# Patient Record
Sex: FEMALE | Race: WHITE | Employment: FULL TIME | URBAN - METROPOLITAN AREA
[De-identification: names, ages, dates, MRNs, and addresses within clinical notes are randomized per-mention and may not be internally consistent; named-entity substitution may affect disease eponyms.]

---

## 2017-01-06 DIAGNOSIS — F51.01 PRIMARY INSOMNIA: Primary | ICD-10-CM

## 2017-01-06 DIAGNOSIS — L70.8 OTHER ACNE: ICD-10-CM

## 2017-01-06 DIAGNOSIS — F32.1 MAJOR DEPRESSIVE DISORDER, SINGLE EPISODE, MODERATE (H): ICD-10-CM

## 2017-01-06 DIAGNOSIS — K27.9 PEPTIC ULCER: ICD-10-CM

## 2017-01-06 RX ORDER — TRAZODONE HYDROCHLORIDE 100 MG/1
100-200 TABLET ORAL AT BEDTIME
Qty: 60 TABLET | Refills: 0 | Status: SHIPPED | OUTPATIENT
Start: 2017-01-06 | End: 2017-02-06

## 2017-01-06 RX ORDER — PAROXETINE 40 MG/1
40 TABLET, FILM COATED ORAL DAILY
Qty: 30 TABLET | Refills: 0 | Status: SHIPPED | OUTPATIENT
Start: 2017-01-06 | End: 2017-02-21

## 2017-01-06 NOTE — TELEPHONE ENCOUNTER
Trazodone Hcl 100 mg tablet       Last Written Prescription Date: 12/06/16  Last Fill Quantity: 60; # refills: 0  Last Office Visit with [facility] [place] [place], [facility] or [facility] prescribing provider:  05/09/16        Last PHQ-9 score on record=   PHQ-9 SCORE 5/9/2016   Total Score -   Total Score 3       AST       13   5/9/2016  ALT       22   5/9/2016    Paroxetine Hcl 40 mg tablet  Last Written Prescription Date: 12/06/16  Last Fill Quantity: 30, # refills: 0  Last Office Visit with [facility] [place] [place] primary care provider:  05/09/16        Last PHQ-9 score on record=   PHQ-9 SCORE 5/9/2016   Total Score -   Total Score 3       Funmi Ghosh CPhT  On Behalf of [facility]

## 2017-01-31 ENCOUNTER — TELEPHONE (OUTPATIENT)
Dept: FAMILY MEDICINE | Facility: CLINIC | Age: 52
End: 2017-01-31

## 2017-01-31 DIAGNOSIS — I10 HYPERTENSION, BENIGN ESSENTIAL, GOAL BELOW 140/90: ICD-10-CM

## 2017-01-31 DIAGNOSIS — E78.5 HYPERLIPIDEMIA LDL GOAL <130: ICD-10-CM

## 2017-01-31 DIAGNOSIS — F33.1 MAJOR DEPRESSIVE DISORDER, RECURRENT EPISODE, MODERATE (H): Primary | ICD-10-CM

## 2017-01-31 NOTE — Clinical Note
[facility]  [address] [address] [phone]      January 31, 2017      Charlene Morales  [address]  [place] [address]        Dear Charlene,     As part of [facility] commitment to health and wellness we have recently reviewed your chart and your medical record indicates that you are due for one or more of the following:    -- Blood pressure check. We changed your Lisinopril dose in September 2016. We want to be sure your medication is working adequately for you. Please call to schedule a nurse visit to have your blood pressure checked or if it is more convenient for you, the pharmacy takes walk in blood pressure checks. Either of these choices are at no charge to you. We will need an updated blood pressure reading to continue to fill your medications.    -- Questionnaire for depression. Please fill out the enclosed questionnaire and send it back to us in the stamped envelope provided. These questions are about your depression and how you have been feeling in the last 2 weeks. We need this form updated in your chart in order to continue to fill your medication. The score of this questionnaire helps to determine if your medications are working well. Thank you in advance for filling it out. Also, enclosed is a Depression Action Plan. Please read through this and keep it for future reference.     -- Lab tests: You are due for the following: Lipid (cholesterol) and Microalbumin (urine test to check for protein). Please schedule a lab appointment. You will need to be fasting for this appointment, nothing to eat or drink except water and your medications.     -- Mammogram. Please call one of the following numbers to schedule:  [facility] [phone]  [facility] [phone]  [facility] [phone]  [facility] [phone]   of [facility] [phone]  [facility] [phone]    -- Colonoscopy or FIT test. You will be due for  this after your 52th birthday. Please call one of the following numbers to schedule a colonoscopy:  [facility] [phone]  [facility] [phone]  [facility] [phone]  [facility] [phone] (multiple sites, call for locations)    A colonoscopy is the gold standard but if you are reluctant to do this there is a less invasive and less expensive alternative. FIT (fecal immunochemical testing) is a screening option that is performed on a yearly basis. This is a test to check for blood in the stool, which can be performed in the comfort of your own home. If you are willing to perform this test, we can order the kit for you to  at our lab. When you have completed the test, you simply mail it in the pre-addressed envelope.    Please call us at [phone] if you need an order for a colonoscopy or FIT testing.    Please try to schedule and/or complete the tests above within the next 2-4 weeks.   The number to call to schedule an appointment at [facility] is [phone].    While we work hard to maintain accurate records on all our patients, it is always possible that this notice does not accurately reflect tests that you may have had. If we have made this error in that case please accept our apologies. To ensure that we do not continue to send you notices please verify, at your next visit or by a [email or web address] message, that we have accurate dates of your tests, even if these were done many years ago.     Working together for your health is our goal. If you have any questions or concerns regarding this letter, we'd like to hear from you.    Thank you for choosing [facility] for your healthcare needs.      Sincerely,     GEOVANY Oh / trish

## 2017-01-31 NOTE — TELEPHONE ENCOUNTER
Panel Management Review  I sent a NatSent message to her with HM that was due but she has not read it. Today I will send her a letter instead.    Patient has the following on her problem list:     Depression / Dysthymia review  PHQ-9 SCORE 11/18/2014 9/30/2015 5/9/2016   Total Score 2 - -   Total Score - 2 3      Patient is due for:  PHQ9 and DAP      IVD   ASA: Failed    Last LDL:    CHOL      179   3/5/2014  HDL       56   3/5/2014  LDL      104   3/5/2014  LDL      174   3/22/2013  TRIG       94   3/5/2014   CHOLHDLRATIO      3.0   3/5/2014     Is the patient on a Statin? YES   Is the patient on Aspirin? NO                  Medications     HMG CoA Reductase Inhibitors    atorvastatin (LIPITOR) 10 MG tablet          Last three blood pressure readings:  BP Readings from Last 3 Encounters:   09/13/16 146/96   05/09/16 140/96   09/30/15 126/74        Tobacco History:     History   Smoking status     Never Smoker    Smokeless tobacco     Never Used         Hypertension   Last three blood pressure readings:  BP Readings from Last 3 Encounters:   09/13/16 146/96   05/09/16 140/96   09/30/15 126/74     Blood pressure: Failed    HTN Guidelines:  Age 18-59 BP range:  Less than 140/90  Age 60-85 with Diabetes:  Less than 140/90  Age 60-85 without Diabetes:  less than 150/90      Composite cancer screening  Chart review shows that this patient is due/due soon for the following Mammogram  Summary:    Patient is due/failing the following:   Blood pressure, mammo, microalbumin, PHQ9, DAP, lipid, colonoscopy/FIT (due after her 52th birthday on 2/10/2017)    Action needed:   Referral for mammo and colon/FIT, lab appt for microalbumin and lipid (future orders placed), patient needs to fill out PHQ9, print and mail DAP, nurse only for BP check    Type of outreach:    Sent letter.    Questions for provider review:    None                                                                                                                                     Birdie Cohen A     Chart routed to none.

## 2017-01-31 NOTE — Clinical Note
My Depression Action Plan  Name: Charlene Morales   Date of Birth 1965  Date: 1/31/2017    My doctor: Tania Vázquez   My clinic: [address] [address] [phone]          GREEN    ZONE   Good Control    What it looks like:     Things are going generally well. You have normal up s and down s. You may even feel depressed from time to time, but bad moods usually last less than a day.   What you need to do:  1. Continue to care for yourself (see self care plan)  2. Check your depression survival kit and update it as needed  3. Follow your physician s recommendations including any medication.  4. Do not stop taking medication unless you consult with your physician first.           YELLOW         ZONE Getting Worse    What it looks like:     Depression is starting to interfere with your life.     It may be hard to get out of bed; you may be starting to isolate yourself from others.    Symptoms of depression are starting to last most all day and this has happened for several days.     You may have suicidal thoughts but they are not constant.   What you need to do:     1. Call your care team, your response to treatment will improve if you keep your care team informed of your progress. Yellow periods are signs an adjustment may need to be made.     2. Continue your self-care, even if you have to fake it!    3. Talk to someone in your support network    4. Open up your depression survival kit           RED    ZONE Medical Alert - Get Help    What it looks like:     Depression is seriously interfering with your life.     You may experience these or other symptoms: You can t get out of bed most days, can t work or engage in other necessary activities, you have trouble taking care of basic hygiene, or basic responsibilities, thoughts of suicide or death that will not go away, self-injurious behavior.     What you need to do:  1. Call your care  team and request a same-day appointment. If they are not available (weekends or after hours) call your local crisis line, emergency room or 911.          Depression Self Care Plan / Survival Kit    Self-Care for Depression  Here s the deal. Your body and mind are really not as separate as most people think.  What you do and think affects how you feel and how you feel influences what you do and think. This means if you do things that people who feel good do, it will help you feel better.  Sometimes this is all it takes.  There is also a place for medication and therapy depending on how severe your depression is, so be sure to consult with your medical provider and/ or Behavioral Health Consultant if your symptoms are worsening or not improving.     In order to better manage my stress, I will:    Exercise  Get some form of exercise, every day. This will help reduce pain and release endorphins, the  feel good  chemicals in your brain. This is almost as good as taking antidepressants!  This is not the same as joining a gym and then never going! (they count on that by the way ) It can be as simple as just going for a walk or doing some gardening, anything that will get you moving.      Hygiene   Maintain good hygiene (Get out of bed in the morning, Make your bed, Brush your teeth, Take a shower, and Get dressed like you were going to work, even if you are unemployed).  If your clothes don't fit try to get ones that do.    Diet  I will strive to eat foods that are good for me, drink plenty of water, and avoid excessive sugar, caffeine, alcohol, and other mood-altering substances.  Some foods that are helpful in depression are: complex carbohydrates, B vitamins, flaxseed, fish or fish oil, fresh fruits and vegetables.    Psychotherapy  I agree to participate in Individual Therapy (if recommended).    Medication  If prescribed medications, I agree to take them.  Missing doses can result in serious side effects.  I  understand that drinking alcohol, or other illicit drug use, may cause potential side effects.  I will not stop my medication abruptly without first discussing it with my provider.    Staying Connected With Others  I will stay in touch with my friends, family members, and my primary care provider/team.    Use your imagination  Be creative.  We all have a creative side; it doesn t matter if it s oil painting, sand castles, or mud pies! This will also kick up the endorphins.    Witness Beauty  (AKA stop and smell the roses) Take a look outside, even in mid-winter. Notice colors, textures. Watch the squirrels and birds.     Service to others  Be of service to others.  There is always someone else in need.  By helping others we can  get out of ourselves  and remember the really important things.  This also provides opportunities for practicing all the other parts of the program.    Humor  Laugh and be silly!  Adjust your TV habits for less news and crime-drama and more comedy.    Control your stress  Try breathing deep, massage therapy, biofeedback, and meditation. Find time to relax each day.     My support system    Clinic Contact:  Phone number:    Contact 1:  Phone number:    Contact 2:  Phone number:    [employer]/:  Phone number:    Therapist:  Phone number:    Local crisis center:    Phone number:    Other community support:  Phone number:

## 2017-02-06 DIAGNOSIS — F51.01 PRIMARY INSOMNIA: ICD-10-CM

## 2017-02-06 DIAGNOSIS — E78.5 HYPERLIPIDEMIA LDL GOAL <130: Primary | ICD-10-CM

## 2017-02-07 NOTE — TELEPHONE ENCOUNTER
Trazodone 100mg      Last Written Prescription Date: 1/6/17  Last Fill Quantity: 60,  # refills: 0   Last Office Visit with [facility], [facility] or [facility] prescribing provider: 5/9/16                                         Next 5 appointments (look out 90 days)     Feb 20, 2017  8:45 AM   Nurse [facility] with [facility] C[facility]Lpn   [facility]    [address] [address]   [phone]                      Lipitor 10mg     Last Written Prescription Date: 9/30/15  Last Fill Quantity: 9, # refills: 1  Last Office Visit with [facility], [facility] or [facility] prescribing provider: 5/9/16   Next 5 appointments (look out 90 days)     Feb 20, 2017  8:45 AM   Nurse Only with Fl Ll Cma/Lpn   [facility]    [address] [address]   [phone]                   CHOL      179   3/5/2014  HDL       56   3/5/2014  LDL      104   3/5/2014  LDL      174   3/22/2013  TRIG       94   3/5/2014  CHOLHDLRATIO      3.0   3/5/2014        Thanks,  Nancy Connelly, Technician (float)  [facility]

## 2017-02-09 RX ORDER — ATORVASTATIN CALCIUM 10 MG/1
10 TABLET, FILM COATED ORAL DAILY
Qty: 30 TABLET | Refills: 0 | Status: SHIPPED | OUTPATIENT
Start: 2017-02-09 | End: 2017-03-10

## 2017-02-09 RX ORDER — TRAZODONE HYDROCHLORIDE 100 MG/1
100-200 TABLET ORAL AT BEDTIME
Qty: 60 TABLET | Refills: 0 | Status: SHIPPED | OUTPATIENT
Start: 2017-02-09 | End: 2017-03-10

## 2017-02-09 NOTE — TELEPHONE ENCOUNTER
Medication is being filled for 1 time refill only due to:   Over due for office visit and/or labs   LAUREN Holliday  RN/Marc Briscoe

## 2017-02-21 DIAGNOSIS — K27.9 PEPTIC ULCER: ICD-10-CM

## 2017-02-21 DIAGNOSIS — F32.1 MAJOR DEPRESSIVE DISORDER, SINGLE EPISODE, MODERATE (H): ICD-10-CM

## 2017-02-21 RX ORDER — PAROXETINE 40 MG/1
40 TABLET, FILM COATED ORAL DAILY
Qty: 30 TABLET | Refills: 0 | Status: SHIPPED | OUTPATIENT
Start: 2017-02-21 | End: 2017-03-10

## 2017-02-21 NOTE — TELEPHONE ENCOUNTER
Paxil    Last Written Prescription Date: 1-6-17  Last Fill Quantity: 30 # refills: 0  Last Office Visit with [facility] primary care provider:  5-9-16   Next 5 appointments (look out 90 days)     Mar 13, 2017  9:00 AM CDT   [facility] C[facility]    [address] [address]   [phone]                   Last PHQ-9 score on record=   PHQ-9 SCORE 5/9/2016   Total Score -   Total Score 3       Omeprazole     Last Written Prescription Date: 8-22-16  Last Fill Quantity: 180  # refills: 1  Last Office Visit with [facility] prescribing provider: 05/09/ 2016                                   Next 5 appointments (look out 90 days)     Mar 13, 2017  9:00 AM CDT   SHORT [facility] NADINE Cooper CNP   [facility]    [address] [address] [phone]

## 2017-03-10 ENCOUNTER — OFFICE VISIT (OUTPATIENT)
Dept: FAMILY MEDICINE | Facility: CLINIC | Age: 52
End: 2017-03-10
Payer: COMMERCIAL

## 2017-03-10 VITALS
TEMPERATURE: 97.9 F | WEIGHT: 183.4 LBS | HEIGHT: 66 IN | DIASTOLIC BLOOD PRESSURE: 102 MMHG | BODY MASS INDEX: 29.47 KG/M2 | SYSTOLIC BLOOD PRESSURE: 152 MMHG | HEART RATE: 72 BPM

## 2017-03-10 DIAGNOSIS — F32.1 MAJOR DEPRESSIVE DISORDER, SINGLE EPISODE, MODERATE (H): Primary | ICD-10-CM

## 2017-03-10 DIAGNOSIS — E78.5 HYPERLIPIDEMIA LDL GOAL <130: ICD-10-CM

## 2017-03-10 DIAGNOSIS — Z12.31 VISIT FOR SCREENING MAMMOGRAM: ICD-10-CM

## 2017-03-10 DIAGNOSIS — Z12.11 SPECIAL SCREENING FOR MALIGNANT NEOPLASMS, COLON: ICD-10-CM

## 2017-03-10 DIAGNOSIS — F51.01 PRIMARY INSOMNIA: ICD-10-CM

## 2017-03-10 DIAGNOSIS — I10 HYPERTENSION, BENIGN ESSENTIAL, GOAL BELOW 140/90: ICD-10-CM

## 2017-03-10 LAB
ANION GAP SERPL CALCULATED.3IONS-SCNC: 9 MMOL/L (ref 3–14)
BUN SERPL-MCNC: 27 MG/DL (ref 7–30)
CALCIUM SERPL-MCNC: 9 MG/DL (ref 8.5–10.1)
CHLORIDE SERPL-SCNC: 110 MMOL/L (ref 94–109)
CHOLEST SERPL-MCNC: 265 MG/DL
CO2 SERPL-SCNC: 27 MMOL/L (ref 20–32)
CREAT SERPL-MCNC: 0.92 MG/DL (ref 0.52–1.04)
CREAT UR-MCNC: 101 MG/DL
GFR SERPL CREATININE-BSD FRML MDRD: 65 ML/MIN/1.7M2
GLUCOSE SERPL-MCNC: 93 MG/DL (ref 70–99)
HDLC SERPL-MCNC: 72 MG/DL
LDLC SERPL CALC-MCNC: 171 MG/DL
MICROALBUMIN UR-MCNC: 11 MG/L
MICROALBUMIN/CREAT UR: 10.99 MG/G CR (ref 0–25)
NONHDLC SERPL-MCNC: 193 MG/DL
POTASSIUM SERPL-SCNC: 3.8 MMOL/L (ref 3.4–5.3)
SODIUM SERPL-SCNC: 146 MMOL/L (ref 133–144)
TRIGL SERPL-MCNC: 111 MG/DL

## 2017-03-10 PROCEDURE — 99213 OFFICE O/P EST LOW 20 MIN: CPT | Performed by: NURSE PRACTITIONER

## 2017-03-10 PROCEDURE — 36415 COLL VENOUS BLD VENIPUNCTURE: CPT | Performed by: NURSE PRACTITIONER

## 2017-03-10 PROCEDURE — 80048 BASIC METABOLIC PNL TOTAL CA: CPT | Performed by: NURSE PRACTITIONER

## 2017-03-10 PROCEDURE — 82043 UR ALBUMIN QUANTITATIVE: CPT | Performed by: NURSE PRACTITIONER

## 2017-03-10 PROCEDURE — 80061 LIPID PANEL: CPT | Performed by: NURSE PRACTITIONER

## 2017-03-10 RX ORDER — LISINOPRIL 40 MG/1
40 TABLET ORAL DAILY
Qty: 90 TABLET | Refills: 1 | Status: CANCELLED | OUTPATIENT
Start: 2017-03-10

## 2017-03-10 RX ORDER — TRAZODONE HYDROCHLORIDE 100 MG/1
100-200 TABLET ORAL AT BEDTIME
Qty: 60 TABLET | Refills: 11 | Status: SHIPPED | OUTPATIENT
Start: 2017-03-10 | End: 2018-03-23

## 2017-03-10 RX ORDER — LISINOPRIL 40 MG/1
40 TABLET ORAL DAILY
Qty: 90 TABLET | Refills: 3 | Status: SHIPPED | OUTPATIENT
Start: 2017-03-10 | End: 2018-04-10

## 2017-03-10 RX ORDER — CHLORTHALIDONE 25 MG/1
12.5 TABLET ORAL DAILY
Qty: 45 TABLET | Refills: 3 | Status: SHIPPED | OUTPATIENT
Start: 2017-03-10 | End: 2018-01-02

## 2017-03-10 RX ORDER — PAROXETINE 40 MG/1
40 TABLET, FILM COATED ORAL DAILY
Qty: 90 TABLET | Refills: 1 | Status: SHIPPED | OUTPATIENT
Start: 2017-03-10 | End: 2017-08-30

## 2017-03-10 RX ORDER — ATORVASTATIN CALCIUM 10 MG/1
10 TABLET, FILM COATED ORAL DAILY
Qty: 90 TABLET | Refills: 3 | Status: SHIPPED | OUTPATIENT
Start: 2017-03-10 | End: 2017-03-14 | Stop reason: DRUGHIGH

## 2017-03-10 RX ORDER — LISINOPRIL AND HYDROCHLOROTHIAZIDE 12.5; 2 MG/1; MG/1
2 TABLET ORAL DAILY
Qty: 180 TABLET | Refills: 1 | Status: SHIPPED | OUTPATIENT
Start: 2017-03-10 | End: 2017-03-10

## 2017-03-10 NOTE — MR AVS SNAPSHOT
After Visit Summary   3/10/2017    Charlene Morales    MRN: [id number]           Patient Information     Date Of Birth          1965        Visit Information        Provider Department      3/10/2017 8:00 AM Tania Vázquez APRN CNP [facility]        Today's Diagnoses     Major depressive disorder, single episode, moderate (H)    -  1    Hyperlipidemia LDL goal <130        Primary insomnia        Hypertension, benign essential, goal below 140/90        Special screening for malignant neoplasms, colon        Visit for screening mammogram          Care Instructions    You are doing great with the exercise     Stay well hydrated - urine should be clear.        Will add in another blood pressure pill.   Recheck your blood pressure in  2-3 weeks.  You can make a nurse only visit or go to the pharmacy      Follow DASH diet,             Follow-ups after your visit        Additional Services     GASTROENTEROLOGY ADULT REF CONSULT ONLY       Preferred Location: [facility] [phone]      Please be aware that coverage of these services is subject to the terms and limitations of your health insurance plan.  Call member services at your health plan with any benefit or coverage questions.  Any procedures must be performed at a [facility] facility OR coordinated by your clinic's referral office.    Please bring the following with you to your appointment:    (1) Any X-Rays, CTs or MRIs which have been performed.  Contact the facility where they were done to arrange for  prior to your scheduled appointment.    (2) List of current medications   (3) This referral request   (4) Any documents/labs given to you for this referral                  Future tests that were ordered for you today     Open Future Orders        Priority Expected Expires Ordered    *MA Screening Digital Bilateral Routine  3/10/2018 3/10/2017            Who to contact     Normal or non-critical lab and  "imaging results will be communicated to you by MyChart, letter or phone within 4 business days after the clinic has received the results. If you do not hear from us within 7 days, please contact the clinic through [employer] or phone. If you have a critical or abnormal lab result, we will notify you by phone as soon as possible.  Submit refill requests through Now[employer] or call your pharmacy and they will forward the refill request to us. Please allow 3 business days for your refill to be completed.          If you need to speak with a  for additional information , please call: [phone]           Additional Information About Your Visit        NowThis [employer] Information     [employer] gives you secure access to your electronic health record. If you see a primary care provider, you can also send messages to your care team and make appointments. If you have questions, please call your primary care clinic.  If you do not have a primary care provider, please call [phone] and they will assist you.        Care EveryWhere ID     This is your Care EveryWhere ID. This could be used by other organizations to access your [facility] medical records  [id number]        Your Vitals Were     Pulse Temperature Height BMI (Body Mass Index)          72 97.9  F (36.6  C) (Tympanic) 5' 5.5\" (1.664 m) 30.06 kg/m2         Blood Pressure from Last 3 Encounters:   03/10/17 (!) 152/102   09/13/16 (!) 146/96   05/09/16 (!) 140/96    Weight from Last 3 Encounters:   03/10/17 183 lb 6.4 oz (83.2 kg)   05/09/16 189 lb 12.8 oz (86.1 kg)   09/30/15 188 lb 3.2 oz (85.4 kg)              We Performed the Following     Albumin Random Urine Quantitative     Basic metabolic panel  (Ca, Cl, CO2, Creat, Gluc, K, Na, BUN)     GASTROENTEROLOGY ADULT REF CONSULT ONLY     Lipid Profile with reflex to direct LDL          Today's Medication Changes          These changes are accurate as of: 3/10/17  8:39 AM.  If you have any questions, ask your nurse " or doctor.               These medicines have changed or have updated prescriptions.        Dose/Directions    * lisinopril 40 MG tablet   Commonly known as:  PRINIVIL/ZESTRIL   This may have changed:  Another medication with the same name was changed. Make sure you understand how and when to take each.   Used for:  Hypertension, benign essential, goal below 140/90   Changed by:  Tania Vázquez APRN CNP        Dose:  40 mg   Take 1 tablet (40 mg) by mouth daily   Quantity:  90 tablet   Refills:  1       * lisinopril 40 MG tablet   Commonly known as:  PRINIVIL/ZESTRIL   This may have changed:    - medication strength  - how much to take  - additional instructions   Used for:  Hypertension, benign essential, goal below 140/90   Changed by:  Tania Vázquez APRN CNP        Dose:  40 mg   Take 1 tablet (40 mg) by mouth daily   Quantity:  90 tablet   Refills:  3       PARoxetine 40 MG tablet   Commonly known as:  PAXIL   This may have changed:  additional instructions   Used for:  Major depressive disorder, single episode, moderate (H)   Changed by:  Tania Vázquez APRN CNP        Dose:  40 mg   Take 1 tablet (40 mg) by mouth daily   Quantity:  90 tablet   Refills:  1       traZODone 100 MG tablet   Commonly known as:  DESYREL   This may have changed:  additional instructions   Used for:  Primary insomnia   Changed by:  Tania Vázquez APRN CNP        Dose:  100-200 mg   Take 1-2 tablets (100-200 mg) by mouth At Bedtime   Quantity:  60 tablet   Refills:  11       * Notice:  This list has 2 medication(s) that are the same as other medications prescribed for you. Read the directions carefully, and ask your doctor or other care provider to review them with you.         Where to get your medicines      These medications were sent to [facility] [address]  [address], [place] [address]     Phone:  [phone]     atorvastatin 10 MG  tablet    lisinopril 40 MG tablet    PARoxetine 40 MG tablet    traZODone 100 MG tablet                Primary Care Provider Office Phone # Fax #    NADINE Cooper [facility] [phone]       [facility] [address] [place] [address]        Thank you!     Thank you for choosing [facility]  for your care. Our goal is always to provide you with excellent care. Hearing back from our patients is one way we can continue to improve our services. Please take a few minutes to complete the written survey that you may receive in the mail after your visit with us. Thank you!             Your Updated Medication List - Protect others around you: Learn how to safely use, store and throw away your medicines at www.disposemymeds.org.          This list is accurate as of: 3/10/17  8:39 AM.  Always use your most recent med list.                   Brand Name Dispense Instructions for use    ACETAMINOPHEN      Reported on 3/10/2017       atorvastatin 10 MG tablet    LIPITOR    90 tablet    Take 1 tablet (10 mg) by mouth daily Last fill need office appt and fasting labs done for any further refills       ibuprofen 200 MG tablet    ADVIL/MOTRIN    120    5 orally at onset of headache       * lisinopril 40 MG tablet    PRINIVIL/ZESTRIL    90 tablet    Take 1 tablet (40 mg) by mouth daily       * lisinopril 40 MG tablet    PRINIVIL/ZESTRIL    90 tablet    Take 1 tablet (40 mg) by mouth daily       minocycline 100 MG tablet    DYNACIN    180 tablet    Take one to two capsules po qd for acne       omeprazole 20 MG CR capsule    priLOSEC    180 capsule    Take 2 capsules (40 mg) by mouth daily       PARoxetine 40 MG tablet    PAXIL    90 tablet    Take 1 tablet (40 mg) by mouth daily       sucralfate 1 GM tablet    CARAFATE    120 tablet    Take 1 tablet (1 g) by mouth 4 times daily Last refill until seen and labs       traZODone 100 MG tablet    DESYREL    60 tablet    Take 1-2  tablets (100-200 mg) by mouth At Bedtime       valACYclovir 1000 mg tablet    VALTREX    21 tablet    Take 1 tablet (1,000 mg) by mouth 3 times daily for 7 days       * Notice:  This list has 2 medication(s) that are the same as other medications prescribed for you. Read the directions carefully, and ask your doctor or other care provider to review them with you.

## 2017-03-10 NOTE — PROGRESS NOTES
SUBJECTIVE:                                                    Charlene Morales is a 52 year old female who presents to clinic today for the following health issues:      Hyperlipidemia Follow-Up      Rate your low fat/cholesterol diet?: good    Taking statin?  Yes, no muscle aches from statin    Other lipid medications/supplements?:  none     Hypertension Follow-up      Outpatient blood pressures are not being checked.    Low Salt Diet: not monitoring salt     Depression Followup    Status since last visit: Stable     See PHQ-9 for current symptoms.  Other associated symptoms: None    Complicating factors:   Significant life event:  No   Current substance abuse:  None  Anxiety or Panic symptoms:  No    PHQ-9  English PHQ-9   Any Language        Amount of exercise or physical activity: 4-5 days/week for an average of 15-30 minutes    Problems taking medications regularly: No    Medication side effects: none  Diet: regular (no restrictions)    Has lost 10 #  Is exercising  5 days per week  30 min at at time   Diet is good.     Problem list and histories reviewed & adjusted, as indicated.  Additional history: as documented    Patient Active Problem List   Diagnosis     Hypertension, benign essential, goal below 140/90     Peptic ulcer     Headache     BARBITUATE  DEPENDENCE      IRRITABLE BLADDER     CYSTIC ACNE     Weight gain     Health Care Home     [facility]     Hyperlipidemia LDL goal <130     History of  addiction  in treatment 7 year ago      Major depressive disorder, recurrent episode, moderate (H)     Primary insomnia     Past Surgical History   Procedure Laterality Date     Surgical history of -   8/23/2004     Hemorrhoidectomy & Partial Lateral Sphincterotomy     Surgical history of -   5/2/2003     Hemorrhoid Banding x2     Hc colonoscopy thru stoma, diagnostic  5/2/03     Normal. Repeat at age 52     Hysterectomy, pap no longer indicated  2004     Still has ovaries and        Social History   Substance Use  Topics     Smoking status: Never Smoker     Smokeless tobacco: Never Used     Alcohol use Yes      Comment: 1-2 drinks a week at most- wine      Family History   Problem Relation Age of Onset     Arthritis Mother      Hypertension Mother      HEART DISEASE Father      DIABETES Father      Lipids Father      Alcohol/Drug Father      C.A.D. Father      open heart surgery at age 50     Hypertension Maternal Grandfather      Alzheimer Disease Maternal Grandfather      Colon Cancer Maternal Grandfather      age 65     HEART DISEASE Paternal Grandfather      Thyroid Disease Sister      Hypertension Sister      Hypertension Sister      CEREBROVASCULAR DISEASE No family hx of      Breast Cancer No family hx of          Current Outpatient Prescriptions   Medication Sig Dispense Refill     PARoxetine (PAXIL) 40 MG tablet Take 1 tablet (40 mg) by mouth daily Last refill until seen 30 tablet 0     omeprazole (PRILOSEC) 20 MG CR capsule Take 2 capsules (40 mg) by mouth daily 180 capsule 1     atorvastatin (LIPITOR) 10 MG tablet Take 1 tablet (10 mg) by mouth daily Last fill need office appt and fasting labs done for any further refills 30 tablet 0     traZODone (DESYREL) 100 MG tablet Take 1-2 tablets (100-200 mg) by mouth At Bedtime Last fill  Needs office appt for any further refills March 2017 60 tablet 0     lisinopril (PRINIVIL,ZESTRIL) 40 MG tablet Take 1 tablet (40 mg) by mouth daily 90 tablet 1     minocycline (DYNACIN) 100 MG tablet Take one to two capsules po qd for acne 180 tablet 1     sucralfate (CARAFATE) 1 GM tablet Take 1 tablet (1 g) by mouth 4 times daily Last refill until seen and labs (Patient not taking: Reported on 3/10/2017) 120 tablet o     [DISCONTINUED] lisinopril (PRINIVIL,ZESTRIL) 20 MG tablet Take 1 tablet (20 mg) by mouth daily You are due for blood pressure check 30 tablet 0     valACYclovir (VALTREX) 1000 mg tablet Take 1 tablet (1,000 mg) by mouth 3 times daily for 7 days 21 tablet 0      "ACETAMINOPHEN Reported on 3/10/2017       IBUPROFEN 200 MG OR TABS 5 orally at onset of headache (Patient not taking: No sig reported) 120 prn     Allergies   Allergen Reactions     Sulfa Drugs      BP Readings from Last 3 Encounters:   03/10/17 (!) 152/102   09/13/16 (!) 146/96   05/09/16 (!) 140/96    Wt Readings from Last 3 Encounters:   03/10/17 183 lb 6.4 oz (83.2 kg)   05/09/16 189 lb 12.8 oz (86.1 kg)   09/30/15 188 lb 3.2 oz (85.4 kg)                    Reviewed and updated as needed this visit by clinical staff       Reviewed and updated as needed this visit by Provider         ROS:  C: NEGATIVE for fever, chills, change in weight  E/M: NEGATIVE for ear, mouth and throat problems  R: NEGATIVE for significant cough or SOB  CV: NEGATIVE for chest pain, palpitations or peripheral edema, no chest pain. Is tolerating exercise  Well.   GI: NEGATIVE for nausea, abdominal pain, heartburn, or change in bowel habits  PSYCHIATRIC: NEGATIVE for changes in mood or affect and depression is well controlled     OBJECTIVE:                                                    BP (!) 152/102 (BP Location: Left arm, Patient Position: Chair, Cuff Size: Adult Regular)  Pulse 72  Temp 97.9  F (36.6  C) (Tympanic)  Ht 5' 5.5\" (1.664 m)  Wt 183 lb 6.4 oz (83.2 kg)  BMI 30.06 kg/m2  Body mass index is 30.06 kg/(m^2).  GENERAL: healthy, alert and no distress  HENT: ear canals and TM's normal, nose and mouth without ulcers or lesions  NECK: no adenopathy, no asymmetry, masses, or scars and thyroid normal to palpation  RESP: lungs clear to auscultation - no rales, rhonchi or wheezes  CV: regular rate and rhythm, normal S1 S2, no S3 or S4, no murmur, click or rub, no peripheral edema and peripheral pulses strong  ABDOMEN: soft, nontender, no hepatosplenomegaly, no masses and bowel sounds normal  MS: no gross musculoskeletal defects noted, no edema  PSYCH: mentation appears normal, affect normal/bright    Diagnostic Test Results:  Labs are " "pending      ASSESSMENT/PLAN:                                                      ASSESSMENT/PLAN:      ICD-10-CM    1. Major depressive disorder, single episode, moderate (H) F32.1 PARoxetine (PAXIL) 40 MG tablet   2. Hyperlipidemia LDL goal <130 E78.5 atorvastatin (LIPITOR) 10 MG tablet     Lipid Profile with reflex to direct LDL   3. Primary insomnia F51.01 traZODone (DESYREL) 100 MG tablet   4. Hypertension, benign essential, goal below 140/90 I10 Albumin Random Urine Quantitative     Basic metabolic panel  (Ca, Cl, CO2, Creat, Gluc, K, Na, BUN)     lisinopril (PRINIVIL/ZESTRIL) 40 MG tablet     chlorthalidone (HYGROTON) 25 MG tablet     DISCONTINUED: lisinopril-hydrochlorothiazide (PRINZIDE/ZESTORETIC) 20-12.5 MG per tablet   5. Special screening for malignant neoplasms, colon Z12.11 GASTROENTEROLOGY ADULT REF CONSULT ONLY   6. Visit for screening mammogram Z12.31 *MA Screening Digital Bilateral       Patient Instructions   You are doing great with the exercise     Stay well hydrated - urine should be clear.        Will add in another blood pressure pill.   Recheck your blood pressure in  2-3 weeks.  You can make a nurse only visit or go to the pharmacy      Follow DASH diet,                  BMI:   Estimated body mass index is 30.06 kg/(m^2) as calculated from the following:    Height as of this encounter: 5' 5.5\" (1.664 m).    Weight as of this encounter: 183 lb 6.4 oz (83.2 kg).   Weight management plan: Discussed healthy diet and exercise guidelines and patient will follow up in 6 months in clinic to re-evaluate.      MEDICATIONS:        - Start taking Chlorthalidone        - Continue other medications without change  FUTURE APPOINTMENTS:       - mammo  Colonoscopy   Work on weight loss  Regular exercise  See Patient Instructions    YASMANY ROMAN NP, APRN [facility]    "

## 2017-03-10 NOTE — NURSING NOTE
"Chief Complaint   Patient presents with     Lipids     Hypertension     Diabetes       Initial BP (!) 152/102 (BP Location: Left arm, Patient Position: Chair, Cuff Size: Adult Regular)  Pulse 72  Temp 97.9  F (36.6  C) (Tympanic)  Ht 5' 5.5\" (1.664 m)  Wt 183 lb 6.4 oz (83.2 kg)  BMI 30.06 kg/m2 Estimated body mass index is 30.06 kg/(m^2) as calculated from the following:    Height as of this encounter: 5' 5.5\" (1.664 m).    Weight as of this encounter: 183 lb 6.4 oz (83.2 kg).  Medication Reconciliation: complete     Meghan Mendoza CMA (AAMA)      "

## 2017-03-10 NOTE — PATIENT INSTRUCTIONS
You are doing great with the exercise     Stay well hydrated - urine should be clear.        Will add in another blood pressure pill.   Recheck your blood pressure in  2-3 weeks.  You can make a nurse only visit or go to the pharmacy      Follow DASH diet,

## 2017-03-11 ASSESSMENT — PATIENT HEALTH QUESTIONNAIRE - PHQ9: SUM OF ALL RESPONSES TO PHQ QUESTIONS 1-9: 1

## 2017-03-14 ENCOUNTER — TELEPHONE (OUTPATIENT)
Dept: FAMILY MEDICINE | Facility: CLINIC | Age: 52
End: 2017-03-14

## 2017-03-14 DIAGNOSIS — E78.5 HYPERLIPIDEMIA LDL GOAL <130: Primary | ICD-10-CM

## 2017-03-14 DIAGNOSIS — E87.0 SERUM SODIUM ELEVATED: ICD-10-CM

## 2017-03-14 RX ORDER — ATORVASTATIN CALCIUM 40 MG/1
40 TABLET, FILM COATED ORAL DAILY
Qty: 90 TABLET | Refills: 3 | Status: SHIPPED | OUTPATIENT
Start: 2017-03-14 | End: 2018-04-10

## 2017-03-14 NOTE — TELEPHONE ENCOUNTER
Pt called and wants her lab results please, please release them to [facility].     Kaila Lamar, [place]

## 2017-03-28 DIAGNOSIS — L70.8 OTHER ACNE: ICD-10-CM

## 2017-03-28 NOTE — TELEPHONE ENCOUNTER
Minocycline      Last Written Prescription Date: 03/25/2016  Last Fill Quantity: 180,  # refills: 1   Last Office Visit with G, P or [facility] prescribing provider: 03/10/2017                                             Nora Sun [facility]  Float Technician  Marc Briscoe

## 2017-03-28 NOTE — TELEPHONE ENCOUNTER
Routing refill request to provider for review/approval because:  A break in medication    Una Naylor, PharmD  [facility]  On behalf of [facility]

## 2017-03-30 RX ORDER — MINOCYCLINE HYDROCHLORIDE 100 MG/1
TABLET ORAL
Qty: 180 TABLET | Refills: 1 | Status: SHIPPED | OUTPATIENT
Start: 2017-03-30 | End: 2018-01-02

## 2017-05-12 ENCOUNTER — TELEPHONE (OUTPATIENT)
Dept: FAMILY MEDICINE | Facility: CLINIC | Age: 52
End: 2017-05-12

## 2017-05-12 NOTE — TELEPHONE ENCOUNTER
Panel Management Review      Patient has the following on her problem list:     Depression / Dysthymia review  PHQ-9 SCORE 9/30/2015 5/9/2016 3/10/2017   Total Score - - -   Total Score 2 3 1      Patient is due for:  None      IVD   ASA: FAILED    Last LDL:    Lab Results   Component Value Date    CHOL 265 03/10/2017     Lab Results   Component Value Date    HDL 72 03/10/2017     Lab Results   Component Value Date     03/10/2017     Lab Results   Component Value Date    TRIG 111 03/10/2017        Lab Results   Component Value Date    CHOLHDLRATIO 3.0 03/05/2014        Is the patient on a Statin? YES   Is the patient on Aspirin? NO                  Medications     HMG CoA Reductase Inhibitors    atorvastatin (LIPITOR) 40 MG tablet          Last three blood pressure readings:  BP Readings from Last 3 Encounters:   03/10/17 (!) 152/102   09/13/16 (!) 146/96   05/09/16 (!) 140/96        Tobacco History:     History   Smoking Status     Never Smoker   Smokeless Tobacco     Never Used       Hypertension   Last three blood pressure readings:  BP Readings from Last 3 Encounters:   03/10/17 (!) 152/102   09/13/16 (!) 146/96   05/09/16 (!) 140/96     Blood pressure: FAILED    HTN Guidelines:  Age 18-59 BP range:  Less than 140/90  Age 60-85 with Diabetes:  Less than 140/90  Age 60-85 without Diabetes:  less than 150/90      Composite cancer screening  Chart review shows that this patient is due/due soon for the following Mammogram and Colonoscopy  Summary:    Patient is due/failing the following:   BP CHECK, COLONOSCOPY and MAMMOGRAM    Action needed:   Patient needs office visit for BP check.    Type of outreach:    Sent [employer] message.    Questions for provider review:    None                                                                                                                                    Funmi Stanley       Chart routed to none .

## 2017-05-16 DIAGNOSIS — Z87.11 HISTORY OF GASTRIC ULCER: ICD-10-CM

## 2017-05-16 DIAGNOSIS — R10.11 ABDOMINAL PAIN, RIGHT UPPER QUADRANT: ICD-10-CM

## 2017-05-16 NOTE — TELEPHONE ENCOUNTER
sucralfate      Last Written Prescription Date: 11-14-16  Last Fill Quantity: 120,  # refills: 0   Last Office Visit with G, P or [facility] prescribing provider: 3-10-17

## 2017-05-17 RX ORDER — SUCRALFATE 1 G/1
1 TABLET ORAL 4 TIMES DAILY
Qty: 120 TABLET | Refills: 2 | Status: SHIPPED | OUTPATIENT
Start: 2017-05-17 | End: 2017-11-14

## 2017-05-17 NOTE — TELEPHONE ENCOUNTER
Prescription approved per [facility] Refill Protocol or patient Primary care provider (PCP)  LAUREN Holliday RN/Marc Briscoe

## 2017-06-24 ENCOUNTER — HEALTH MAINTENANCE LETTER (OUTPATIENT)
Age: 52
End: 2017-06-24

## 2017-08-30 DIAGNOSIS — F32.1 MAJOR DEPRESSIVE DISORDER, SINGLE EPISODE, MODERATE (H): ICD-10-CM

## 2017-08-30 NOTE — TELEPHONE ENCOUNTER
PARoxetine (PAXIL) 40 MG tablet     Last Written Prescription Date: 3/10/17  Last Fill Quantity: 90, # refills: 1  Last Office Visit with G primary care provider:  3/10/17        Last PHQ-9 score on record=   PHQ-9 SCORE 3/10/2017   Total Score -   Total Score 1

## 2017-08-31 RX ORDER — PAROXETINE 40 MG/1
TABLET, FILM COATED ORAL
Qty: 90 TABLET | Refills: 2 | Status: SHIPPED | OUTPATIENT
Start: 2017-08-31 | End: 2018-04-10

## 2017-09-12 DIAGNOSIS — K27.9 PEPTIC ULCER: ICD-10-CM

## 2017-09-12 NOTE — TELEPHONE ENCOUNTER
Omeprazole Caps 20mg      Last Written Prescription Date: 02/21/2017 #180 x1  Last filled 06/06/2017  Last Office Visit with [facility], [facility] or [facility] prescribing provider: 03/10/2017 AISHA Vázquez

## 2017-09-13 NOTE — TELEPHONE ENCOUNTER
Prescription approved per [facility] Refill Protocol or patient Primary care provider (PCP)  LAUREN Holliday RN/Marc Briscoe

## 2017-11-14 DIAGNOSIS — R10.11 ABDOMINAL PAIN, RIGHT UPPER QUADRANT: ICD-10-CM

## 2017-11-14 DIAGNOSIS — Z87.11 HISTORY OF GASTRIC ULCER: ICD-10-CM

## 2017-11-16 RX ORDER — SUCRALFATE 1 G/1
TABLET ORAL
Qty: 120 TABLET | Refills: 0 | Status: SHIPPED | OUTPATIENT
Start: 2017-11-16 | End: 2018-04-26

## 2018-01-02 DIAGNOSIS — I10 HYPERTENSION, BENIGN ESSENTIAL, GOAL BELOW 140/90: ICD-10-CM

## 2018-01-02 DIAGNOSIS — L70.8 OTHER ACNE: ICD-10-CM

## 2018-01-02 NOTE — TELEPHONE ENCOUNTER
Chlorthalidone 25mg      Last Written Prescription Date: 03/10/2017 #45 x 3  Last filled 10/16/2017  Last Office Visit with [facility] or  [facility] prescribing provider: 03/10/2017 AISHA Vázquez       Potassium   Date Value Ref Range Status   03/10/2017 3.8 3.4 - 5.3 mmol/L Final     Creatinine   Date Value Ref Range Status   03/10/2017 0.92 0.52 - 1.04 mg/dL Final     BP Readings from Last 3 Encounters:   03/10/17 (!) 152/102   09/13/16 (!) 146/96   05/09/16 (!) 140/96     Minocycline 100mg      Last Written Prescription Date: 03/30/2017 #180 x 1  Last filled 08/31/2017  Last Office Visit with [facility] or [facility] prescribing provider: 03/10/2017 AISHA Vázquez

## 2018-01-03 RX ORDER — MINOCYCLINE HYDROCHLORIDE 100 MG/1
CAPSULE ORAL
Qty: 180 CAPSULE | Refills: 0 | Status: SHIPPED | OUTPATIENT
Start: 2018-01-03 | End: 2018-01-29

## 2018-01-03 RX ORDER — CHLORTHALIDONE 25 MG/1
TABLET ORAL
Qty: 45 TABLET | Refills: 0 | Status: SHIPPED | OUTPATIENT
Start: 2018-01-03 | End: 2018-03-12

## 2018-01-18 ENCOUNTER — ALLIED HEALTH/NURSE VISIT (OUTPATIENT)
Dept: FAMILY MEDICINE | Facility: CLINIC | Age: 53
End: 2018-01-18
Payer: COMMERCIAL

## 2018-01-18 VITALS — DIASTOLIC BLOOD PRESSURE: 72 MMHG | SYSTOLIC BLOOD PRESSURE: 126 MMHG

## 2018-01-18 DIAGNOSIS — I10 HYPERTENSION GOAL BP (BLOOD PRESSURE) < 130/80: Primary | ICD-10-CM

## 2018-01-18 PROCEDURE — 99207 ZZC NO CHARGE NURSE ONLY: CPT | Performed by: NURSE PRACTITIONER

## 2018-01-18 NOTE — MR AVS SNAPSHOT
After Visit Summary   1/18/2018    Charlene Morales    MRN: [id number]           Patient Information     Date Of Birth          1965        Visit Information        Provider Department      1/18/2018 3:55 PM Tania Vázquez APRN [facility]        Today's Diagnoses     Hypertension goal BP (blood pressure) < 130/80    -  1       Follow-ups after your visit        Who to contact     Normal or non-critical lab and imaging results will be communicated to you by [employer], letter or phone within 4 business days after the clinic has received the results. If you do not hear from us within 7 days, please contact the clinic through [employer] or phone. If you have a critical or abnormal lab result, we will notify you by phone as soon as possible.  Submit refill requests through [employer] or call your pharmacy and they will forward the refill request to us. Please allow 3 business days for your refill to be completed.          If you need to speak with a  for additional information , please call: [phone]           Additional Information About Your Visit        [employer] Information     [employer] gives you secure access to your electronic health record. If you see a primary care provider, you can also send messages to your care team and make appointments. If you have questions, please call your primary care clinic.  If you do not have a primary care provider, please call [phone] and they will assist you.        Care EveryWhere ID     This is your Care EveryWhere ID. This could be used by other organizations to access your [facility] medical records  [id number]         Blood Pressure from Last 3 Encounters:   01/18/18 126/72   03/10/17 (!) 152/102   09/13/16 (!) 146/96    Weight from Last 3 Encounters:   03/10/17 183 lb 6.4 oz (83.2 kg)   05/09/16 189 lb 12.8 oz (86.1 kg)   09/30/15 188 lb 3.2 oz (85.4 kg)              Today, you had the following     No orders  found for display       Primary Care Provider Office Phone # Fax #    Tania Marc Twan Gurpreet, APRN [facility] [phone] [address] [place] [address]        Equal Access to Services     LYSSA MARTINEZ : Hadii delaney ku salinao Sojosieali, waaxda luqadaha, qaybta kaalmada adeegyada, waxlilia idiin louisen christine nguyen lablane yuen. So [facility] [phone].    ATENCIÓN: Si habla español, tiene a talley disposición servicios gratuitos de asistencia lingüística. Llame al [phone].    We comply with applicable federal civil rights laws and [place] laws. We do not discriminate on the basis of race, color, national origin, age, disability, sex, sexual orientation, or gender identity.            Thank you!     Thank you for choosing [facility]  for your care. Our goal is always to provide you with excellent care. Hearing back from our patients is one way we can continue to improve our services. Please take a few minutes to complete the written survey that you may receive in the mail after your visit with us. Thank you!             Your Updated Medication List - Protect others around you: Learn how to safely use, store and throw away your medicines at www.disposemymeds.org.          This list is accurate as of: 1/18/18  3:57 PM.  Always use your most recent med list.                   Brand Name Dispense Instructions for use Diagnosis    ACETAMINOPHEN      Reported on 3/10/2017        atorvastatin 40 MG tablet    LIPITOR    90 tablet    Take 1 tablet (40 mg) by mouth daily    Hyperlipidemia LDL goal <130       chlorthalidone 25 MG tablet    HYGROTON    45 tablet    TAKE ONE-HALF TABLET BY MOUTH EVERY DAY    Hypertension, benign essential, goal below 140/90       ibuprofen 200 MG tablet    ADVIL/MOTRIN    120    5 orally at onset of headache    Headache(784.0)       lisinopril 40 MG tablet    PRINIVIL/ZESTRIL    90 tablet    Take 1 tablet (40 mg) by mouth daily    Hypertension, benign essential, goal  below 140/90       minocycline 100 MG capsule    MINOCIN/DYNACIN    180 capsule    TAKE ONE TO TWO CAPSULES BY MOUTH EVERY DAY FOR ACNE    Other acne       omeprazole 20 MG CR capsule    priLOSEC    180 capsule    TAKE TWO CAPSULES (40MG)  BY MOUTH EVERY DAY    Peptic ulcer       PARoxetine 40 MG tablet    PAXIL    90 tablet    TAKE ONE TABLET BY MOUTH EVERY DAY    Major depressive disorder, single episode, moderate (H)       sucralfate 1 GM tablet    CARAFATE    120 tablet    TAKE ONE TABLET BY MOUTH FOUR TIMES A DAY    Abdominal pain, right upper quadrant, History of gastric ulcer       traZODone 100 MG tablet    DESYREL    60 tablet    Take 1-2 tablets (100-200 mg) by mouth At Bedtime    Primary insomnia       valACYclovir 1000 mg tablet    VALTREX    21 tablet    Take 1 tablet (1,000 mg) by mouth 3 times daily for 7 days

## 2018-01-18 NOTE — PROGRESS NOTES
Charlene Morales is enrolled/participating in the retail pharmacy Blood Pressure Goals Achievement Program (BPGAP).  Charlene Morales was evaluated at [facility] on January 18, 2018 at which time her blood pressure was:    BP Readings from Last 3 Encounters:   01/18/18 126/72   03/10/17 (!) 152/102   09/13/16 (!) 146/96     Reviewed lifestyle modifications for blood pressure control and reduction: including making healthy food choices, managing weight, getting regular exercise, smoking cessation, reducing alcohol consumption, monitoring blood pressure regularly.     Charlene Morales is not experiencing symptoms.    Follow-Up: BP is at goal of < 140/90mmHg (patient 18+ years of age with or without diabetes).  Recommended follow-up at pharmacy in 6 months.     Recommendation to Provider: -    Charlene Morales was evaluated for enrollment into the PGEN study today.    Patient eligible for enrollment:  Unknown  Patient interested in enrollment:  Unknown    Completed by: Maureen Berry RPh  [facility]  [phone]

## 2018-01-22 ENCOUNTER — MYC MEDICAL ADVICE (OUTPATIENT)
Dept: FAMILY MEDICINE | Facility: CLINIC | Age: 53
End: 2018-01-22

## 2018-01-23 ENCOUNTER — NURSE TRIAGE (OUTPATIENT)
Dept: NURSING | Facility: CLINIC | Age: 53
End: 2018-01-23

## 2018-01-23 NOTE — TELEPHONE ENCOUNTER
Yeast infection under breast which is now spreading under armpit and at hip line.  Very itchy, worsening since starting OTC yeast treatment.    Reason for Disposition    [1] Rash looks infected (e.g., spreading redness, pus) AND [2] no fever    Protocols used: ATHLETE'S FOOT-ADULT-

## 2018-01-23 NOTE — TELEPHONE ENCOUNTER
With the spread of the rash it would be best to be seen   Eliminate sugar as much as possible.   Drink a bottle of water with an ounce of apple cider vinegar in it to change your bodies pH.

## 2018-01-29 ENCOUNTER — OFFICE VISIT (OUTPATIENT)
Dept: FAMILY MEDICINE | Facility: CLINIC | Age: 53
End: 2018-01-29
Payer: COMMERCIAL

## 2018-01-29 VITALS
SYSTOLIC BLOOD PRESSURE: 124 MMHG | WEIGHT: 191 LBS | HEIGHT: 65 IN | BODY MASS INDEX: 31.82 KG/M2 | DIASTOLIC BLOOD PRESSURE: 80 MMHG | HEART RATE: 96 BPM | TEMPERATURE: 97.5 F

## 2018-01-29 DIAGNOSIS — L50.9 URTICARIA: Primary | ICD-10-CM

## 2018-01-29 DIAGNOSIS — L23.9 ALLERGIC DERMATITIS: ICD-10-CM

## 2018-01-29 PROCEDURE — 99213 OFFICE O/P EST LOW 20 MIN: CPT | Performed by: FAMILY MEDICINE

## 2018-01-29 RX ORDER — PREDNISONE 20 MG/1
20 TABLET ORAL DAILY
Qty: 7 TABLET | Refills: 0 | Status: SHIPPED | OUTPATIENT
Start: 2018-01-29 | End: 2018-04-10

## 2018-01-29 RX ORDER — HYDROXYZINE HYDROCHLORIDE 25 MG/1
25-50 TABLET, FILM COATED ORAL
Qty: 20 TABLET | Refills: 0 | Status: SHIPPED | OUTPATIENT
Start: 2018-01-29 | End: 2018-02-06

## 2018-01-29 RX ORDER — FLUCONAZOLE 150 MG/1
TABLET ORAL
Qty: 2 TABLET | Refills: 0 | Status: SHIPPED | OUTPATIENT
Start: 2018-01-29 | End: 2018-01-29

## 2018-01-29 NOTE — LETTER
My Depression Action Plan  Name: Charlene Morales   Date of Birth 1965  Date: 1/29/2018    My doctor: Tania Vázquez   My clinic: [address] [address] [phone]          GREEN    ZONE   Good Control    What it looks like:     Things are going generally well. You have normal up s and down s. You may even feel depressed from time to time, but bad moods usually last less than a day.   What you need to do:  1. Continue to care for yourself (see self care plan)  2. Check your depression survival kit and update it as needed  3. Follow your physician s recommendations including any medication.  4. Do not stop taking medication unless you consult with your physician first.           YELLOW         ZONE Getting Worse    What it looks like:     Depression is starting to interfere with your life.     It may be hard to get out of bed; you may be starting to isolate yourself from others.    Symptoms of depression are starting to last most all day and this has happened for several days.     You may have suicidal thoughts but they are not constant.   What you need to do:     1. Call your care team, your response to treatment will improve if you keep your care team informed of your progress. Yellow periods are signs an adjustment may need to be made.     2. Continue your self-care, even if you have to fake it!    3. Talk to someone in your support network    4. Open up your depression survival kit           RED    ZONE Medical Alert - Get Help    What it looks like:     Depression is seriously interfering with your life.     You may experience these or other symptoms: You can t get out of bed most days, can t work or engage in other necessary activities, you have trouble taking care of basic hygiene, or basic responsibilities, thoughts of suicide or death that will not go away, self-injurious behavior.     What you need to do:  1. Call your care team  and request a same-day appointment. If they are not available (weekends or after hours) call your local crisis line, emergency room or 911.      Electronically signed by: Meghan Fischer, January 29, 2018    Depression Self Care Plan / Survival Kit    Self-Care for Depression  Here s the deal. Your body and mind are really not as separate as most people think.  What you do and think affects how you feel and how you feel influences what you do and think. This means if you do things that people who feel good do, it will help you feel better.  Sometimes this is all it takes.  There is also a place for medication and therapy depending on how severe your depression is, so be sure to consult with your medical provider and/ or Behavioral Health Consultant if your symptoms are worsening or not improving.     In order to better manage my stress, I will:    Exercise  Get some form of exercise, every day. This will help reduce pain and release endorphins, the  feel good  chemicals in your brain. This is almost as good as taking antidepressants!  This is not the same as joining a gym and then never going! (they count on that by the way ) It can be as simple as just going for a walk or doing some gardening, anything that will get you moving.      Hygiene   Maintain good hygiene (Get out of bed in the morning, Make your bed, Brush your teeth, Take a shower, and Get dressed like you were going to work, even if you are unemployed).  If your clothes don't fit try to get ones that do.    Diet  I will strive to eat foods that are good for me, drink plenty of water, and avoid excessive sugar, caffeine, alcohol, and other mood-altering substances.  Some foods that are helpful in depression are: complex carbohydrates, B vitamins, flaxseed, fish or fish oil, fresh fruits and vegetables.    Psychotherapy  I agree to participate in Individual Therapy (if recommended).    Medication  If prescribed medications, I agree to take them.  Missing  doses can result in serious side effects.  I understand that drinking alcohol, or other illicit drug use, may cause potential side effects.  I will not stop my medication abruptly without first discussing it with my provider.    Staying Connected With Others  I will stay in touch with my friends, family members, and my primary care provider/team.    Use your imagination  Be creative.  We all have a creative side; it doesn t matter if it s oil painting, sand castles, or mud pies! This will also kick up the endorphins.    Witness Beauty  (AKA stop and smell the roses) Take a look outside, even in mid-winter. Notice colors, textures. Watch the squirrels and birds.     Service to others  Be of service to others.  There is always someone else in need.  By helping others we can  get out of ourselves  and remember the really important things.  This also provides opportunities for practicing all the other parts of the program.    Humor  Laugh and be silly!  Adjust your TV habits for less news and crime-drama and more comedy.    Control your stress  Try breathing deep, massage therapy, biofeedback, and meditation. Find time to relax each day.     My support system    Clinic Contact:  Phone number:    Contact 1:  Phone number:    Contact 2:  Phone number:    Gnosticist/:  Phone number:    Therapist:  Phone number:    Local crisis center:    Phone number:    Other community support:  Phone number:

## 2018-01-29 NOTE — PROGRESS NOTES
SUBJECTIVE:   Charlene Morales is a 52 year old female who presents to clinic today for the following health issues:    Rash      Duration: 10 days     Description  Location: started under breasts, has spread to abdomen, bilateral armpits and neck  Itching: severe    Intensity:  severe    Accompanying signs and symptoms: None    History (similar episodes/previous evaluation): None    Precipitating or alleviating factors:  New exposures:  None  Recent travel: no      Therapies tried and outcome: cortisone, antifungal, sarna are mildly effective    Has been present 2 weeks.  Started under her breasts but now present on her abdomen, upper arms and neck.  Intense itching.  Has bene using anitfungal cream with minimal relief.  Has been scratching a lot.      Was on minocycline for years but stopped as she was worried it was causing the rash.    No new meds or soaps or detergents.  No recent travel    Problem list and histories reviewed & adjusted, as indicated.  Additional history: as documented    Reviewed and updated as needed this visit by clinical staff  Tobacco  Allergies  Meds  Med Hx  Surg Hx  Fam Hx  Soc Hx      Reviewed and updated as needed this visit by Provider  Tobacco  Med Hx  Surg Hx  Fam Hx  Soc Hx        ROS: Remainder of Constitutional, CV, Respiratory, GI,  negative with exception of that mentioned above    PE:  VS as above   Gen:  WN/WD/WH female in NAD   Skin:  Rash present under breasts and on abdomen.  Not really in groin folds, more on surface of lower abdomen near umbilicus.  Rash in parts dark and brawny appearing but on abdomen appears more urticarial.  More urticarial rash on both upper inner arms but not in axilla.  Scant similar rash on upper chest      A/P:      ICD-10-CM    1. Urticaria L50.9 hydrOXYzine (ATARAX) 25 MG tablet     predniSONE (DELTASONE) 20 MG tablet   2. Allergic dermatitis L23.9 hydrOXYzine (ATARAX) 25 MG tablet     Patient Instructions   We'll treat an  allergic skin reaction with oral prednisone.  Please follow dosing instructions.  Should be getting better in a few days.      You can use the hydroxyzine in place of benadryl at bedtime.  Try zyrtec during the day for itch.  If not improved following the second dose please let me know    I would recommend avoiding the new soap and the lotion.        Unclear etiology for rash.  Locations not completely c/w yeast and appears more urticarial.  Trial as above

## 2018-01-29 NOTE — MR AVS SNAPSHOT
After Visit Summary   1/29/2018    Charlene Morales    MRN: [id number]           Patient Information     Date Of Birth          1965        Visit Information        Provider Department      1/29/2018 1:40 PM Roya Bautista DO [facility]        Today's Diagnoses     Urticaria    -  1    Allergic dermatitis          Care Instructions    We'll treat an allergic skin reaction with oral prednisone.  Please follow dosing instructions.  Should be getting better in a few days.      You can use the hydroxyzine in place of benadryl at bedtime.  Try zyrtec during the day for itch.  If not improved following the second dose please let me know    I would recommend avoiding the new soap and the lotion.              Follow-ups after your visit        Who to contact     Normal or non-critical lab and imaging results will be communicated to you by Vista[employer], letter or phone within 4 business days after the clinic has received the results. If you do not hear from us within 7 days, please contact the clinic through Vistaart or phone. If you have a critical or abnormal lab result, we will notify you by phone as soon as possible.  Submit refill requests through Desura or call your pharmacy and they will forward the refill request to us. Please allow 3 business days for your refill to be completed.          If you need to speak with a  for additional information , please call: [phone]           Additional Information About Your Visit        Desura Information     Desura gives you secure access to your electronic health record. If you see a primary care provider, you can also send messages to your care team and make appointments. If you have questions, please call your primary care clinic.  If you do not have a primary care provider, please call [phone] and they will assist you.        Care EveryWhere ID     This is your Care EveryWhere ID. This could be used by other  "organizations to access your [facility] medical records  [id number]        Your Vitals Were     Pulse Temperature Height Breastfeeding? BMI (Body Mass Index)       96 97.5  F (36.4  C) (Tympanic) 5' 4.75\" (1.645 m) No 32.03 kg/m2        Blood Pressure from Last 3 Encounters:   01/29/18 124/80   01/18/18 126/72   03/10/17 (!) 152/102    Weight from Last 3 Encounters:   01/29/18 191 lb (86.6 kg)   03/10/17 183 lb 6.4 oz (83.2 kg)   05/09/16 189 lb 12.8 oz (86.1 kg)              We Performed the Following     DEPRESSION ACTION PLAN (DAP)          Today's Medication Changes          These changes are accurate as of 1/29/18  2:36 PM.  If you have any questions, ask your nurse or doctor.               Start taking these medicines.        Dose/Directions    hydrOXYzine 25 MG tablet   Commonly known as:  ATARAX   Used for:  Urticaria, Allergic dermatitis   Started by:  Roya Bautista DO        Dose:  25-50 mg   Take 1-2 tablets (25-50 mg) by mouth nightly as needed for itching   Quantity:  20 tablet   Refills:  0       predniSONE 20 MG tablet   Commonly known as:  DELTASONE   Used for:  Urticaria   Started by:  Roya Bautista DO        Dose:  20 mg   Take 1 tablet (20 mg) by mouth daily Then take 1/2 tablet daily for 4 days then stop   Quantity:  7 tablet   Refills:  0         Stop taking these medicines if you haven't already. Please contact your care team if you have questions.     ACETAMINOPHEN   Stopped by:  Roya Bautista DO                Where to get your medicines      These medications were sent to [facility] [place]  [place] - [address]  [address], [facility] [address]     Phone:  [phone]     hydrOXYzine 25 MG tablet    predniSONE 20 MG tablet                Primary Care Provider Office Phone # Fax #    NADINE Cooper [facility] [phone] [address] [place] [address]        Equal Access to Services     LYSSA MARTINEZ AH: Micha turner " ant Thompson, waaxda luqadaha, qaybta kaalmada amy, alanis galan adelavishal natalyatiera lagarcíarenetta alpa. So [facility] [phone].    ATENCIÓN: Si jessicala benito, tiene a talley disposición servicios gratuitos de asistencia lingüística. Jackeline al [phone].    We comply with applicable federal civil rights laws and [place] laws. We do not discriminate on the basis of race, color, national origin, age, disability, sex, sexual orientation, or gender identity.            Thank you!     Thank you for choosing [facility]  for your care. Our goal is always to provide you with excellent care. Hearing back from our patients is one way we can continue to improve our services. Please take a few minutes to complete the written survey that you may receive in the mail after your visit with us. Thank you!             Your Updated Medication List - Protect others around you: Learn how to safely use, store and throw away your medicines at www.disposemymeds.org.          This list is accurate as of 1/29/18  2:36 PM.  Always use your most recent med list.                   Brand Name Dispense Instructions for use Diagnosis    atorvastatin 40 MG tablet    LIPITOR    90 tablet    Take 1 tablet (40 mg) by mouth daily    Hyperlipidemia LDL goal <130       chlorthalidone 25 MG tablet    HYGROTON    45 tablet    TAKE ONE-HALF TABLET BY MOUTH EVERY DAY    Hypertension, benign essential, goal below 140/90       hydrOXYzine 25 MG tablet    ATARAX    20 tablet    Take 1-2 tablets (25-50 mg) by mouth nightly as needed for itching    Urticaria, Allergic dermatitis       ibuprofen 200 MG tablet    ADVIL/MOTRIN    120    5 orally at onset of headache    Headache(784.0)       lisinopril 40 MG tablet    PRINIVIL/ZESTRIL    90 tablet    Take 1 tablet (40 mg) by mouth daily    Hypertension, benign essential, goal below 140/90       omeprazole 20 MG CR capsule    priLOSEC    180 capsule    TAKE TWO CAPSULES (40MG)  BY MOUTH EVERY DAY     Peptic ulcer       PARoxetine 40 MG tablet    PAXIL    90 tablet    TAKE ONE TABLET BY MOUTH EVERY DAY    Major depressive disorder, single episode, moderate (H)       predniSONE 20 MG tablet    DELTASONE    7 tablet    Take 1 tablet (20 mg) by mouth daily Then take 1/2 tablet daily for 4 days then stop    Urticaria       sucralfate 1 GM tablet    CARAFATE    120 tablet    TAKE ONE TABLET BY MOUTH FOUR TIMES A DAY    Abdominal pain, right upper quadrant, History of gastric ulcer       traZODone 100 MG tablet    DESYREL    60 tablet    Take 1-2 tablets (100-200 mg) by mouth At Bedtime    Primary insomnia

## 2018-01-29 NOTE — PATIENT INSTRUCTIONS
We'll treat an allergic skin reaction with oral prednisone.  Please follow dosing instructions.  Should be getting better in a few days.      You can use the hydroxyzine in place of benadryl at bedtime.  Try zyrtec during the day for itch.  If not improved following the second dose please let me know    I would recommend avoiding the new soap and the lotion.

## 2018-01-29 NOTE — NURSING NOTE
"Initial /80  Pulse 96  Temp 97.5  F (36.4  C) (Tympanic)  Ht 5' 4.75\" (1.645 m)  Wt 191 lb (86.6 kg)  Breastfeeding? No  BMI 32.03 kg/m2 Estimated body mass index is 32.03 kg/(m^2) as calculated from the following:    Height as of this encounter: 5' 4.75\" (1.645 m).    Weight as of this encounter: 191 lb (86.6 kg). .      "

## 2018-01-30 ASSESSMENT — PATIENT HEALTH QUESTIONNAIRE - PHQ9: SUM OF ALL RESPONSES TO PHQ QUESTIONS 1-9: 4

## 2018-02-05 ENCOUNTER — MYC MEDICAL ADVICE (OUTPATIENT)
Dept: FAMILY MEDICINE | Facility: CLINIC | Age: 53
End: 2018-02-05

## 2018-02-05 NOTE — TELEPHONE ENCOUNTER
Please call pt, she needs to be reevaluated.   Okay on one of my same day or with Tania who I believe is her primary.  Thanks.    Roya Bautista

## 2018-02-06 ENCOUNTER — MYC MEDICAL ADVICE (OUTPATIENT)
Dept: FAMILY MEDICINE | Facility: CLINIC | Age: 53
End: 2018-02-06

## 2018-02-06 ENCOUNTER — OFFICE VISIT (OUTPATIENT)
Dept: FAMILY MEDICINE | Facility: CLINIC | Age: 53
End: 2018-02-06
Payer: COMMERCIAL

## 2018-02-06 VITALS
OXYGEN SATURATION: 98 % | SYSTOLIC BLOOD PRESSURE: 122 MMHG | HEART RATE: 76 BPM | BODY MASS INDEX: 30.53 KG/M2 | TEMPERATURE: 98.2 F | DIASTOLIC BLOOD PRESSURE: 86 MMHG | HEIGHT: 66 IN | WEIGHT: 190 LBS

## 2018-02-06 DIAGNOSIS — L50.9 URTICARIA: ICD-10-CM

## 2018-02-06 DIAGNOSIS — L23.9 ALLERGIC DERMATITIS: ICD-10-CM

## 2018-02-06 PROCEDURE — 99213 OFFICE O/P EST LOW 20 MIN: CPT | Performed by: FAMILY MEDICINE

## 2018-02-06 RX ORDER — HYDROXYZINE HYDROCHLORIDE 25 MG/1
25-50 TABLET, FILM COATED ORAL
Qty: 20 TABLET | Refills: 0 | Status: SHIPPED | OUTPATIENT
Start: 2018-02-06 | End: 2018-04-10

## 2018-02-06 RX ORDER — PREDNISONE 10 MG/1
5 TABLET ORAL DAILY
Qty: 2 TABLET | Refills: 0 | Status: SHIPPED | OUTPATIENT
Start: 2018-02-06 | End: 2018-04-10

## 2018-02-06 NOTE — PROGRESS NOTES
"  SUBJECTIVE:   Charlene Morales is a 52 year old female who presents to clinic today for the following health issues:        Rash      Duration: 1 month follow up     Description  Location: under breast, abdomen, arm pits, neck   Itching: severe    Intensity:  severe    Accompanying signs and symptoms: None    History (similar episodes/previous evaluation): was seen in clinic 1/29/2018    Precipitating or alleviating factors:  New exposures:  None  Recent travel: no      Therapies tried and outcome: Atarax/hydroxyzine -  not effective and Prednisone, cleared up rash for the most part.    Pt felt the prednisone was very effective for rash and itch.  seemed to lessen itch immediately and over the course decreased rash as well.  Still having some itching, mostly at night.  Has some residual \"dots\" left over in areas of rash.  Overall much better but not yet resolved    She stopped her scented lotion, has not been using any lotion now.  Does feel skin is dry.  Most intense itching now at the belly button    Problem list and histories reviewed & adjusted, as indicated.  Additional history: as documented    Reviewed and updated as needed this visit by clinical staff  Tobacco  Allergies  Meds  Med Hx  Surg Hx  Fam Hx  Soc Hx      Reviewed and updated as needed this visit by Provider  Tobacco  Med Hx  Surg Hx  Fam Hx  Soc Hx        ROS: Remainder of Constitutional, CV, Respiratory, GI,  negative with exception of that mentioned above    PE:  VS as above   Gen:  WN/WD/WH female in NAD   Skin:  Resolved urticarial type rash on abdomen and under arms.  Scattered excoriated mildly erythematous papules over chest.    A/P:      ICD-10-CM    1. Urticaria L50.9 hydrOXYzine (ATARAX) 25 MG tablet     predniSONE (DELTASONE) 10 MG tablet   2. Allergic dermatitis L23.9 hydrOXYzine (ATARAX) 25 MG tablet     predniSONE (DELTASONE) 10 MG tablet     Patient Instructions   We'll have your start the zyrtec in the morning and " continue the hydroxyzine at bedtime.  Try the cetaphil moisturizer twice daily all over.  Try some hydrocortisone on the belly button under the lotion.

## 2018-02-06 NOTE — PATIENT INSTRUCTIONS
We'll have your start the zyrtec in the morning and continue the hydroxyzine at bedtime.  Try the cetaphil moisturizer twice daily all over.  Try some hydrocortisone on the belly button under the lotion.

## 2018-02-06 NOTE — MR AVS SNAPSHOT
After Visit Summary   2/6/2018    Charlene Morales    MRN: [id number]           Patient Information     Date Of Birth          1965        Visit Information        Provider Department      2/6/2018 3:20 PM Roya Bautista,  [facility]        Today's Diagnoses     Urticaria        Allergic dermatitis          Care Instructions    We'll have your start the zyrtec in the morning and continue the hydroxyzine at bedtime.  Try the cetaphil moisturizer twice daily all over.  Try some hydrocortisone on the belly button under the lotion.            Follow-ups after your visit        Who to contact     Normal or non-critical lab and imaging results will be communicated to you by [employer], letter or phone within 4 business days after the clinic has received the results. If you do not hear from us within 7 days, please contact the clinic through [employer] or phone. If you have a critical or abnormal lab result, we will notify you by phone as soon as possible.  Submit refill requests through [employer]ge[employer] or call your pharmacy and they will forward the refill request to us. Please allow 3 business days for your refill to be completed.          If you need to speak with a  for additional information , please call: [phone]           Additional Information About Your Visit        [employer] Information     [employer] gives you secure access to your electronic health record. If you see a primary care provider, you can also send messages to your care team and make appointments. If you have questions, please call your primary care clinic.  If you do not have a primary care provider, please call [phone] and they will assist you.        Care EveryWhere ID     This is your Care EveryWhere ID. This could be used by other organizations to access your [facility] medical records  [id number]        Your Vitals Were     Pulse Temperature Height Pulse Oximetry BMI (Body Mass Index)       76 98.2  " F (36.8  C) (Tympanic) 5' 5.5\" (1.664 m) 98% 31.14 kg/m2        Blood Pressure from Last 3 Encounters:   02/06/18 122/86   01/29/18 124/80   01/18/18 126/72    Weight from Last 3 Encounters:   02/06/18 190 lb (86.2 kg)   01/29/18 191 lb (86.6 kg)   03/10/17 183 lb 6.4 oz (83.2 kg)              Today, you had the following     No orders found for display         Today's Medication Changes          These changes are accurate as of 2/6/18  3:56 PM.  If you have any questions, ask your nurse or doctor.               These medicines have changed or have updated prescriptions.        Dose/Directions    sucralfate 1 GM tablet   Commonly known as:  CARAFATE   This may have changed:  See the new instructions.   Used for:  Abdominal pain, right upper quadrant, History of gastric ulcer        TAKE ONE TABLET BY MOUTH FOUR TIMES A DAY   Quantity:  120 tablet   Refills:  0            Where to get your medicines      These medications were sent to [facility] [address]  [address] [place] [address]     Phone:  [phone]     hydrOXYzine 25 MG tablet                Primary Care Provider Office Phone # Fax #    Tania Vázquez, NADINE [facility] [phone]       [address] [address]        Equal Access to Services     LYSSA MARTINEZ AH: Hadii delaney turner hadasho Sojosieali, waaxda luqadaha, qaybta kaalmada adeegyada, alanis pérez . So [facility] [place] [phone].    ATENCIÓN: Si habla español, tiene a talley disposición servicios gratuitos de asistencia lingüística. Llame al [phone].    We comply with applicable federal civil rights laws and [place] laws. We do not discriminate on the basis of race, color, national origin, age, disability, sex, sexual orientation, or gender identity.            Thank you!     Thank you for choosing [facility]  for your care. Our goal is always to provide you with excellent care. " Hearing back from our patients is one way we can continue to improve our services. Please take a few minutes to complete the written survey that you may receive in the mail after your visit with us. Thank you!             Your Updated Medication List - Protect others around you: Learn how to safely use, store and throw away your medicines at www.disposemymeds.org.          This list is accurate as of 2/6/18  3:56 PM.  Always use your most recent med list.                   Brand Name Dispense Instructions for use Diagnosis    atorvastatin 40 MG tablet    LIPITOR    90 tablet    Take 1 tablet (40 mg) by mouth daily    Hyperlipidemia LDL goal <130       chlorthalidone 25 MG tablet    HYGROTON    45 tablet    TAKE ONE-HALF TABLET BY MOUTH EVERY DAY    Hypertension, benign essential, goal below 140/90       hydrOXYzine 25 MG tablet    ATARAX    20 tablet    Take 1-2 tablets (25-50 mg) by mouth nightly as needed for itching    Urticaria, Allergic dermatitis       ibuprofen 200 MG tablet    ADVIL/MOTRIN    120    5 orally at onset of headache    Headache(784.0)       lisinopril 40 MG tablet    PRINIVIL/ZESTRIL    90 tablet    Take 1 tablet (40 mg) by mouth daily    Hypertension, benign essential, goal below 140/90       omeprazole 20 MG CR capsule    priLOSEC    180 capsule    TAKE TWO CAPSULES (40MG)  BY MOUTH EVERY DAY    Peptic ulcer       PARoxetine 40 MG tablet    PAXIL    90 tablet    TAKE ONE TABLET BY MOUTH EVERY DAY    Major depressive disorder, single episode, moderate (H)       predniSONE 20 MG tablet    DELTASONE    7 tablet    Take 1 tablet (20 mg) by mouth daily Then take 1/2 tablet daily for 4 days then stop    Urticaria       sucralfate 1 GM tablet    CARAFATE    120 tablet    TAKE ONE TABLET BY MOUTH FOUR TIMES A DAY    Abdominal pain, right upper quadrant, History of gastric ulcer       traZODone 100 MG tablet    DESYREL    60 tablet    Take 1-2 tablets (100-200 mg) by mouth At Bedtime    Primary  insomnia

## 2018-02-07 ENCOUNTER — TELEPHONE (OUTPATIENT)
Dept: FAMILY MEDICINE | Facility: CLINIC | Age: 53
End: 2018-02-07

## 2018-02-07 NOTE — TELEPHONE ENCOUNTER
Could you please call pt.  I left her a voicemail last night asking her to check her MyChart for a change in her plan and she did not.  Please try again.  There is a MyChart note with the new plan.  I basically just want her to continue the prednisone for another few days at a lower dose.    Thanks    Roya Bautista

## 2018-02-07 NOTE — TELEPHONE ENCOUNTER
Left message on both cell and work phones to review My Chart note by Dr Bautista or call our office for direction. Given team  number. Arabella Watts RN

## 2018-03-12 DIAGNOSIS — I10 HYPERTENSION, BENIGN ESSENTIAL, GOAL BELOW 140/90: ICD-10-CM

## 2018-03-12 NOTE — TELEPHONE ENCOUNTER
"Requested Prescriptions   Pending Prescriptions Disp Refills     chlorthalidone (HYGROTON) 25 MG tablet [Pharmacy Med Name: CHLORTHALIDONE 25MG TABS] 45 tablet 0     Sig: TAKE ONE-HALF TABLET BY MOUTH DAILY    Diuretics (Including Combos) Protocol Failed    3/12/2018  9:06 AM       Failed - Normal serum creatinine on file in past 12 months    Recent Labs   Lab Test  03/10/17   0844   CR  0.92             Failed - Normal serum potassium on file in past 12 months    Recent Labs   Lab Test  03/10/17   0844   POTASSIUM  3.8                   Failed - Normal serum sodium on file in past 12 months    Recent Labs   Lab Test  03/10/17   0844   NA  146*             Passed - Blood pressure under 140/90 in past 12 months    BP Readings from Last 3 Encounters:   02/06/18 122/86   01/29/18 124/80   01/18/18 126/72                Passed - Recent (12 mo) or future (30 days) visit within the authorizing provider's specialty    Patient had office visit in the last 12 months or has a visit in the next 30 days with authorizing provider or within the authorizing provider's specialty.  See \"Patient Info\" tab in inbasket, or \"Choose Columns\" in Meds & Orders section of the refill encounter.           Passed - Patient is age 18 or older       Passed - No active pregancy on record       Passed - No positive pregnancy test in past 12 months        Last Written Prescription Date:  1/3/18  Last Fill Quantity: 45,  # refills: 0   Last office visit: 2/6/2018 with prescribing provider:  ABEL   Future Office Visit:      "

## 2018-03-14 RX ORDER — CHLORTHALIDONE 25 MG/1
TABLET ORAL
Qty: 15 TABLET | Refills: 0 | Status: SHIPPED | OUTPATIENT
Start: 2018-03-14 | End: 2018-04-05

## 2018-03-14 NOTE — TELEPHONE ENCOUNTER
Prescription approved per [facility] Refill Protocol or patient Primary care provider (PCP)  LAUREN Holliday RN/Marc Briscoe

## 2018-03-23 DIAGNOSIS — F51.01 PRIMARY INSOMNIA: ICD-10-CM

## 2018-03-23 RX ORDER — TRAZODONE HYDROCHLORIDE 100 MG/1
TABLET ORAL
Qty: 60 TABLET | Refills: 0 | Status: SHIPPED | OUTPATIENT
Start: 2018-03-23 | End: 2018-04-09

## 2018-03-23 NOTE — TELEPHONE ENCOUNTER
"Requested Prescriptions   Pending Prescriptions Disp Refills     traZODone (DESYREL) 100 MG tablet [Pharmacy Med Name: TRAZODONE HCL 100MG TABS] 60 tablet 11    Last Written Prescription Date:  03/10/2017 #60 x 11  Last filled 02/20/2018  Last office visit: 2/6/2018 AISHA Bautista   Future Office Visit:  None   Sig: TAKE ONE OR TWO TABLETS BY MOUTH AT BEDTIME    Serotonin Modulators Passed    3/23/2018  9:29 AM       Passed - Recent (12 mo) or future (30 days) visit within the authorizing provider's specialty    Patient had office visit in the last 12 months or has a visit in the next 30 days with authorizing provider or within the authorizing provider's specialty.  See \"Patient Info\" tab in inbasket, or \"Choose Columns\" in Meds & Orders section of the refill encounter.           Passed - Patient is age 18 or older       Passed - No active pregnancy on record       Passed - No positive pregnancy test in past 12 months          "

## 2018-04-05 DIAGNOSIS — K27.9 PEPTIC ULCER: ICD-10-CM

## 2018-04-05 DIAGNOSIS — I10 HYPERTENSION, BENIGN ESSENTIAL, GOAL BELOW 140/90: ICD-10-CM

## 2018-04-06 NOTE — TELEPHONE ENCOUNTER
"Requested Prescriptions   Pending Prescriptions Disp Refills     chlorthalidone (HYGROTON) 25 MG tablet [Pharmacy Med Name: CHLORTHALIDONE 25MG TABS] 15 tablet 0    Last Written Prescription Date:  03/14/2018 #15 x 0  Last filled 03/14/2018  Last office visit: 2/6/2018 AISHA Bautista   Future Office Visit:   Next 5 appointments (look out 90 days)     Apr 10, 2018  3:20 PM CDT   Chintan Ayala with NADINE Cooper CNP   [facility] ([facility]    [address] [address]   [phone]                  Sig: TAKE ONE-HALF TABLET BY MOUTH EVERY DAY (NEED APPOINTMENT FOR FURTHER REFILLS)    Diuretics (Including Combos) Protocol Failed    4/5/2018  2:17 PM       Failed - Normal serum creatinine on file in past 12 months    Recent Labs   Lab Test  03/10/17   0844   CR  0.92             Failed - Normal serum potassium on file in past 12 months    Recent Labs   Lab Test  03/10/17   0844   POTASSIUM  3.8                   Failed - Normal serum sodium on file in past 12 months    Recent Labs   Lab Test  03/10/17   0844   NA  146*             Passed - Blood pressure under 140/90 in past 12 months    BP Readings from Last 3 Encounters:   02/06/18 122/86   01/29/18 124/80   01/18/18 126/72                Passed - Recent (12 mo) or future (30 days) visit within the authorizing provider's specialty    Patient had office visit in the last 12 months or has a visit in the next 30 days with authorizing provider or within the authorizing provider's specialty.  See \"Patient Info\" tab in inbasket, or \"Choose Columns\" in Meds & Orders section of the refill encounter.           Passed - Patient is age 18 or older       Passed - No active pregancy on record       Passed - No positive pregnancy test in past 12 months        omeprazole (PRILOSEC) 20 MG CR capsule [Pharmacy Med Name: OMEPRAZOLE 20MG CPDR] 180 capsule 1    Last Written Prescription Date:  09/13/2017  #180 x 1  Last " "filled 12/18/2017  Last office visit: 2/6/2018 AISHA Bautista   Future Office Visit:   Next 5 appointments (look out 90 days)     Apr 10, 2018  3:20 PM CDT   Chintan Ayala with NADINE Cooepr CNP   [facility] ([facility])    [address] [address]   [phone]                  Sig: TAKE TWO CAPSULES (40MG)  BY MOUTH EVERY DAY    PPI Protocol Passed    4/5/2018  2:17 PM       Passed - Not on Clopidogrel (unless Pantoprazole ordered)       Passed - No diagnosis of osteoporosis on record       Passed - Recent (12 mo) or future (30 days) visit within the authorizing provider's specialty    Patient had office visit in the last 12 months or has a visit in the next 30 days with authorizing provider or within the authorizing provider's specialty.  See \"Patient Info\" tab in inbasket, or \"Choose Columns\" in Meds & Orders section of the refill encounter.           Passed - Patient is age 18 or older       Passed - No active pregnacy on record       Passed - No positive pregnancy test in past 12 months          "

## 2018-04-09 DIAGNOSIS — I10 HYPERTENSION, BENIGN ESSENTIAL, GOAL BELOW 140/90: ICD-10-CM

## 2018-04-09 DIAGNOSIS — E78.5 HYPERLIPIDEMIA LDL GOAL <130: ICD-10-CM

## 2018-04-09 DIAGNOSIS — F51.01 PRIMARY INSOMNIA: ICD-10-CM

## 2018-04-09 NOTE — TELEPHONE ENCOUNTER
"Requested Prescriptions   Pending Prescriptions Disp Refills     traZODone (DESYREL) 100 MG tablet [Pharmacy Med Name: TRAZODONE HCL 100MG TABS] 60 tablet 0    Last Written Prescription Date:  03/23/2018  #60 x 0  Last filled 03/23/2018  Last office visit: 02/06/2018 AISHA Bautista   Future Office Visit:   Next 5 appointments (look out 90 days)     Apr 10, 2018  3:20 PM CDT   Chintan Ayala with NADINE Cooper CNP   [facility]    [address] [id number] [phone]                  Sig: TAKE ONE OR TWO TABLETS BY MOUTH EVERY NIGHT AT BEDTIME    Serotonin Modulators Passed    4/9/2018  4:03 PM       Passed - Recent (12 mo) or future (30 days) visit within the authorizing provider's specialty    Patient had office visit in the last 12 months or has a visit in the next 30 days with authorizing provider or within the authorizing provider's specialty.  See \"Patient Info\" tab in inbasket, or \"Choose Columns\" in Meds & Orders section of the refill encounter.           Passed - Patient is age 18 or older       Passed - No active pregnancy on record       Passed - No positive pregnancy test in past 12 months        atorvastatin (LIPITOR) 40 MG tablet [Pharmacy Med Name: ATORVASTATIN CALCIUM 40MG TABS] 90 tablet 3    Last Written Prescription Date:  03/14/2017 #90 x 3  Last filled 02/20/2018  Last office visit: 2/6/2018 AISHA Bautista   Future Office Visit:   Next 5 appointments (look out 90 days)     Apr 10, 2018  3:20 PM CDT   Chintan Ayala with NADINE Cooper CNP   [facility]    [address] [id number] [phone]                  Sig: TAKE ONE TABLET BY MOUTH EVERY DAY    Statins Protocol Failed    4/9/2018  4:03 PM       Failed - LDL on file in past 12 months    Recent Labs   Lab Test  03/10/17   0844   LDL  171*            Passed - No abnormal creatine kinase in " "past 12 months    No lab results found.            Passed - Recent (12 mo) or future (30 days) visit within the authorizing provider's specialty    Patient had office visit in the last 12 months or has a visit in the next 30 days with authorizing provider or within the authorizing provider's specialty.  See \"Patient Info\" tab in inbasket, or \"Choose Columns\" in Meds & Orders section of the refill encounter.           Passed - Patient is age 18 or older       Passed - No active pregnancy on record       Passed - No positive pregnancy test in past 12 months        lisinopril (PRINIVIL/ZESTRIL) 40 MG tablet [Pharmacy Med Name: LISINOPRIL 40MG TABS] 90 tablet 3    Last Written Prescription Date:  03/10/2017 #90 x 3  Last filled 01/18/2018  Last office visit: 2/6/2018 AISHA Bautista   Future Office Visit:   Next 5 appointments (look out 90 days)     Apr 10, 2018  3:20 PM CDT   Chintan Ayala with NADINE Cooper CNP   [facility]    [address] [address]   [phone]                  Sig: TAKE ONE TABLET BY MOUTH EVERY DAY    ACE Inhibitors (Including Combos) Protocol Failed    4/9/2018  4:03 PM       Failed - Normal serum creatinine on file in past 12 months    Recent Labs   Lab Test  03/10/17   0844   CR  0.92            Failed - Normal serum potassium on file in past 12 months    Recent Labs   Lab Test  03/10/17   0844   POTASSIUM  3.8            Passed - Blood pressure under 140/90 in past 12 months    BP Readings from Last 3 Encounters:   02/06/18 122/86   01/29/18 124/80   01/18/18 126/72                Passed - Recent (12 mo) or future (30 days) visit within the authorizing provider's specialty    Patient had office visit in the last 12 months or has a visit in the next 30 days with authorizing provider or within the authorizing provider's specialty.  See \"Patient Info\" tab in inbasket, or \"Choose Columns\" in Meds & Orders section of the " refill encounter.           Passed - Patient is age 18 or older       Passed - No active pregnancy on record       Passed - No positive pregnancy test in past 12 months

## 2018-04-09 NOTE — TELEPHONE ENCOUNTER
Routing refill request for chlorthalidone to provider for review/approval because:  Labs not current:  Sodium, K+, creatinine (last Cr and BMP 3/10/17)  Rhoda given x1 in March    Has OV scheduled for 4/10/18.    Prescription for omeprazole approved per [facility] Refill Protocol.    Radha MCKEON RN

## 2018-04-10 ENCOUNTER — OFFICE VISIT (OUTPATIENT)
Dept: FAMILY MEDICINE | Facility: CLINIC | Age: 53
End: 2018-04-10
Payer: COMMERCIAL

## 2018-04-10 VITALS
RESPIRATION RATE: 18 BRPM | HEART RATE: 88 BPM | SYSTOLIC BLOOD PRESSURE: 106 MMHG | WEIGHT: 191.8 LBS | BODY MASS INDEX: 31.43 KG/M2 | DIASTOLIC BLOOD PRESSURE: 68 MMHG | TEMPERATURE: 97.8 F

## 2018-04-10 DIAGNOSIS — S46.911A STRAIN OF RIGHT SHOULDER, INITIAL ENCOUNTER: Primary | ICD-10-CM

## 2018-04-10 DIAGNOSIS — I10 HYPERTENSION, BENIGN ESSENTIAL, GOAL BELOW 140/90: ICD-10-CM

## 2018-04-10 DIAGNOSIS — M62.89 PFD (PELVIC FLOOR DYSFUNCTION): ICD-10-CM

## 2018-04-10 DIAGNOSIS — F32.1 MAJOR DEPRESSIVE DISORDER, SINGLE EPISODE, MODERATE (H): ICD-10-CM

## 2018-04-10 DIAGNOSIS — E78.5 HYPERLIPIDEMIA LDL GOAL <130: ICD-10-CM

## 2018-04-10 PROCEDURE — 99214 OFFICE O/P EST MOD 30 MIN: CPT | Performed by: NURSE PRACTITIONER

## 2018-04-10 RX ORDER — LISINOPRIL 40 MG/1
40 TABLET ORAL DAILY
Qty: 90 TABLET | Refills: 3 | Status: SHIPPED | OUTPATIENT
Start: 2018-04-10 | End: 2019-05-20

## 2018-04-10 RX ORDER — ATORVASTATIN CALCIUM 40 MG/1
40 TABLET, FILM COATED ORAL DAILY
Qty: 90 TABLET | Refills: 3 | Status: SHIPPED | OUTPATIENT
Start: 2018-04-10 | End: 2019-06-06

## 2018-04-10 RX ORDER — PAROXETINE 40 MG/1
40 TABLET, FILM COATED ORAL DAILY
Qty: 90 TABLET | Refills: 2 | Status: SHIPPED | OUTPATIENT
Start: 2018-04-10 | End: 2019-03-29

## 2018-04-10 RX ORDER — CHLORTHALIDONE 25 MG/1
TABLET ORAL
Qty: 45 TABLET | Refills: 3 | Status: SHIPPED | OUTPATIENT
Start: 2018-04-10 | End: 2019-01-21

## 2018-04-10 RX ORDER — CHLORTHALIDONE 25 MG/1
TABLET ORAL
Qty: 15 TABLET | Refills: 0 | Status: SHIPPED | OUTPATIENT
Start: 2018-04-10 | End: 2019-07-22

## 2018-04-10 ASSESSMENT — PAIN SCALES - GENERAL: PAINLEVEL: MILD PAIN (2)

## 2018-04-10 NOTE — MR AVS SNAPSHOT
After Visit Summary   4/10/2018    Charlene Morales    MRN: [id number]           Patient Information     Date Of Birth          1965        Visit Information        Provider Department      4/10/2018 3:20 PM Tania Vázquez APRN [facility]        Today's Diagnoses     Strain of right shoulder, initial encounter    -  1    PFD (pelvic floor dysfunction)        Major depressive disorder, single episode, moderate (H)        Hyperlipidemia LDL goal <130        Hypertension, benign essential, goal below 140/90          Care Instructions    Do the shoulder exercises,   Stop sleeping on your stomach and right shoulder     Massage the shoulder with  Asper cream     You can Kinesiology tape  KT PRO is good,   [employer]          Go to physical therapy next door for the pelvic floor dysfunction                Follow-ups after your visit        Additional Services     [facility]NE [facility] PT, HAND, AND CHIROPRACTIC REFERRAL       **This order will print in the [facility] Scheduling Office**    Physical Therapy, Hand Therapy and Chiropractic Care are available through:    *[facility]  *[facility]  *[facility]    Call one number to schedule at any of the above locations: [phone].    Your provider has referred you to: Physical Therapy at [facility] or [facility]    Indication/Reason for Referral: Women's Health (Please Complete Special Programs SmartList)  Onset of Illness: months ago.   Did have a hysterectomy 10 + years ago and had been feeling that the pelvic floor is falling  Therapy Orders: Evaluate and Treat  Special Programs: None and Women's Health: Pelvic Floor Weakness: pelvic floor  and  As indicated   Special Request: None    Wilfred Hardin      Additional Comments for the Therapist or Chiropractor:     Please be aware that coverage of these services is subject to the terms and limitations of your health insurance plan.  Call member services at your  health plan with any benefit or coverage questions.      Please bring the following to your appointment:    *Your personal calendar for scheduling future appointments  *Comfortable clothing                  Who to contact     Normal or non-critical lab and imaging results will be communicated to you by Ye[employer]art, letter or phone within 4 business days after the clinic has received the results. If you do not hear from us within 7 days, please contact the clinic through Chatterfly[employer] or phone. If you have a critical or abnormal lab result, we will notify you by phone as soon as possible.  Submit refill requests through [employer] or call your pharmacy and they will forward the refill request to us. Please allow 3 business days for your refill to be completed.          If you need to speak with a  for additional information , please call: [phone]           Additional Information About Your Visit        [employer] Information     [employer] gives you secure access to your electronic health record. If you see a primary care provider, you can also send messages to your care team and make appointments. If you have questions, please call your primary care clinic.  If you do not have a primary care provider, please call [phone] and they will assist you.        Care EveryWhere ID     This is your Care EveryWhere ID. This could be used by other organizations to access your [place] medical records  [id number]        Your Vitals Were     Pulse Temperature Respirations BMI (Body Mass Index)          88 97.8  F (36.6  C) (Oral) 18 31.43 kg/m2         Blood Pressure from Last 3 Encounters:   04/10/18 106/68   02/06/18 122/86   01/29/18 124/80    Weight from Last 3 Encounters:   04/10/18 191 lb 12.8 oz (87 kg)   02/06/18 190 lb (86.2 kg)   01/29/18 191 lb (86.6 kg)              We Performed the Following     Albumin Random Urine Quantitative with Creat Ratio     Basic metabolic panel  (Ca, Cl, CO2, Creat, Gluc, K, Na,  BUN)     [facility] PT, HAND, AND CHIROPRACTIC REFERRAL     Lipid panel reflex to direct LDL Fasting          Today's Medication Changes          These changes are accurate as of 4/10/18  4:09 PM.  If you have any questions, ask your nurse or doctor.               These medicines have changed or have updated prescriptions.        Dose/Directions    * chlorthalidone 25 MG tablet   Commonly known as:  HYGROTON   This may have changed:  Another medication with the same name was added. Make sure you understand how and when to take each.   Used for:  Hypertension, benign essential, goal below 140/90   Changed by:  Tania Vázquez APRN CNP        TAKE ONE-HALF TABLET BY MOUTH EVERY DAY (NEED APPOINTMENT FOR FURTHER REFILLS)   Quantity:  15 tablet   Refills:  0       * chlorthalidone 25 MG tablet   Commonly known as:  HYGROTON   This may have changed:  You were already taking a medication with the same name, and this prescription was added. Make sure you understand how and when to take each.   Used for:  Hypertension, benign essential, goal below 140/90   Changed by:  Tania Vázquez APRN CNP        TAKE ONE-HALF TABLET BY MOUTH DAILY   Quantity:  45 tablet   Refills:  3       PARoxetine 40 MG tablet   Commonly known as:  PAXIL   This may have changed:  See the new instructions.   Used for:  Major depressive disorder, single episode, moderate (H)   Changed by:  Tania Vázquez APRN CNP        Dose:  40 mg   Take 1 tablet (40 mg) by mouth daily   Quantity:  90 tablet   Refills:  2       * Notice:  This list has 2 medication(s) that are the same as other medications prescribed for you. Read the directions carefully, and ask your doctor or other care provider to review them with you.      Stop taking these medicines if you haven't already. Please contact your care team if you have questions.     hydrOXYzine 25 MG tablet   Commonly known as:  ATARAX   Stopped by:  Tania Vázquez APRN CNP            predniSONE 10 MG tablet   Commonly known as:  DELTASONE   Stopped by:  Tania Vázquez APRN CNP           predniSONE 20 MG tablet   Commonly known as:  DELTASONE   Stopped by:  Tania Vázquez APRN CNP                Where to get your medicines      These medications were sent to [facility] [address]  [address] [facility] [address]     Phone:  [phone]     atorvastatin 40 MG tablet    chlorthalidone 25 MG tablet    lisinopril 40 MG tablet    PARoxetine 40 MG tablet                Primary Care Provider Office Phone # Fax #    NADINE Cooper [phone] [address] [place] [address]        Equal Access to Services     LYSSA MARTINEZ : Hadii aad ku hadasho Soomaali, waaxda luqadaha, qaybta kaalmada adeegyada, alanis yuen. [facility] [phone].    ATENCIÓN: Si habla español, tiene a talley disposición servicios gratuitos de asistencia lingüística. [facility] [phone].    We comply with applicable federal civil rights laws and [place] laws. We do not discriminate on the basis of race, color, national origin, age, disability, sex, sexual orientation, or gender identity.            Thank you!     Thank you for choosing [facility]  for your care. Our goal is always to provide you with excellent care. Hearing back from our patients is one way we can continue to improve our services. Please take a few minutes to complete the written survey that you may receive in the mail after your visit with us. Thank you!             Your Updated Medication List - Protect others around you: Learn how to safely use, store and throw away your medicines at www.disposemymeds.org.          This list is accurate as of 4/10/18  4:09 PM.  Always use your most recent med list.                   Brand Name Dispense Instructions for use Diagnosis    atorvastatin 40 MG tablet    LIPITOR     90 tablet    Take 1 tablet (40 mg) by mouth daily    Hyperlipidemia LDL goal <130       * chlorthalidone 25 MG tablet    HYGROTON    15 tablet    TAKE ONE-HALF TABLET BY MOUTH EVERY DAY (NEED APPOINTMENT FOR FURTHER REFILLS)    Hypertension, benign essential, goal below 140/90       * chlorthalidone 25 MG tablet    HYGROTON    45 tablet    TAKE ONE-HALF TABLET BY MOUTH DAILY    Hypertension, benign essential, goal below 140/90       ibuprofen 200 MG tablet    ADVIL/MOTRIN    120    5 orally at onset of headache    Headache(784.0)       lisinopril 40 MG tablet    PRINIVIL/ZESTRIL    90 tablet    Take 1 tablet (40 mg) by mouth daily    Hypertension, benign essential, goal below 140/90       omeprazole 20 MG CR capsule    priLOSEC    180 capsule    TAKE TWO CAPSULES (40MG)  BY MOUTH EVERY DAY    Peptic ulcer       PARoxetine 40 MG tablet    PAXIL    90 tablet    Take 1 tablet (40 mg) by mouth daily    Major depressive disorder, single episode, moderate (H)       sucralfate 1 GM tablet    CARAFATE    120 tablet    TAKE ONE TABLET BY MOUTH FOUR TIMES A DAY    Abdominal pain, right upper quadrant, History of gastric ulcer       traZODone 100 MG tablet    DESYREL    60 tablet    TAKE ONE OR TWO TABLETS BY MOUTH AT BEDTIME    Primary insomnia       * Notice:  This list has 2 medication(s) that are the same as other medications prescribed for you. Read the directions carefully, and ask your doctor or other care provider to review them with you.

## 2018-04-10 NOTE — NURSING NOTE
"Chief Complaint   Patient presents with     Depression     Lipids     Hypertension     Shoulder Pain       Initial /68 (BP Location: Right arm, Patient Position: Chair, Cuff Size: Adult Large)  Pulse 88  Temp 97.8  F (36.6  C) (Oral)  Resp 18  Wt 191 lb 12.8 oz (87 kg)  BMI 31.43 kg/m2 Estimated body mass index is 31.43 kg/(m^2) as calculated from the following:    Height as of 2/6/18: 5' 5.5\" (1.664 m).    Weight as of this encounter: 191 lb 12.8 oz (87 kg).  Medication Reconciliation: complete     Meghan Britt CMA (AAMA)      "

## 2018-04-10 NOTE — LETTER
[facility]  [address] [address]  [phone]        April 17, 2019    Charlene Morales  [address] [address]              Dear Charlene Morales    This is to remind you that your fasting lab is due.    You may call our office at [phone] to schedule an appointment.    Please disregard this notice if you have already had your labs drawn or made an appointment.        Sincerely,        Tania Vázquez RN, CNP

## 2018-04-10 NOTE — PROGRESS NOTES
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     *Spinal Concern - Recently noticed that there is a lump on her spine near the top of her shoulders, does feel that the area is \"tired\". Spoke with a friend who states that she has noticed it for at least a year now.     Shoulder Pain    Onset: 4-5 years, intermittent    Description:   Location: Right shoulder  Character: intermittent intense pain   When getting up in the AM and toward the end of the day .  Taking IBU will help     Intensity: moderate    Progression of Symptoms: intermittent    Accompanying Signs & Symptoms:  Other symptoms: some numbness and tingling in the hands, decrease in strength, decreased ROM    History:   Previous similar pain: YES- ongoing pain      Precipitating factors:   Trauma or overuse: YES- a long time ago did have a shoulder \"pop out of its socket\" when portaging a canoe, also most comfortable way to sleep is with the right arm straight up, has slept like this for a very long time.     Alleviating factors:  Improved by: Ibuprofen    Therapies Tried and outcome: Ibuprofen - does help to alleviate the pain     Hyperlipidemia Follow-Up      Rate your low fat/cholesterol diet?: good    Taking statin?  Yes, no muscle aches from statin    Other lipid medications/supplements?:  none    Hypertension Follow-up      Outpatient blood pressures are not being checked.    Low Salt Diet: low salt    Depression Followup    Status since last visit: Stable    See PHQ-9 for current symptoms.  Other associated symptoms: some situational symptoms    Complicating factors:   Significant life event:  Yes-  Daughter is dealing with some issues recently   Current substance abuse:  None  Anxiety or Panic symptoms:  Yes-  Some situational related to daughter    PHQ-9 5/9/2016 3/10/2017 " 1/29/2018   Total Score 3 1 4   Q9: Suicide Ideation Not at all Not at all Not at all     In the past two weeks have you had thoughts of suicide or self-harm?  No.    Do you have concerns about your personal safety or the safety of others?   No  PHQ-9  English  PHQ-9   Any Language  Suicide Assessment Five-step Evaluation and Treatment (SAFE-T)    Amount of exercise or physical activity: 4-5 days/week for an average of 30-45 minutes    Problems taking medications regularly: No    Medication side effects: none    Diet: regular (no restrictions)        Sleeps on her side and puts her head on her shoulder     Problem list and histories reviewed & adjusted, as indicated.  Additional history: as documented    Patient Active Problem List   Diagnosis     Hypertension, benign essential, goal below 140/90     Peptic ulcer     Headache     BARBITUATE  DEPENDENCE      IRRITABLE BLADDER     CYSTIC ACNE     Weight gain     Health Care Home     HC     Hyperlipidemia LDL goal <130     History of  addiction  in treatment 7 year ago      Major depressive disorder, recurrent episode, moderate (H)     Primary insomnia     Past Surgical History:   Procedure Laterality Date     HC COLONOSCOPY THRU STOMA, DIAGNOSTIC  5/2/03    Normal. Repeat at age 52     HYSTERECTOMY, PAP NO LONGER INDICATED  2004    Still has ovaries and      SURGICAL HISTORY OF -   8/23/2004    Hemorrhoidectomy & Partial Lateral Sphincterotomy     SURGICAL HISTORY OF -   5/2/2003    Hemorrhoid Banding x2       Social History   Substance Use Topics     Smoking status: Never Smoker     Smokeless tobacco: Never Used     Alcohol use Yes      Comment: 1-2 drinks a week at most- wine      Family History   Problem Relation Age of Onset     Arthritis Mother      Hypertension Mother      HEART DISEASE Father      DIABETES Father      Lipids Father      Alcohol/Drug Father      C.A.D. Father      open heart surgery at age 50     Hypertension Maternal Grandfather      Alzheimer  Disease Maternal Grandfather      Colon Cancer Maternal Grandfather      age 65     HEART DISEASE Paternal Grandfather      Thyroid Disease Sister      Hypertension Sister      Hypertension Sister      CEREBROVASCULAR DISEASE No family hx of      Breast Cancer No family hx of          Current Outpatient Prescriptions   Medication Sig Dispense Refill     chlorthalidone (HYGROTON) 25 MG tablet TAKE ONE-HALF TABLET BY MOUTH EVERY DAY (NEED APPOINTMENT FOR FURTHER REFILLS) 15 tablet 0     omeprazole (PRILOSEC) 20 MG CR capsule TAKE TWO CAPSULES (40MG)  BY MOUTH EVERY  capsule 1     traZODone (DESYREL) 100 MG tablet TAKE ONE OR TWO TABLETS BY MOUTH AT BEDTIME 60 tablet 0     PARoxetine (PAXIL) 40 MG tablet TAKE ONE TABLET BY MOUTH EVERY DAY 90 tablet 2     atorvastatin (LIPITOR) 40 MG tablet Take 1 tablet (40 mg) by mouth daily 90 tablet 3     lisinopril (PRINIVIL/ZESTRIL) 40 MG tablet Take 1 tablet (40 mg) by mouth daily 90 tablet 3     IBUPROFEN 200 MG OR TABS 5 orally at onset of headache 120 prn     sucralfate (CARAFATE) 1 GM tablet TAKE ONE TABLET BY MOUTH FOUR TIMES A DAY (Patient not taking: Reported on 4/10/2018) 120 tablet 0     Allergies   Allergen Reactions     Sulfa Drugs      BP Readings from Last 3 Encounters:   04/10/18 106/68   02/06/18 122/86   01/29/18 124/80    Wt Readings from Last 3 Encounters:   04/10/18 191 lb 12.8 oz (87 kg)   02/06/18 190 lb (86.2 kg)   01/29/18 191 lb (86.6 kg)                    Reviewed and updated as needed this visit by clinical staff       Reviewed and updated as needed this visit by Provider         ROS:  CONSTITUTIONAL: NEGATIVE for fever, chills, change in weight  ENT/MOUTH: NEGATIVE for ear, mouth and throat problems  RESP: NEGATIVE for significant cough or SOB  CV: NEGATIVE for chest pain, palpitations or peripheral edema   :   MUSCULOSKELETAL: POSITIVE  for joint pain right shoulder pain,  Does sleep with her  Right arm over her head and on her stomach.    Will wake with numbness in her right   PSYCHIATRIC: POSITIVE for stress related to her daughter who had OCD,  Is doing better, is feeling like her medication is working well     OBJECTIVE:     /68 (BP Location: Right arm, Patient Position: Chair, Cuff Size: Adult Large)  Pulse 88  Temp 97.8  F (36.6  C) (Oral)  Resp 18  Wt 191 lb 12.8 oz (87 kg)  BMI 31.43 kg/m2  Body mass index is 31.43 kg/(m^2).   GENERAL: healthy, alert and no distress  RESP: lungs clear to auscultation - no rales, rhonchno swelling, bruising, discoloration, or obvious deformity or asymmetryi or wheezes  CV: regular rate and rhythm, normal S1 S2, no S3 or S4, no murmur, click or rub, no peripheral edema and peripheral pulses strong   (female): deferred, reviewed pelvic dysfunction   MS: right shoulder  Inspection:   Tender: anterior capsule and proximal bicep tendon  Non-tender: proximal-mid clavicle, mid-distal clavicle, greater tuberosity, proximal humerus, supraspinatus  and infraspinatus  Range of Motion  Active:forward flexion 180 degrees   pain at  130 degrees, abduction  130 degrees, external rotation  normal, internal rotation  degrees full pain at 90 degrees   Passive: forward flexion normal, abduction  normal  Strength: rotator cuff strength full  Special tests:      Diagnostic Test Results:  Results for orders placed or performed in visit on 03/10/17   Lipid Profile with reflex to direct LDL   Result Value Ref Range    Cholesterol 265 (H) <200 mg/dL    Triglycerides 111 <150 mg/dL    HDL Cholesterol 72 >49 mg/dL    LDL Cholesterol Calculated 171 (H) <100 mg/dL    Non HDL Cholesterol 193 (H) <130 mg/dL   Albumin Random Urine Quantitative   Result Value Ref Range    Creatinine Urine 101 mg/dL    Albumin Urine mg/L 11 mg/L    Albumin Urine mg/g Cr 10.99 0 - 25 mg/g Cr   Basic metabolic panel  (Ca, Cl, CO2, Creat, Gluc, K, Na, BUN)   Result Value Ref Range    Sodium 146 (H) 133 - 144 mmol/L    Potassium 3.8 3.4 - 5.3 mmol/L     Chloride 110 (H) 94 - 109 mmol/L    Carbon Dioxide 27 20 - 32 mmol/L    Anion Gap 9 3 - 14 mmol/L    Glucose 93 70 - 99 mg/dL    Urea Nitrogen 27 7 - 30 mg/dL    Creatinine 0.92 0.52 - 1.04 mg/dL    GFR Estimate 65 >60 mL/min/1.7m2    GFR Estimate If Black 78 >60 mL/min/1.7m2    Calcium 9.0 8.5 - 10.1 mg/dL       ASSESSMENT/PLAN:     ASSESSMENT/PLAN:      ICD-10-CM    1. Strain of right shoulder, initial encounter S46.911A    2. PFD (pelvic floor dysfunction) M62.89 [facility]   3. Major depressive disorder, single episode, moderate (H) F32.1 PARoxetine (PAXIL) 40 MG tablet   4. Hyperlipidemia LDL goal <130 E78.5 atorvastatin (LIPITOR) 40 MG tablet     Lipid panel reflex to direct LDL Fasting     CANCELED: Lipid panel reflex to direct LDL Fasting   5. Hypertension, benign essential, goal below 140/90 I10 lisinopril (PRINIVIL/ZESTRIL) 40 MG tablet     chlorthalidone (HYGROTON) 25 MG tablet     Albumin Random Urine Quantitative with Creat Ratio     Basic metabolic panel  (Ca, Cl, CO2, Creat, Gluc, K, Na, BUN)     CANCELED: Basic metabolic panel  (Ca, Cl, CO2, Creat, Gluc, K, Na, BUN)     CANCELED: Albumin Random Urine Quantitative with Creat Ratio       Patient Instructions   Do the shoulder exercises,   Stop sleeping on your stomach and right shoulder     Massage the shoulder with  Asper cream     You can Kinesiology tape  KT PRO is good,   [employer]          Go to physical therapy next door for the pelvic floor dysfunction                       See Patient Instructions    YASMANY ROMAN NP, APRN CNP  [facility]

## 2018-04-10 NOTE — PATIENT INSTRUCTIONS
Do the shoulder exercises,   Stop sleeping on your stomach and right shoulder     Massage the shoulder with  Asper cream     You can Kinesiology tape  KT PRO is good,   [employer]          Go to physical therapy next door for the pelvic floor dysfunction

## 2018-04-11 RX ORDER — TRAZODONE HYDROCHLORIDE 100 MG/1
TABLET ORAL
Qty: 60 TABLET | Refills: 0 | Status: SHIPPED | OUTPATIENT
Start: 2018-04-11 | End: 2018-05-18

## 2018-04-11 RX ORDER — ATORVASTATIN CALCIUM 40 MG/1
TABLET, FILM COATED ORAL
Qty: 90 TABLET | Refills: 0 | OUTPATIENT
Start: 2018-04-11

## 2018-04-11 RX ORDER — LISINOPRIL 40 MG/1
TABLET ORAL
Qty: 90 TABLET | Refills: 3 | OUTPATIENT
Start: 2018-04-11

## 2018-04-11 NOTE — TELEPHONE ENCOUNTER
Prescription approved per [facility] Refill Protocol or patient Primary care provider (PCP)  LAUREN Holliday RN/Marc Briscoe

## 2018-04-26 DIAGNOSIS — R10.11 ABDOMINAL PAIN, RIGHT UPPER QUADRANT: ICD-10-CM

## 2018-04-26 DIAGNOSIS — Z87.11 HISTORY OF GASTRIC ULCER: ICD-10-CM

## 2018-04-26 NOTE — TELEPHONE ENCOUNTER
"Requested Prescriptions   Pending Prescriptions Disp Refills     sucralfate (CARAFATE) 1 GM tablet [Pharmacy Med Name: SUCRALFATE 1GM TABS] 120 tablet 0    Last Written Prescription Date:  11/16/2017 #120 x 0  Last filled 11/17/2017  Last office visit: 4/10/2018 AISHA Bautista   Future Office Visit:  None   Sig: TAKE ONE TABLET BY MOUTH FOUR TIMES A DAY    Miscellaneous Gastrointestinal Agents Passed    4/26/2018 11:35 AM       Passed - Recent (12 mo) or future (30 days) visit within the authorizing provider's specialty    Patient had office visit in the last 12 months or has a visit in the next 30 days with authorizing provider or within the authorizing provider's specialty.  See \"Patient Info\" tab in inbasket, or \"Choose Columns\" in Meds & Orders section of the refill encounter.           Passed - Patient is 18 years of age or older          "

## 2018-04-27 RX ORDER — SUCRALFATE 1 G/1
TABLET ORAL
Qty: 360 TABLET | Refills: 2 | Status: SHIPPED | OUTPATIENT
Start: 2018-04-27 | End: 2021-05-03

## 2018-04-27 NOTE — TELEPHONE ENCOUNTER
Prescription approved per [facility] Refill Protocol.  Sonya Hopkins RN

## 2018-04-30 ENCOUNTER — MYC MEDICAL ADVICE (OUTPATIENT)
Dept: FAMILY MEDICINE | Facility: CLINIC | Age: 53
End: 2018-04-30

## 2018-04-30 ENCOUNTER — TELEPHONE (OUTPATIENT)
Dept: PALLIATIVE MEDICINE | Facility: CLINIC | Age: 53
End: 2018-04-30

## 2018-04-30 DIAGNOSIS — M25.511 ACUTE PAIN OF RIGHT SHOULDER: Primary | ICD-10-CM

## 2018-04-30 NOTE — TELEPHONE ENCOUNTER
Pre-screening Questions for Shoulder/Knee Injections:      Does patient have an active infection or treated for one within the past week? No  (If YES, do NOT schedule and route to RN)     Is patient currently taking any antibiotics?  No  (If YES, do NOT schedule and route to RN)  For patients on chronic, preventative or prophylactic antibiotics, procedures can be scheduled.  For patients on antibiotics for active or recent infection:  Benito Rankin - antibiotic course must have been completed for 4 days  Benito Pickering- antibiotic course must have been completed for 7 days    Is patient taking any aspirin products? No   No need to hold non-aspirin anticoagulants.   If taking > 325mg/day of aspirin, limit aspirin to 81-325mg/day x 1 week. No hold required day of procedure.

## 2018-05-03 ENCOUNTER — RADIOLOGY INJECTION OFFICE VISIT (OUTPATIENT)
Dept: PALLIATIVE MEDICINE | Facility: CLINIC | Age: 53
End: 2018-05-03
Payer: COMMERCIAL

## 2018-05-03 ENCOUNTER — HOSPITAL ENCOUNTER (OUTPATIENT)
Dept: RADIOLOGY | Facility: CLINIC | Age: 53
Discharge: HOME | End: 2018-05-03
Attending: ANESTHESIOLOGY | Admitting: ANESTHESIOLOGY
Payer: COMMERCIAL

## 2018-05-03 VITALS — SYSTOLIC BLOOD PRESSURE: 146 MMHG | RESPIRATION RATE: 16 BRPM | HEART RATE: 82 BPM | DIASTOLIC BLOOD PRESSURE: 86 MMHG

## 2018-05-03 DIAGNOSIS — M25.511 CHRONIC RIGHT SHOULDER PAIN: Primary | ICD-10-CM

## 2018-05-03 DIAGNOSIS — M25.511 SHOULDER PAIN, RIGHT: ICD-10-CM

## 2018-05-03 DIAGNOSIS — G89.29 CHRONIC RIGHT SHOULDER PAIN: Primary | ICD-10-CM

## 2018-05-03 DIAGNOSIS — M19.011 PRIMARY OSTEOARTHRITIS OF RIGHT SHOULDER: ICD-10-CM

## 2018-05-03 PROCEDURE — 20610 DRAIN/INJ JOINT/BURSA W/O US: CPT | Mod: RT | Performed by: ANESTHESIOLOGY

## 2018-05-03 PROCEDURE — 77002 NEEDLE LOCALIZATION BY XRAY: CPT | Mod: TC

## 2018-05-03 PROCEDURE — 25000128 H RX IP 250 OP 636: Performed by: ANESTHESIOLOGY

## 2018-05-03 PROCEDURE — 25000125 ZZHC RX 250: Performed by: ANESTHESIOLOGY

## 2018-05-03 RX ORDER — TRIAMCINOLONE ACETONIDE 40 MG/ML
40 INJECTION, SUSPENSION INTRA-ARTICULAR; INTRAMUSCULAR ONCE
Status: COMPLETED | OUTPATIENT
Start: 2018-05-03 | End: 2018-05-03

## 2018-05-03 RX ORDER — IOPAMIDOL 612 MG/ML
3 INJECTION, SOLUTION INTRATHECAL ONCE
Status: COMPLETED | OUTPATIENT
Start: 2018-05-03 | End: 2018-05-03

## 2018-05-03 RX ORDER — LIDOCAINE HYDROCHLORIDE 10 MG/ML
5 INJECTION, SOLUTION EPIDURAL; INFILTRATION; INTRACAUDAL; PERINEURAL ONCE
Status: COMPLETED | OUTPATIENT
Start: 2018-05-03 | End: 2018-05-03

## 2018-05-03 RX ADMIN — TRIAMCINOLONE ACETONIDE 40 MG: 40 INJECTION, SUSPENSION INTRA-ARTICULAR; INTRAMUSCULAR at 11:38

## 2018-05-03 RX ADMIN — LIDOCAINE HYDROCHLORIDE 2 ML: 10 INJECTION, SOLUTION EPIDURAL; INFILTRATION; INTRACAUDAL; PERINEURAL at 11:39

## 2018-05-03 RX ADMIN — IOPAMIDOL 1.5 ML: 612 INJECTION, SOLUTION INTRATHECAL at 11:40

## 2018-05-03 RX ADMIN — ROPIVACAINE HYDROCHLORIDE 2 ML: 2 INJECTION, SOLUTION EPIDURAL; INFILTRATION at 11:38

## 2018-05-03 NOTE — PROGRESS NOTES
Pre-procedure Intake    Have you been fasting? NA    If yes, for how long?     Are you taking a prescribed blood thinner such as coumadin, Plavix, Xarelto?    No    If yes, when did you take your last dose?     Do you take aspirin?  Yes -   Other blood thinners Excerdrine    If cervical procedure, have you held aspirin for 6 days?   NA    Do you have any allergies to contrast dye, iodine, steroid and/or numbing medications?  NO    Are you currently taking antibiotics or have an active infection?  YES: Minnecycline for derm    Have you had a fever/elevated temperature within the past week? NO    Are you currently taking oral steroids? NO    Do you have a ? Yes       Are you pregnant or breastfeeding?  NO    Are the vital signs normal?  Yes

## 2018-05-03 NOTE — PROGRESS NOTES
Pre procedure Diagnosis: chronic right shoulder pain, shoulder osteoarthritis   Post procedure Diagnosis: Same  Procedure performed: right shoulder injection, fluoroscopically guided, contrast controlled  Anesthesia: local  Complications: none  Operators: Bimal Flynn MD     Indications:   Charlene Morales is a 53 year old female was sent by NADINE Galdamez for a right shoulder injection.  They have a history of chronic right shoulder pain deep in the shoulder without any decrease in range of motion or radiation.  Exam shows tenderness to palpation at the posterior aspect of the shoulder, full right shoulder range of motion with negative Apley Scratch testing and they have tried conservative treatment including activity modifications, medications.    Options/alternatives, benefits and risks were discussed with the patient including bleeding, infection, nerve injury, reaction to medications, lack of expected response.  Questions were answered to her satisfaction and she agrees to proceed. Voluntary informed consent was obtained and signed.     Vitals were reviewed: Yes  133/82  91 15  Allergies were reviewed:  Yes   Medications were reviewed:  Yes   Pre-procedure pain score: 2/10    Procedure:  After getting informed consent, patient was brought into the procedure suite and was placed in a supine position on the procedure table.   A Pause for the Cause was performed.  Patient was prepped and draped in sterile fashion.     Then, local skin and subcutaneous tissue anesthesia was obtained by injection Lidocaine 1% 2 ml.  A 27 gauge 3.5 inch spinal needle was advanced into the right shoulder joint utilizing intermittent fluoroscopy.  Aspiration was negative. Needle position was verified by injecting Isovue 300 1.5 ml utilizing real time fluoroscopy that showed good needle location and spread within the joint capsule without vascular uptake.  Then, 3 ml of a combination of Kenalog 40 mg and Ropivicaine 0.2% 2 ml was  injected into the joint without resistance and the needle was flushed with Lidocaine as it was withdrawn.    Hemostasis was achieved, the area was cleaned, and bandaids were placed when appropriate.  The patient tolerated the procedure well, and was taken to the recovery room.    Images were saved to PACS.    Post-procedure pain score: 1/10  Follow-up includes:   -f/u phone call in one week  -f/u with referring provider    Bimal Flynn MD  [facility]

## 2018-05-18 DIAGNOSIS — F51.01 PRIMARY INSOMNIA: ICD-10-CM

## 2018-05-18 RX ORDER — TRAZODONE HYDROCHLORIDE 100 MG/1
TABLET ORAL
Qty: 60 TABLET | Refills: 1 | Status: SHIPPED | OUTPATIENT
Start: 2018-05-18 | End: 2018-07-23

## 2018-06-30 ENCOUNTER — HEALTH MAINTENANCE LETTER (OUTPATIENT)
Age: 53
End: 2018-06-30

## 2018-07-12 ENCOUNTER — TELEPHONE (OUTPATIENT)
Dept: FAMILY MEDICINE | Facility: CLINIC | Age: 53
End: 2018-07-12

## 2018-07-12 NOTE — TELEPHONE ENCOUNTER
Panel Management Review      Patient has the following on her problem list:     Depression / Dysthymia review    Measure:  Needs PHQ-9 score of 4 or less during index window.  Administer PHQ-9 and if score is 5 or more, send encounter to provider for next steps.    5 - 7 month window range: none    PHQ-9 SCORE 5/9/2016 3/10/2017 1/29/2018   Total Score - - -   Total Score 3 1 4       If PHQ-9 recheck is 5 or more, route to provider for next steps.    Patient is due for:  PHQ9    Hypertension   Last three blood pressure readings:  BP Readings from Last 3 Encounters:   05/03/18 146/86   04/10/18 106/68   02/06/18 122/86     Blood pressure: FAILED    HTN Guidelines:  Age 18-59 BP range:  Less than 140/90  Age 60-85 with Diabetes:  Less than 140/90  Age 60-85 without Diabetes:  less than 150/90      Composite cancer screening  Chart review shows that this patient is due/due soon for the following Mammogram and Colonoscopy  Summary:    Patient is due/failing the following:   Mammo, colon screen, tetanus, PHQ9, BMP, microalbumin, lipid    Action needed:   Referral for mammo and colon screen  Will do labs at next office visit.   Tetanus update reminder  Send PHQ9 through [facility]    Type of outreach:    Sent [facility] message.    Questions for provider review:    None                                                                                                                                    Birdie LIU       Chart routed to none .

## 2018-07-23 DIAGNOSIS — F51.01 PRIMARY INSOMNIA: ICD-10-CM

## 2018-07-23 NOTE — TELEPHONE ENCOUNTER
"Requested Prescriptions   Pending Prescriptions Disp Refills     traZODone (DESYREL) 100 MG tablet [Pharmacy Med Name: TRAZODONE HCL 100MG TABS] 60 tablet 1    Last Written Prescription Date:  05/18/2018 #60 x 1  Last filled 06/18/2018  Last office visit: 4/10/2018 AISHA Vázquez   Future Office Visit:  None   Sig: TAKE ONE OR TWO TABLETS BY MOUTH EVERY NIGHT AT BEDTIME    Serotonin Modulators Passed    7/23/2018 11:14 AM       Passed - Recent (12 mo) or future (30 days) visit within the authorizing provider's specialty    Patient had office visit in the last 12 months or has a visit in the next 30 days with authorizing provider or within the authorizing provider's specialty.  See \"Patient Info\" tab in inbasket, or \"Choose Columns\" in Meds & Orders section of the refill encounter.           Passed - Patient is age 18 or older       Passed - No active pregnancy on record       Passed - No positive pregnancy test in past 12 months          "

## 2018-07-24 RX ORDER — TRAZODONE HYDROCHLORIDE 100 MG/1
TABLET ORAL
Qty: 60 TABLET | Refills: 8 | Status: SHIPPED | OUTPATIENT
Start: 2018-07-24 | End: 2019-04-19

## 2018-07-31 DIAGNOSIS — L70.8 OTHER ACNE: ICD-10-CM

## 2018-07-31 NOTE — TELEPHONE ENCOUNTER
"Requested Prescriptions   Pending Prescriptions Disp Refills     minocycline (MINOCIN/DYNACIN) 100 MG capsule [Pharmacy Med Name: MINOCYCLINE HCL 100MG CAPS] 180 capsule 0    Last Written Prescription Date:  03/30/2017 #180 x 1  Last filled 01/03/2018  Last office visit: 4/10/2018 AISHA Vázquez   Future Office Visit: None    Note: Medication is not on the current med list.   Sig: TAKE ONE TO TWO CAPSULES BY MOUTH EVERY DAY FOR ACNE    Oral Acne/Rosacea Medications Protocol Failed    7/31/2018  9:10 AM       Failed - Confirmation of diagnosis is required    Please confirm diagnosis is acne or rosacea.     If NOT acne or rosacea; refer request to provider for further evaluation.    If diagnosis IS acne or rosacea, OK to refill BASED ON PREVIOUS REFILL CLINICAL NOTE RECOMMENDATION.         Passed - Patient is 12 years of age or older       Passed - Recent (12 mo) or future (30 days) visit within the authorizing provider's specialty    Patient had office visit in the last 12 months or has a visit in the next 30 days with authorizing provider or within the authorizing provider's specialty.  See \"Patient Info\" tab in inbasket, or \"Choose Columns\" in Meds & Orders section of the refill encounter.           Passed - No active pregnancy on record       Passed - No positive prenancy test is past 12 months          "

## 2018-08-03 RX ORDER — MINOCYCLINE HYDROCHLORIDE 100 MG/1
CAPSULE ORAL
Qty: 180 CAPSULE | Refills: 0 | OUTPATIENT
Start: 2018-08-03

## 2018-08-20 ENCOUNTER — MYC MEDICAL ADVICE (OUTPATIENT)
Dept: FAMILY MEDICINE | Facility: CLINIC | Age: 53
End: 2018-08-20

## 2018-08-20 DIAGNOSIS — M25.511 CHRONIC RIGHT SHOULDER PAIN: Primary | ICD-10-CM

## 2018-08-20 DIAGNOSIS — G89.29 CHRONIC RIGHT SHOULDER PAIN: Primary | ICD-10-CM

## 2018-08-31 ENCOUNTER — HOSPITAL ENCOUNTER (OUTPATIENT)
Dept: MRI IMAGING | Facility: CLINIC | Age: 53
Discharge: HOME | End: 2018-08-31
Attending: NURSE PRACTITIONER | Admitting: NURSE PRACTITIONER
Payer: COMMERCIAL

## 2018-08-31 DIAGNOSIS — M25.511 CHRONIC RIGHT SHOULDER PAIN: ICD-10-CM

## 2018-08-31 DIAGNOSIS — G89.29 CHRONIC RIGHT SHOULDER PAIN: ICD-10-CM

## 2018-08-31 PROCEDURE — 73221 MRI JOINT UPR EXTREM W/O DYE: CPT | Mod: RT

## 2018-09-02 DIAGNOSIS — R93.7 BONE MARROW EDEMA: ICD-10-CM

## 2018-09-02 DIAGNOSIS — M19.011 ARTHROSIS OF RIGHT SHOULDER: Primary | ICD-10-CM

## 2018-10-04 ENCOUNTER — THERAPY VISIT (OUTPATIENT)
Dept: PHYSICAL THERAPY | Facility: CLINIC | Age: 53
End: 2018-10-04
Payer: COMMERCIAL

## 2018-10-04 DIAGNOSIS — N81.89 PELVIC FLOOR WEAKNESS IN FEMALE: Primary | ICD-10-CM

## 2018-10-04 DIAGNOSIS — M62.89 PELVIC FLOOR DYSFUNCTION: ICD-10-CM

## 2018-10-04 PROCEDURE — 97161 PT EVAL LOW COMPLEX 20 MIN: CPT | Mod: GP | Performed by: PHYSICAL THERAPIST

## 2018-10-04 PROCEDURE — 97530 THERAPEUTIC ACTIVITIES: CPT | Mod: GP | Performed by: PHYSICAL THERAPIST

## 2018-10-04 PROCEDURE — 97112 NEUROMUSCULAR REEDUCATION: CPT | Mod: GP | Performed by: PHYSICAL THERAPIST

## 2018-10-04 NOTE — PROGRESS NOTES
[facility] Initial Evaluation  Subjective:  Patient is a 53 year old female presenting with rehab pelvic hpi. The history is provided by the patient. No  was used.                                    General health as reported by patient is good.  Pertinent medical history includes:  Overweight, high blood pressure and menopausal.  Medical allergies: none.    Current medications:  High blood pressure medication and sleep medication.  Current occupation is Teacher.  Patient is working in normal job without restrictions.      Barriers include:  None as reported by the patient.    Red flags:  None as reported by the patient.                    SUBJECTIVE:  Patient reports onset of symptoms a couple years ago. Started noticing for the first time she felt her vaginal area was descending and coming down. Had some episodes on and off of this and then finally saw MD this spring. Symptoms include feeling like things are loose and coming down a little bit with sitting in a random position or just randomly. Hasn't noticed anything coming through the vaginal canal. Does also note that sometimes when she has a bowel movement, she feels it's in the wrong place like it's moving towards the vaginal area and has to splint on the posterior vaginal wall. Denies any pain.  Since onset symptoms have been getting better, worse or staying the same? Staying the same.      Verbal permission from patient to do internal pelvis muscle assessment?Yes Verbal permission to complete external perineal assessment? Yes Would you like someone else in the room as a chaperone? No      Urination:  Do you leak on the way to the bathroom or with a strong urge to void? No   Do you leak with cough,sneeze, jumping, running? Last year when she was really sick and coughing a lot had to wear a pad for about a week. Does note that on occasion with jumping and sneezing, will get a little leakage.   Any other activities that  cause leaking? No   Do you have triggers that make you feel you can't wait to go to the bathroom? No what are they NA.  Type of pad and number used per day? 0  When you leak what is the amount? Small  How long can you delay the need to urinate? 1-2 hours.   How many times do you get up to urinate at night? 1   Can you stop the flow of urine when on the toilet? Yes  Is the volume of urine passed usually: medium. (8sec rule= 250ml with average bladder storing 400-600ml)  Do you feel empty when you are done? Yes but didn't always and if she doesn't feel she's empty, will lean forward and push a little to get it out.  Do you strain to pass urine? No  Do you have a slow or hesitant urinary stream? No  Do you have difficulty initiating the urine stream? No  Is urination painful? No  How many bladder infections have you had in last 12 months? 0  Fluid intake(one glass is 8oz or one cup) 8 glasses/day, 0caffinated glasses/day  1-2 wine/week alcohol glasses/day.    Bowel habits:  Frequency of bowel movements? 1 times a week  Consistancy of stool? soft formed, [place] Stool Scale NA  Do you ignore the urge to defecate? No  Do you strain to pass stool? sometimes    Aggrevating factors:  Is loss of stool associated with an activity (lifting, coughing, running) or a food)  If her stool is real loose and she's sleeping and just waking up, thinking she's had to pass gas, she will pass stool. No leaking stool with coughing or sneezing.  Are there any foods that increase or decrease your symptoms?  No  Do you have any food allergies?  No      Sensation:   Can you tell if there is solid, liquid, or gas in the rectum?  Yes  Do you feel the urge to move your bowels?  Yes  Is the urge very strong and difficult to control? NA Or weak/absent? NA  Do you feel the rectum is empty with you finish a bowel movement?   No 1/2 the time. Had terrible hemorrhoids after her births so is very cautious to sit and wait or push.    Do you have  abdominal pain?  No  Describe the quality of the pain: NA  What makes your abdominal pain worse? NA  What makes your abdominal pain better? NA  Do you ever have pain that wakes you at night? NA    Do you have rectal pain, pressure, or burning?  No     Pelvic Pain:  Do you have any pelvic pain with intercourse, exams, use of tampons? No  Is initial penetration during intercourse painful? Yes  Is deeper penetration painful? No  Do you use lubricant? No What kind? NA  ?  Given birth? Yes Any complications? Yes, 1st preclampsia and was induced and push for 2 hrs 45 min, then did vacuum. Hemorrhoids from that baby were the size of plums. 2nd baby was on bedrest for 4 months and pushed for 1 hour and BP shot up and did the vacuum again.   # of vaginal delieveries? 2  # of C-sections? 0  # of episiotomies? 2.  Are you sexually active?Yes  Have you ever been worried for your physical safety? No    Do you have any depression, anxiety, panic attacks, excessive stress?  No  Any abdominal or pelvic surgeries? Hysterectomy (15 years ago-d/t to terrible periods), hemorrhoid surgery  Are you having any regular exercise?Yes, walk 1 mile/day  Have you practiced the PF(kegel) exercises for 4 or more weeks? No  Thyroid checked? Yes, normal (related to hair loss, flu-like symptoms, wt gain/loss, fatigue, menopause)  Changed diet lately?Eating healthier and more regularly.    OBSERVATION  Lumbar Posture: Negative  Pelvic symmetry: Negative  Introitus: loose, scar tissue-episiotomy and hemorrhoids  Trendelenberg Sign:Not Tested: Not indicated     ROM  Single leg standing unilateral hip flexion PSIS:Negative  Standing forward flexion PSIS:Negative  Passive Hip ROM:Negative  BRAVO:Negative  BRAVO with OP:Negative      MUSCLE PERFORMANCE  Active SLR:Not Tested: Not indicated   Abdominal Core 90/90 hip lower:Not Tested: Not indicated   Baseline PF tone:overall normal but did have a couple trigger points in levator ani.  PF Tone with cough:  bulge  Valsalva: bulge  PF Response quality: sluggish  PF Power: Center: 3 Stronger on right/left symmetrical.  Endurance: Maximum contraction in seconds: 3  # of endurance contractions before fatigue: 2  Quick contraction repetitions prior to fatigue: 7.  Specificity/accessory muscles: WNL/WFL, Synergy with abdominals: Not Tested.  Prolapse: Cyctocele/Rectocele/Uterine stgstrstastdstest:st st1st Urethrocele: none, Enterocele: none.    PALPATION: Pain: No pain noted but did have tension in levator ani and coccygeus.  Objective:  System                                 Pelvic Dysfunction Evaluation:        Flexibility:    Tightness present at:Adductors; Iliopsoas and Piriformis    Abdominal Wall:  Abdominal wall pelvic: tightness noted in L lower quadrant and in rectus insertion into pubic bone.    Trigger Points:  Transverse abdominals    Pelvic Clock Exam:    Ischiocavernosis pain:  -  Bulbocavernosis pain:  -  Transverse Perineal:  -  Levator ANI:  -  Perineal Body:  -  SI Provocation:      Negative for:  SI Compression and ASLR        External Assessment:    Skin Condition:  Hemorrhoids  Scars:  Well healed and non-tender  Bearing Down/Coughing:  Rectocele and cystocele  Tissue Symmetry:  Normal  Introitus:  Normal  Muscle Contraction/Perineal Mobility:  Elevation and urogential triangle descent  Internal Assessment:      Contraction/Grade:  Fair squeeze, definite lift (3)                               General     ROS    Assessment/Plan:    Patient is a 53 year old female with pelvic complaints.    Patient has the following significant findings with corresponding treatment plan.                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  Decreased ROM/flexibility - manual therapy, therapeutic exercise, therapeutic activity and home program  Decreased strength - therapeutic exercise, therapeutic activities and home program  Impaired muscle performance - neuro re-education and home  program  Decreased function - therapeutic activities and home program  Impaired posture - neuro re-education, therapeutic activities and home program    Therapy Evaluation Codes:   1) History comprised of:   Personal factors that impact the plan of care:      Time since onset of symptoms.    Comorbidity factors that impact the plan of care are:      High blood pressure, Menopausal and Overweight.     Medications impacting care: High blood pressure and Sleep.  2) Examination of Body Systems comprised of:   Body structures and functions that impact the plan of care:      Pelvis.   Activity limitations that impact the plan of care are:      Lifting and Squatting/kneeling.  3) Clinical presentation characteristics are:   Stable/Uncomplicated.  4) Decision-Making    Low complexity using standardized patient assessment instrument and/or measureable assessment of functional outcome.  Cumulative Therapy Evaluation is: Low complexity.    Previous and current functional limitations:  (See Goal Flow Sheet for this information)    Short term and Long term goals: (See Goal Flow Sheet for this information)     Communication ability:  Patient appears to be able to clearly communicate and understand verbal and written communication and follow directions correctly.  Treatment Explanation - The following has been discussed with the patient:   RX ordered/plan of care  Anticipated outcomes  Possible risks and side effects  This patient would benefit from PT intervention to resume normal activities.   Rehab potential is good.    Frequency:  1 X week, once daily  Duration:  for 6-8 weeks  Discharge Plan:  Achieve all LTG.  Independent in home treatment program.  Reach maximal therapeutic benefit.    Please refer to the daily flowsheet for treatment today, total treatment time and time spent performing 1:1 timed codes.

## 2018-10-08 ENCOUNTER — TELEPHONE (OUTPATIENT)
Dept: FAMILY MEDICINE | Facility: CLINIC | Age: 53
End: 2018-10-08

## 2018-10-08 ENCOUNTER — TRANSFERRED RECORDS (OUTPATIENT)
Dept: HEALTH INFORMATION MANAGEMENT | Facility: CLINIC | Age: 53
End: 2018-10-08

## 2018-10-08 NOTE — TELEPHONE ENCOUNTER
Clinic received a phone call requesting a CD of MRI of Rt Shoulder    CD burned, attached, both filed [facility]  .    Shoaib Humphrey RT (r)  [facility]

## 2018-10-10 ENCOUNTER — THERAPY VISIT (OUTPATIENT)
Dept: PHYSICAL THERAPY | Facility: CLINIC | Age: 53
End: 2018-10-10
Payer: COMMERCIAL

## 2018-10-10 DIAGNOSIS — N81.89 PELVIC FLOOR WEAKNESS IN FEMALE: ICD-10-CM

## 2018-10-10 DIAGNOSIS — M62.89 PELVIC FLOOR DYSFUNCTION: ICD-10-CM

## 2018-10-10 PROCEDURE — 97112 NEUROMUSCULAR REEDUCATION: CPT | Mod: GP | Performed by: PHYSICAL THERAPIST

## 2018-10-22 DIAGNOSIS — K27.9 PEPTIC ULCER: ICD-10-CM

## 2018-10-22 NOTE — TELEPHONE ENCOUNTER
Prescription approved per [facility]ill [facility].  Sonya Hpokins RN

## 2018-11-23 ENCOUNTER — OFFICE VISIT (OUTPATIENT)
Dept: FAMILY MEDICINE | Facility: CLINIC | Age: 53
End: 2018-11-23
Payer: COMMERCIAL

## 2018-11-23 VITALS
OXYGEN SATURATION: 98 % | HEART RATE: 80 BPM | TEMPERATURE: 97.9 F | BODY MASS INDEX: 30.29 KG/M2 | DIASTOLIC BLOOD PRESSURE: 88 MMHG | RESPIRATION RATE: 16 BRPM | HEIGHT: 65 IN | SYSTOLIC BLOOD PRESSURE: 130 MMHG | WEIGHT: 181.8 LBS

## 2018-11-23 DIAGNOSIS — H66.001 ACUTE SUPPURATIVE OTITIS MEDIA OF RIGHT EAR WITHOUT SPONTANEOUS RUPTURE OF TYMPANIC MEMBRANE, RECURRENCE NOT SPECIFIED: Primary | ICD-10-CM

## 2018-11-23 PROCEDURE — 99213 OFFICE O/P EST LOW 20 MIN: CPT | Performed by: NURSE PRACTITIONER

## 2018-11-23 RX ORDER — AMOXICILLIN 875 MG
875 TABLET ORAL 2 TIMES DAILY
Qty: 14 TABLET | Refills: 0 | Status: SHIPPED | OUTPATIENT
Start: 2018-11-23 | End: 2018-11-30

## 2018-11-23 NOTE — PATIENT INSTRUCTIONS
Otitis Media (Middle-Ear Infection) in Adults  Otitis media is another name for a middle-ear infection. It means an infection behind your eardrum. This kind of ear infection can happen after any condition that keeps fluid from draining from the middle ear. These conditions include allergies, a cold, a sore throat, or a respiratory infection.  Middle-ear infections are common in children, but they can also happen in adults. An ear infection in an adult may mean a more serious problem than in a child. So you may need additional tests. If you have an ear infection, you should see your health care provider for treatment.  What are the types of middle-ear infections?  Infections can affect the middle ear in several ways. They are:    Acute otitis media. This middle-ear infection occurs suddenly. It causes swelling and redness. Fluid and mucus become trapped inside the ear. You can have a fever and ear pain.    Otitis media with effusion. Fluid (effusion) and mucus build up in the middle ear after the infection goes away. You may feel like your middle ear is full. This can continue for months and may affect your hearing.    Chronic otitis media with effusion. Fluid (effusion) remains in the middle ear for a long time. Or it builds up again and again, even though there is no infection. This type of middle-ear infection may be hard to treat. It may also affect your hearing.  Who is more likely to get a middle-ear infection?  You are more likely to get an ear infection if you:    Smoke or are around someone who smokes    Have seasonal or year-round allergy symptoms    Have a cold or other upper respiratory infection  What causes a middle-ear infection?  The middle ear connects to the throat by a canal called the eustachian tube. This tube helps even out the pressure between the outer ear and the inner ear. A cold or allergy can irritate the tube or cause the area around it to swell. This can keep fluid from draining from  the middle ear. The fluid builds up behind the eardrum. Bacteria and viruses can grow in this fluid. The bacteria and viruses cause the middle-ear infection.  What are the symptoms of a middle-ear infection?  Common symptoms of a middle-ear infection in adults are:    Pain in 1 or both ears    Drainage from the ear    Muffled hearing    Sore throat   You may also have a fever. Rarely, your balance can be affected.  These symptoms may be the same as for other conditions. It s important to talk with your health care provider if you think you have a middle-ear infection. If you have a high fever, severe pain behind your ear, or paralysis in your face, see your provider as soon as you can.  How is a middle-ear infection diagnosed?  Your health care provider will take a medical history and do a physical exam. He or she will look at the outer ear and eardrum with an otoscope. The otoscope is a lighted tool that lets your provider see inside the ear. A pneumatic otoscope blows a puff of air into the ear to check how well your eardrum moves. If you eardrum doesn t move well, it may mean you have fluid behind it.  Your provider may also do a test called tympanometry. This test tells how well the middle ear is working. It can find any changes in pressure in the middle ear. Your provider may test your hearing with a tuning fork.  How is a middle-ear infection treated?  A middle-ear infection may be treated with:    Antibiotics, taken by mouth or as ear drops    Medication for pain    Decongestants, antihistamines, or nasal steroids  Your health care provider may also have you try autoinsufflation. This helps adjust the air pressure in your ear. For this, you pinch your nose and gently exhale. This forces air back through the eustachian tube.  The exact treatment for your ear infection will depend on the type of infection you have. In general, if your symptoms don t get better in 48 to 72 hours, contact your health care  provider.  Middle-ear infections can cause long-term problems if not treated. They can lead to:    Infection in other parts of the head    Permanent hearing loss    Paralysis of a nerve in your face  If you have a middle-ear infection that doesn t get better, you may need to see an ear, nose, and throat specialist (otolaryngologist). You may need a CT scan or MRI to check for head and neck cancer.  Ear tubes  Sometimes fluid stays in the middle ear even after you take antibiotics and the infection goes away. In this case, your health care provider may suggest that a small tube be placed in your ear. The tube is put at the opening of the eardrum. The tube keeps fluid from building up and relieves pressure in the middle ear. It can also help you hear better. This surgery is called myringotomy. It is not often done in adults.  The tubes usually fall out on their own after 6 months to a year.    0591-4760 [employer]. [address] [place] [address], [place] [address]. All rights reserved. This information is not intended as a substitute for professional medical care. Always follow your healthcare professional's instructions.

## 2018-11-23 NOTE — PROGRESS NOTES
SUBJECTIVE:   Charlene Morales is a 53 year old female who presents to clinic today for the following health issues:      ENT Symptoms             Symptoms: cc Present Absent Comment   Fever/Chills  x  Not checked but had chills   Fatigue  x     Muscle Aches  x     Eye Irritation  x  drainage and itching   Sneezing  x     Nasal Telly/Drg  x     Sinus Pressure/Pain   x    Loss of smell  x     Dental pain   x    Sore Throat  x  mild   Swollen Glands   x    Ear Pain/Fullness x x  Right ear, blew nose and it became plugged, woke up in the middle of the night with pain   Cough  x  Nonproductive, mild and intermittent   Wheeze   x    Chest Pain   x    Shortness of breath   x    Rash   x    Other         Symptom duration:  1 week   Symptom severity:  mild to moderate, cold is improving-ear pain is new   Treatments tried:  ibuprofen, nyquil, michel seltzer multi sx   Contacts:  teacher       Problem list and histories reviewed & adjusted, as indicated.  Additional history: as documented    Patient Active Problem List   Diagnosis     Hypertension, benign essential, goal below 140/90     Peptic ulcer     Headache     BARBITUATE  DEPENDENCE      IRRITABLE BLADDER     CYSTIC ACNE     Weight gain     Health Care Home     [facility]     Hyperlipidemia LDL goal <130     History of  addiction  in treatment 7 year ago      Major depressive disorder, recurrent episode, moderate (H)     Primary insomnia     Pelvic floor weakness in female     Pelvic floor dysfunction     Past Surgical History:   Procedure Laterality Date     HC COLONOSCOPY THRU STOMA, DIAGNOSTIC  5/2/03    Normal. Repeat at age 52     HYSTERECTOMY, PAP NO LONGER INDICATED  2004    Still has ovaries and      SURGICAL HISTORY OF -   8/23/2004    Hemorrhoidectomy & Partial Lateral Sphincterotomy     SURGICAL HISTORY OF -   5/2/2003    Hemorrhoid Banding x2       Social History   Substance Use Topics     Smoking status: Never Smoker     Smokeless tobacco: Never Used     Alcohol use  "Yes      Comment: 3-4 drinks a week at most- wine      Family History   Problem Relation Age of Onset     Arthritis Mother      Hypertension Mother      HEART DISEASE Father      Diabetes Father      Lipids Father      Alcohol/Drug Father      C.A.D. Father      open heart surgery at age 50     Cerebrovascular Disease Father      Hypertension Maternal Grandfather      Alzheimer Disease Maternal Grandfather      Colon Cancer Maternal Grandfather      age 65     HEART DISEASE Paternal Grandfather      Thyroid Disease Sister      Hypertension Sister      Hypertension Sister      Breast Cancer No family hx of            Reviewed and updated as needed this visit by clinical staff       Reviewed and updated as needed this visit by Provider         ROS:  Constitutional, HEENT, cardiovascular, pulmonary, gi and gu systems are negative, except as otherwise noted.    OBJECTIVE:     /88 (BP Location: Left arm, Patient Position: Sitting, Cuff Size: Adult Regular)  Pulse 80  Temp 97.9  F (36.6  C) (Tympanic)  Resp 16  Ht 5' 5\" (1.651 m)  Wt 181 lb 12.8 oz (82.5 kg)  SpO2 98%  BMI 30.25 kg/m2  Body mass index is 30.25 kg/(m^2).  GENERAL: healthy, alert and no distress  EYES: Eyes grossly normal to inspection, PERRL and conjunctivae and sclerae normal  HENT: normal cephalic/atraumatic, right ear: erythematous, bulging membrane and mucopurulent effusion, left ear: clear effusion and erythematous, nose and mouth without ulcers or lesions, oropharynx clear, oral mucous membranes moist and sinuses: not tender  NECK: no adenopathy, no asymmetry, masses, or scars and thyroid normal to palpation  RESP: lungs clear to auscultation - no rales, rhonchi or wheezes  CV: regular rates and rhythm, normal S1 S2, no S3 or S4 and no murmur, click or rub  MS: no gross musculoskeletal defects noted, no edema  SKIN: no suspicious lesions or rashes  NEURO: Normal strength and tone, mentation intact and speech normal    Diagnostic Test " Results:  none     ASSESSMENT/PLAN:       ICD-10-CM    1. Acute suppurative otitis media of right ear without spontaneous rupture of tympanic membrane, recurrence not specified H66.001 amoxicillin (AMOXIL) 875 MG tablet       FUTURE APPOINTMENTS:       - Follow up in 3-5 days for symptoms that are not improving, sooner for new or worsening sx.     Patient Instructions     Otitis Media (Middle-Ear Infection) in Adults  Otitis media is another name for a middle-ear infection. It means an infection behind your eardrum. This kind of ear infection can happen after any condition that keeps fluid from draining from the middle ear. These conditions include allergies, a cold, a sore throat, or a respiratory infection.  Middle-ear infections are common in children, but they can also happen in adults. An ear infection in an adult may mean a more serious problem than in a child. So you may need additional tests. If you have an ear infection, you should see your health care provider for treatment.  What are the types of middle-ear infections?  Infections can affect the middle ear in several ways. They are:    Acute otitis media. This middle-ear infection occurs suddenly. It causes swelling and redness. Fluid and mucus become trapped inside the ear. You can have a fever and ear pain.    Otitis media with effusion. Fluid (effusion) and mucus build up in the middle ear after the infection goes away. You may feel like your middle ear is full. This can continue for months and may affect your hearing.    Chronic otitis media with effusion. Fluid (effusion) remains in the middle ear for a long time. Or it builds up again and again, even though there is no infection. This type of middle-ear infection may be hard to treat. It may also affect your hearing.  Who is more likely to get a middle-ear infection?  You are more likely to get an ear infection if you:    Smoke or are around someone who smokes    Have seasonal or year-round allergy  symptoms    Have a cold or other upper respiratory infection  What causes a middle-ear infection?  The middle ear connects to the throat by a canal called the eustachian tube. This tube helps even out the pressure between the outer ear and the inner ear. A cold or allergy can irritate the tube or cause the area around it to swell. This can keep fluid from draining from the middle ear. The fluid builds up behind the eardrum. Bacteria and viruses can grow in this fluid. The bacteria and viruses cause the middle-ear infection.  What are the symptoms of a middle-ear infection?  Common symptoms of a middle-ear infection in adults are:    Pain in 1 or both ears    Drainage from the ear    Muffled hearing    Sore throat   You may also have a fever. Rarely, your balance can be affected.  These symptoms may be the same as for other conditions. It s important to talk with your health care provider if you think you have a middle-ear infection. If you have a high fever, severe pain behind your ear, or paralysis in your face, see your provider as soon as you can.  How is a middle-ear infection diagnosed?  Your health care provider will take a medical history and do a physical exam. He or she will look at the outer ear and eardrum with an otoscope. The otoscope is a lighted tool that lets your provider see inside the ear. A pneumatic otoscope blows a puff of air into the ear to check how well your eardrum moves. If you eardrum doesn t move well, it may mean you have fluid behind it.  Your provider may also do a test called tympanometry. This test tells how well the middle ear is working. It can find any changes in pressure in the middle ear. Your provider may test your hearing with a tuning fork.  How is a middle-ear infection treated?  A middle-ear infection may be treated with:    Antibiotics, taken by mouth or as ear drops    Medication for pain    Decongestants, antihistamines, or nasal steroids  Your health care provider may  also have you try autoinsufflation. This helps adjust the air pressure in your ear. For this, you pinch your nose and gently exhale. This forces air back through the eustachian tube.  The exact treatment for your ear infection will depend on the type of infection you have. In general, if your symptoms don t get better in 48 to 72 hours, contact your health care provider.  Middle-ear infections can cause long-term problems if not treated. They can lead to:    Infection in other parts of the head    Permanent hearing loss    Paralysis of a nerve in your face  If you have a middle-ear infection that doesn t get better, you may need to see an ear, nose, and throat specialist (otolaryngologist). You may need a CT scan or MRI to check for head and neck cancer.  Ear tubes  Sometimes fluid stays in the middle ear even after you take antibiotics and the infection goes away. In this case, your health care provider may suggest that a small tube be placed in your ear. The tube is put at the opening of the eardrum. The tube keeps fluid from building up and relieves pressure in the middle ear. It can also help you hear better. This surgery is called myringotomy. It is not often done in adults.  The tubes usually fall out on their own after 6 months to a year.    6124-7171 [employer]. [address] [place] [address], [place] [address]. All rights reserved. This information is not intended as a substitute for professional medical care. Always follow your healthcare professional's instructions.            NADINE Hunt [facility]

## 2018-11-23 NOTE — MR AVS SNAPSHOT
After Visit Summary   11/23/2018    Charlene Morales    MRN: [id number]           Patient Information     Date Of Birth          1965        Visit Information        Provider Department      11/23/2018 3:00 PM Meghan Cohn APRN [facility]        Today's Diagnoses     Acute suppurative otitis media of right ear without spontaneous rupture of tympanic membrane, recurrence not specified    -  1      Care Instructions      Otitis Media (Middle-Ear Infection) in Adults  Otitis media is another name for a middle-ear infection. It means an infection behind your eardrum. This kind of ear infection can happen after any condition that keeps fluid from draining from the middle ear. These conditions include allergies, a cold, a sore throat, or a respiratory infection.  Middle-ear infections are common in children, but they can also happen in adults. An ear infection in an adult may mean a more serious problem than in a child. So you may need additional tests. If you have an ear infection, you should see your health care provider for treatment.  What are the types of middle-ear infections?  Infections can affect the middle ear in several ways. They are:    Acute otitis media. This middle-ear infection occurs suddenly. It causes swelling and redness. Fluid and mucus become trapped inside the ear. You can have a fever and ear pain.    Otitis media with effusion. Fluid (effusion) and mucus build up in the middle ear after the infection goes away. You may feel like your middle ear is full. This can continue for months and may affect your hearing.    Chronic otitis media with effusion. Fluid (effusion) remains in the middle ear for a long time. Or it builds up again and again, even though there is no infection. This type of middle-ear infection may be hard to treat. It may also affect your hearing.  Who is more likely to get a middle-ear infection?  You are more likely to get an ear  infection if you:    Smoke or are around someone who smokes    Have seasonal or year-round allergy symptoms    Have a cold or other upper respiratory infection  What causes a middle-ear infection?  The middle ear connects to the throat by a canal called the eustachian tube. This tube helps even out the pressure between the outer ear and the inner ear. A cold or allergy can irritate the tube or cause the area around it to swell. This can keep fluid from draining from the middle ear. The fluid builds up behind the eardrum. Bacteria and viruses can grow in this fluid. The bacteria and viruses cause the middle-ear infection.  What are the symptoms of a middle-ear infection?  Common symptoms of a middle-ear infection in adults are:    Pain in 1 or both ears    Drainage from the ear    Muffled hearing    Sore throat   You may also have a fever. Rarely, your balance can be affected.  These symptoms may be the same as for other conditions. It s important to talk with your health care provider if you think you have a middle-ear infection. If you have a high fever, severe pain behind your ear, or paralysis in your face, see your provider as soon as you can.  How is a middle-ear infection diagnosed?  Your health care provider will take a medical history and do a physical exam. He or she will look at the outer ear and eardrum with an otoscope. The otoscope is a lighted tool that lets your provider see inside the ear. A pneumatic otoscope blows a puff of air into the ear to check how well your eardrum moves. If you eardrum doesn t move well, it may mean you have fluid behind it.  Your provider may also do a test called tympanometry. This test tells how well the middle ear is working. It can find any changes in pressure in the middle ear. Your provider may test your hearing with a tuning fork.  How is a middle-ear infection treated?  A middle-ear infection may be treated with:    Antibiotics, taken by mouth or as ear  drops    Medication for pain    Decongestants, antihistamines, or nasal steroids  Your health care provider may also have you try autoinsufflation. This helps adjust the air pressure in your ear. For this, you pinch your nose and gently exhale. This forces air back through the eustachian tube.  The exact treatment for your ear infection will depend on the type of infection you have. In general, if your symptoms don t get better in 48 to 72 hours, contact your health care provider.  Middle-ear infections can cause long-term problems if not treated. They can lead to:    Infection in other parts of the head    Permanent hearing loss    Paralysis of a nerve in your face  If you have a middle-ear infection that doesn t get better, you may need to see an ear, nose, and throat specialist (otolaryngologist). You may need a CT scan or MRI to check for head and neck cancer.  Ear tubes  Sometimes fluid stays in the middle ear even after you take antibiotics and the infection goes away. In this case, your health care provider may suggest that a small tube be placed in your ear. The tube is put at the opening of the eardrum. The tube keeps fluid from building up and relieves pressure in the middle ear. It can also help you hear better. This surgery is called myringotomy. It is not often done in adults.  The tubes usually fall out on their own after 6 months to a year.    [id number] [employer]. [address] [place] [address]. All rights reserved. This information is not intended as a substitute for professional medical care. Always follow your healthcare professional's instructions.                Follow-ups after your visit        Your next 10 appointments already scheduled     Nov 26, 2018  3:45 PM CST   [facility]   [facility] ([facility]  )    [address] [facility] [address] [phone]              Who to contact     Normal or  "non-critical lab and imaging results will be communicated to you by MyChart, letter or phone within 4 business days after the clinic has received the results. If you do not hear from us within 7 days, please contact the clinic through [employer] or phone. If you have a critical or abnormal lab result, we will notify you by phone as soon as possible.  Submit refill requests through [employer] or call your pharmacy and they will forward the refill request to us. Please allow 3 business days for your refill to be completed.          If you need to speak with a  for additional information , please call: [phone]           Additional Information About Your Visit        [employer] Information     [employer] gives you secure access to your electronic health record. If you see a primary care provider, you can also send messages to your care team and make appointments. If you have questions, please call your primary care clinic.  If you do not have a primary care provider, please call [phone] and they will assist you.        Care EveryWhere ID     This is your Care EveryWhere ID. This could be used by other organizations to access your [facility] medical records  [id number]        Your Vitals Were     Pulse Temperature Respirations Height Pulse Oximetry BMI (Body Mass Index)    80 97.9  F (36.6  C) (Tympanic) 16 5' 5\" (1.651 m) 98% 30.25 kg/m2       Blood Pressure from Last 3 Encounters:   11/23/18 130/88   05/03/18 146/86   04/10/18 106/68    Weight from Last 3 Encounters:   11/23/18 181 lb 12.8 oz (82.5 kg)   04/10/18 191 lb 12.8 oz (87 kg)   02/06/18 190 lb (86.2 kg)              Today, you had the following     No orders found for display         Today's Medication Changes          These changes are accurate as of 11/23/18  3:13 PM.  If you have any questions, ask your nurse or doctor.               Start taking these medicines.        Dose/Directions    amoxicillin 875 MG tablet   Commonly known as:  " AMOXIL   Used for:  Acute suppurative otitis media of right ear without spontaneous rupture of tympanic membrane, recurrence not specified   Started by:  Meghan Cohn APRN CNP        Dose:  875 mg   Take 1 tablet (875 mg) by mouth 2 times daily for 7 days   Quantity:  14 tablet   Refills:  0            Where to get your medicines      These medications were sent to [facility] [address] [facility]  [address] [facility] [address]     Phone:  [phone]     amoxicillin 875 MG tablet                Primary Care Provider Office Phone # Fax #    NADINE Cooper [phone] [address] [facility]   [facility] [address]        Equal Access to Services     LYSSA MARTINEZ AH: Hadii aad ku hadasho Sojosieali, waaxda luqadaha, qaybta kaalmada adeegyada, alanis yuen. So [facility] [phone].    ATENCIÓN: Si habla español, tiene a talley disposición servicios gratuitos de asistencia lingüística. [facility] [phone].    We comply with applicable federal civil rights laws and [place] laws. We do not discriminate on the basis of race, color, national origin, age, disability, sex, sexual orientation, or gender identity.            Thank you!     Thank you for choosing [facility]  for your care. Our goal is always to provide you with excellent care. Hearing back from our patients is one way we can continue to improve our services. Please take a few minutes to complete the written survey that you may receive in the mail after your visit with us. Thank you!             Your Updated Medication List - Protect others around you: Learn how to safely use, store and throw away your medicines at www.disposemymeds.org.          This list is accurate as of 11/23/18  3:13 PM.  Always use your most recent med list.                   Brand Name Dispense Instructions for use Diagnosis    amoxicillin 875 MG tablet    AMOXIL    14  tablet    Take 1 tablet (875 mg) by mouth 2 times daily for 7 days    Acute suppurative otitis media of right ear without spontaneous rupture of tympanic membrane, recurrence not specified       atorvastatin 40 MG tablet    LIPITOR    90 tablet    Take 1 tablet (40 mg) by mouth daily    Hyperlipidemia LDL goal <130       * chlorthalidone 25 MG tablet    HYGROTON    15 tablet    TAKE ONE-HALF TABLET BY MOUTH EVERY DAY (NEED APPOINTMENT FOR FURTHER REFILLS)    Hypertension, benign essential, goal below 140/90       * chlorthalidone 25 MG tablet    HYGROTON    45 tablet    TAKE ONE-HALF TABLET BY MOUTH DAILY    Hypertension, benign essential, goal below 140/90       ibuprofen 200 MG tablet    ADVIL/MOTRIN    120    5 orally at onset of headache    Headache(784.0)       lisinopril 40 MG tablet    PRINIVIL/ZESTRIL    90 tablet    Take 1 tablet (40 mg) by mouth daily    Hypertension, benign essential, goal below 140/90       omeprazole 20 MG CR capsule    priLOSEC    180 capsule    TAKE TWO CAPSULES (40MG)  BY MOUTH EVERY DAY    Peptic ulcer       PARoxetine 40 MG tablet    PAXIL    90 tablet    Take 1 tablet (40 mg) by mouth daily    Major depressive disorder, single episode, moderate (H)       sucralfate 1 GM tablet    CARAFATE    360 tablet    TAKE ONE TABLET BY MOUTH FOUR TIMES A DAY    Abdominal pain, right upper quadrant, History of gastric ulcer       traZODone 100 MG tablet    DESYREL    60 tablet    TAKE ONE OR TWO TABLETS BY MOUTH EVERY NIGHT AT BEDTIME    Primary insomnia       * Notice:  This list has 2 medication(s) that are the same as other medications prescribed for you. Read the directions carefully, and ask your doctor or other care provider to review them with you.

## 2019-01-07 PROBLEM — N81.89 PELVIC FLOOR WEAKNESS IN FEMALE: Status: RESOLVED | Noted: 2018-10-04 | Resolved: 2019-01-07

## 2019-01-07 PROBLEM — M62.89 PELVIC FLOOR DYSFUNCTION: Status: RESOLVED | Noted: 2018-10-04 | Resolved: 2019-01-07

## 2019-01-07 NOTE — PROGRESS NOTES
Subjective:  HPI                    Objective:  System    Physical Exam    General     ROS    Assessment/Plan:    DISCHARGE REPORT    Progress reporting period is from 10/10/18 to 1/7/19.     SUBJECTIVE  Subjective: SOAP: Pt reports she though she was doing pretty well but then on Monday, she did 2 little jumps with a dance and noticed things shifted. Feels like things fell down and feels it's been there noticable since then. Feels there's tissue touching inside there. No pain. Has been using a stool to defecate which has really helped and notes she's not having to push as hard and feels things are coming out a lot easier.           Changes in function: No changes noted in function since last SOAP note   Adverse reactions: None;   ,     Patient has failed to return to therapy so current objective findings are unknown.  The subjective and objective information are from the last SOAP note on this patient.  The objective findings are from DOS 10/10/18.    OBJECTIVE  Objective: Visualization revealed rectocele and cystocele at level of introitus today. MMT: 2/5 with poor L levator ani (? avulsion) and fair R levator strength. Gets most of her strength from 1st layer of PF.      ASSESSMENT/PLAN  Updated problem list and treatment plan: Diagnosis 1:  Pelvic floor dysfunction  Decreased ROM/flexibility - manual therapy, therapeutic exercise, therapeutic activity and home program  Decreased strength - therapeutic exercise, therapeutic activities and home program  Impaired muscle performance - neuro re-education and home program  Decreased function - therapeutic activities and home program  STG/LTGs have been met or progress has been made towards goals:  Unknown as patient did not return for final assessment.    Assessment of Progress: The patient has not returned to therapy. Current status is unknown.  Self Management Plans:  Patient has been instructed in a home treatment program.  Patient  has been instructed in self  management of symptoms.    Charlene continues to require the following intervention to meet STG and LTG's: PT intervention is no longer required to meet STG/LTG.  We will discharge this patient from PT.    Recommendations:  Pt cancelled several appts due to various reasons and has not re-scheduled. Will discharge at this time.    Please refer to the daily flowsheet for treatment today, total treatment time and time spent performing 1:1 timed codes.

## 2019-01-21 ENCOUNTER — OFFICE VISIT (OUTPATIENT)
Dept: FAMILY MEDICINE | Facility: CLINIC | Age: 54
End: 2019-01-21
Payer: COMMERCIAL

## 2019-01-21 VITALS
TEMPERATURE: 98.1 F | HEART RATE: 60 BPM | BODY MASS INDEX: 30.35 KG/M2 | DIASTOLIC BLOOD PRESSURE: 82 MMHG | SYSTOLIC BLOOD PRESSURE: 128 MMHG | HEIGHT: 65 IN | WEIGHT: 182.2 LBS

## 2019-01-21 DIAGNOSIS — N81.6 RECTOCELE: Primary | ICD-10-CM

## 2019-01-21 DIAGNOSIS — L30.9 DERMATITIS: ICD-10-CM

## 2019-01-21 DIAGNOSIS — I10 HYPERTENSION, BENIGN ESSENTIAL, GOAL BELOW 140/90: ICD-10-CM

## 2019-01-21 DIAGNOSIS — N81.11 CYSTOCELE, MIDLINE: ICD-10-CM

## 2019-01-21 DIAGNOSIS — D22.9 CHANGE IN MOLE: ICD-10-CM

## 2019-01-21 PROCEDURE — 99214 OFFICE O/P EST MOD 30 MIN: CPT | Performed by: NURSE PRACTITIONER

## 2019-01-21 RX ORDER — CHLORTHALIDONE 25 MG/1
TABLET ORAL
Qty: 90 TABLET | Refills: 1 | Status: SHIPPED | OUTPATIENT
Start: 2019-01-21 | End: 2019-08-12

## 2019-01-21 ASSESSMENT — MIFFLIN-ST. JEOR: SCORE: 1438.58

## 2019-01-21 NOTE — PATIENT INSTRUCTIONS
For the rectocele and cystocele   See  GYN       I did also give you a referral to  Colorectal .       Start with  GYN     For the moles.  Consult with  [facility] at [place]

## 2019-01-21 NOTE — PROGRESS NOTES
SUBJECTIVE:   Charlene Morales is a 53 year old female who presents to clinic today for the following health issues:    *Pelvic Problem - Patient here to discuss collapsing pelvic floor and possible surgery.  She was doing physical therapy last fall.    Was told that the exercises will not correct.   Sex is OK  ,  says that things are normal     Does have multiple moles that she would like to have checked     Problem list and histories reviewed & adjusted, as indicated.  Additional history: as documented    Patient Active Problem List   Diagnosis     Hypertension, benign essential, goal below 140/90     Peptic ulcer     Headache     BARBITUATE  DEPENDENCE      IRRITABLE BLADDER     CYSTIC ACNE     Weight gain     Health Care Home     [facility]     Hyperlipidemia LDL goal <130     History of  addiction  in treatment 7 year ago      Major depressive disorder, recurrent episode, moderate (H)     Primary insomnia     Past Surgical History:   Procedure Laterality Date     HC COLONOSCOPY THRU STOMA, DIAGNOSTIC  5/2/03    Normal. Repeat at age 52     HYSTERECTOMY, PAP NO LONGER INDICATED  2004    Still has ovaries and      SURGICAL HISTORY OF -   8/23/2004    Hemorrhoidectomy & Partial Lateral Sphincterotomy     SURGICAL HISTORY OF -   5/2/2003    Hemorrhoid Banding x2       Social History     Tobacco Use     Smoking status: Never Smoker     Smokeless tobacco: Never Used   Substance Use Topics     Alcohol use: Yes     Comment: 3-4 drinks a week at most- wine      Family History   Problem Relation Age of Onset     Arthritis Mother      Hypertension Mother      Heart Disease Father      Diabetes Father      Lipids Father      Alcohol/Drug Father      C.A.D. Father         open heart surgery at age 50     Cerebrovascular Disease Father      Abdominal Aortic Aneurysm Father      Hypertension Maternal Grandfather      Alzheimer Disease Maternal Grandfather      Colon Cancer Maternal Grandfather         age 65     Heart Disease  Paternal Grandfather      Thyroid Disease Sister      Hypertension Sister      Hypertension Sister      Breast Cancer No family hx of          Current Outpatient Medications   Medication Sig Dispense Refill     atorvastatin (LIPITOR) 40 MG tablet Take 1 tablet (40 mg) by mouth daily 90 tablet 3     chlorthalidone (HYGROTON) 25 MG tablet TAKE ONE-HALF TABLET BY MOUTH DAILY 45 tablet 3     lisinopril (PRINIVIL/ZESTRIL) 40 MG tablet Take 1 tablet (40 mg) by mouth daily 90 tablet 3     omeprazole (PRILOSEC) 20 MG CR capsule TAKE TWO CAPSULES (40MG)  BY MOUTH EVERY  capsule 1     PARoxetine (PAXIL) 40 MG tablet Take 1 tablet (40 mg) by mouth daily 90 tablet 2     traZODone (DESYREL) 100 MG tablet TAKE ONE OR TWO TABLETS BY MOUTH EVERY NIGHT AT BEDTIME 60 tablet 8     chlorthalidone (HYGROTON) 25 MG tablet TAKE ONE-HALF TABLET BY MOUTH EVERY DAY (NEED APPOINTMENT FOR FURTHER REFILLS) 15 tablet 0     IBUPROFEN 200 MG OR TABS 5 orally at onset of headache 120 prn     sucralfate (CARAFATE) 1 GM tablet TAKE ONE TABLET BY MOUTH FOUR TIMES A DAY (Patient not taking: Reported on 11/23/2018) 360 tablet 2     Allergies   Allergen Reactions     Sulfa Drugs      BP Readings from Last 3 Encounters:   01/21/19 128/82   11/23/18 130/88   05/03/18 146/86    Wt Readings from Last 3 Encounters:   01/21/19 82.6 kg (182 lb 3.2 oz)   11/23/18 82.5 kg (181 lb 12.8 oz)   04/10/18 87 kg (191 lb 12.8 oz)                    Reviewed and updated as needed this visit by clinical staff       Reviewed and updated as needed this visit by Provider         ROS:  CONSTITUTIONAL: NEGATIVE for fever, chills, change in weight  SKIN: POSITIVE for multiple moles  ? If several are changing   RESP: NEGATIVE for significant cough or SOB  CV: NEGATIVE for chest pain, palpitations or peripheral edema  GI: POSITIVE for does issues with the rectum  Does have normal BM's  Will at times have a firm BM and will take stool softner   : hx of  Hysterectomy.  Does  "have issues with  Both  Rectocele and  Cystocele ., is having a lot of problems with the rectocele     OBJECTIVE:     /82   Pulse 60   Temp 98.1  F (36.7  C) (Tympanic)   Ht 1.645 m (5' 4.76\")   Wt 82.6 kg (182 lb 3.2 oz)   BMI 30.54 kg/m    Body mass index is 30.54 kg/m .   GENERAL: healthy, alert and no distress  NECK: no adenopathy, no asymmetry, masses, or scars and thyroid normal to palpation  RESP: lungs clear to auscultation - no rales, rhonchi or wheezes  CV: regular rate and rhythm, normal S1 S2, no S3 or S4, no murmur, click or rub, no peripheral edema and peripheral pulses strong   (female): cystocele present  RECTAL (female): sphincter tone decrease,  and does have  Small amount of stool at the end of the rectal scope.   SKIN: does have multiple moles.     Diagnostic Test Results:  Pending     ASSESSMENT/PLAN:     ASSESSMENT/PLAN:      ICD-10-CM    1. Rectocele N81.6 COLORECTAL SURGERY REFERRAL   2. Cystocele, midline N81.11 OB/GYN REFERRAL     COLORECTAL SURGERY REFERRAL   3. Hypertension, benign essential, goal below 140/90 I10 chlorthalidone (HYGROTON) 25 MG tablet   4. Change in mole D22.9 PLASTIC SURGERY REFERRAL     DERMATOLOGY REFERRAL   5. Dermatitis L30.9        Patient Instructions   For the rectocele and cystocele   See  GYN       I did also give you a referral to  Colorectal .       Start with  GYN     For the moles.  Consult with  Derm at [place]         CONSULTATION/REFERRAL to Derm.   GYN  Colorectal   See Patient Instructions    YASMANY ROMAN NP, APRN [facility]    "

## 2019-02-04 DIAGNOSIS — I10 HYPERTENSION, BENIGN ESSENTIAL, GOAL BELOW 140/90: ICD-10-CM

## 2019-02-05 RX ORDER — CHLORTHALIDONE 25 MG/1
TABLET ORAL
Qty: 15 TABLET | Refills: 0 | Status: SHIPPED | OUTPATIENT
Start: 2019-02-05 | End: 2019-07-22

## 2019-02-05 NOTE — TELEPHONE ENCOUNTER
"Requested Prescriptions   Pending Prescriptions Disp Refills     chlorthalidone (HYGROTON) 25 MG tablet [Pharmacy Med Name: CHLORTHALIDONE 25MG TABS] 45 tablet 3    Last Written Prescription Date:  01/21/2019 #90 x 1  Last filled 12/18/2018  Last office visit: 1/21/2019 AISHA Vázquez   Future Office Visit: None    Sig: TAKE ONE-HALF TABLET BY MOUTH EVERY DAY    Diuretics (Including Combos) Protocol Failed - 2/4/2019  3:48 PM       Failed - Normal serum creatinine on file in past 12 months    Recent Labs   Lab Test 03/10/17  0844   CR 0.92             Failed - Normal serum potassium on file in past 12 months    Recent Labs   Lab Test 03/10/17  0844   POTASSIUM 3.8                   Failed - Normal serum sodium on file in past 12 months    Recent Labs   Lab Test 03/10/17  0844   *             Passed - Blood pressure under 140/90 in past 12 months    BP Readings from Last 3 Encounters:   01/21/19 128/82   11/23/18 130/88   05/03/18 146/86                Passed - Recent (12 mo) or future (30 days) visit within the authorizing provider's specialty    Patient had office visit in the last 12 months or has a visit in the next 30 days with authorizing provider or within the authorizing provider's specialty.  See \"Patient Info\" tab in inbasket, or \"Choose Columns\" in Meds & Orders section of the refill encounter.             Passed - Medication is active on med list       Passed - Patient is age 18 or older       Passed - No active pregancy on record       Passed - No positive pregnancy test in past 12 months          "

## 2019-02-12 ENCOUNTER — DOCUMENTATION ONLY (OUTPATIENT)
Dept: CARE COORDINATION | Facility: CLINIC | Age: 54
End: 2019-02-12

## 2019-02-13 ENCOUNTER — THERAPY VISIT (OUTPATIENT)
Dept: PHYSICAL THERAPY | Facility: CLINIC | Age: 54
End: 2019-02-13
Payer: COMMERCIAL

## 2019-02-13 DIAGNOSIS — M75.21 BICEPS TENDONITIS, RIGHT: ICD-10-CM

## 2019-02-13 PROCEDURE — 97161 PT EVAL LOW COMPLEX 20 MIN: CPT | Mod: GP | Performed by: PHYSICAL THERAPIST

## 2019-02-13 PROCEDURE — 97110 THERAPEUTIC EXERCISES: CPT | Mod: GP | Performed by: PHYSICAL THERAPIST

## 2019-02-13 NOTE — PROGRESS NOTES
[facility] Initial Evaluation  Subjective:  The history is provided by the patient.   Charlene Morales is a 54 year old female with a right shoulder condition.  Occurance: Walking dog.  Condition occurred: during recreation/sport.  This is a chronic condition  Pt reports a 2.5 year history of R shoulder pain. Occurred while walking dog. Locates pain in anterior aspect of R shoulder. Describes pain as a dull achy pain, sharp at times. Pain worse with lifting objects up with R hand from waste level as well as laying on R shoulder. Pain improved with OTC medication. Pt is a  for Language/Arts. Pt does have history of shoulder dislocations. .    Patient reports pain:  Anterior.      and reported as 2/10 and 3/10.  Associated with: Instability.        Special tests:  MRI (No significant findings, see report).      General health as reported by patient is good.  Pertinent medical history includes:  Depression, high blood pressure and overweight.  Medical allergies: yes (Sulfa).  Surgical history: Hysterectomy.  Current medications:  Anti-depressants, high blood pressure medication, pain medication and sleep medication.  Current occupation is Teacher  .  Patient is working in normal job without restrictions.                                  Objective:  System                   Shoulder Evaluation:  ROM:  AROM:    Flexion:  Left:  Wnl    Right:  Wnl  Extension: Left: wnlRight: wnl  Abduction:  Left: wnl   Right:  Wnl    Internal Rotation:  Left:  T4    Right:  T6  External Rotation:  Left:  T4    Right:  T4                  Pain: pain end ragne abduction and flexion    Strength:    Flexion: Left:5/5   Pain:    Right: 5/5      Pain:  +  Extension:  Left: 5/5    Pain:    Right: 5/5    Pain:  Abduction:  Left: 5/5  Pain:    Right: 5/5      Pain:-    Internal Rotation:  Left:5/5     Pain:    Right: 5/5      Pain:+  External Rotation:   Left:5/5     Pain:   Right:5/5     Pain:             Stability Testing:        Right shoulder stability negative testing:  Apprehension; Relocation and Sulcus sign  Special Tests:        Right shoulder negative for the following special tests:Labral; Impingement; Bursal; Neural Tension and Rotator cuff tear  Palpation:  Palpation assessed shoulder: Long head biceps R shoulder.    Right shoulder tenderness present at: Biceps  Mobility Tests:      Glenohumeral posterior right:  Normal  Glenohumeral inferior right:  Normal                                             General     ROS    Assessment/Plan:    Patient is a 54 year old female with right side shoulder complaints.    Patient has the following significant findings with corresponding treatment plan.                Diagnosis 1:  R proximal biceps tendonopathy  Pain -  hot/cold therapy, US, electric stimulation, mechanical traction, manual therapy, splint/taping/bracing/orthotics, self management, education, directional preference exercise and home program  Decreased ROM/flexibility - manual therapy, therapeutic exercise and home program  Decreased joint mobility - manual therapy, therapeutic exercise and home program  Decreased strength - therapeutic exercise, therapeutic activities and home program  Impaired muscle performance - neuro re-education and home program  Decreased function - therapeutic activities and home program    Therapy Evaluation Codes:   1) History comprised of:   Personal factors that impact the plan of care:      None.    Comorbidity factors that impact the plan of care are:      Depression, High blood pressure and Overweight.     Medications impacting care: Anti-depressant, High blood pressure, Pain and Sleep.  2) Examination of Body Systems comprised of:   Body structures and functions that impact the plan of care:      Shoulder.   Activity limitations that impact the plan of care are:      Bathing, Cooking, Lifting and Working.  3) Clinical presentation characteristics  are:   Stable/Uncomplicated.  4) Decision-Making    Low complexity using standardized patient assessment instrument and/or measureable assessment of functional outcome.  Cumulative Therapy Evaluation is: Low complexity.    Previous and current functional limitations:  (See Goal Flow Sheet for this information)    Short term and Long term goals: (See Goal Flow Sheet for this information)     Communication ability:  Patient appears to be able to clearly communicate and understand verbal and written communication and follow directions correctly.  Treatment Explanation - The following has been discussed with the patient:   RX ordered/plan of care  Anticipated outcomes  Possible risks and side effects  This patient would benefit from PT intervention to resume normal activities.   Rehab potential is good.    Frequency:  1 X week, once daily  Duration:  for 8 weeks  Discharge Plan:  Achieve all LTG.  Independent in home treatment program.  Reach maximal therapeutic benefit.    Please refer to the daily flowsheet for treatment today, total treatment time and time spent performing 1:1 timed codes.

## 2019-03-28 DIAGNOSIS — F32.1 MAJOR DEPRESSIVE DISORDER, SINGLE EPISODE, MODERATE (H): ICD-10-CM

## 2019-03-29 ENCOUNTER — MYC MEDICAL ADVICE (OUTPATIENT)
Dept: FAMILY MEDICINE | Facility: CLINIC | Age: 54
End: 2019-03-29

## 2019-03-29 NOTE — TELEPHONE ENCOUNTER
"Requested Prescriptions   Pending Prescriptions Disp Refills     PARoxetine (PAXIL) 40 MG tablet 90 tablet 2    Last Written Prescription Date:  04/10/2018 #90 x 2  Last filled - not provided  Last office visit: 1/21/2019 AISHA Vázquez   Future Office Visit: None    Sig: Take 1 tablet (40 mg) by mouth daily    SSRIs Protocol Failed - 3/28/2019  5:33 PM       Failed - PHQ-9 score less than 5 in past 6 months    Please review last PHQ-9 score.   PHQ-9 SCORE 5/9/2016 3/10/2017 1/29/2018   PHQ-9 Total Score - - -   PHQ-9 Total Score 3 1 4       JOANA-7 SCORE 11/18/2014   Total Score 0                Passed - Medication is active on med list       Passed - Patient is age 18 or older       Passed - No active pregnancy on record       Passed - No positive pregnancy test in last 12 months       Passed - Recent (6 mo) or future (30 days) visit within the authorizing provider's specialty    Patient had office visit in the last 6 months or has a visit in the next 30 days with authorizing provider or within the authorizing provider's specialty.  See \"Patient Info\" tab in inbasket, or \"Choose Columns\" in Meds & Orders section of the refill encounter.              "

## 2019-04-01 RX ORDER — PAROXETINE 40 MG/1
40 TABLET, FILM COATED ORAL DAILY
Qty: 30 TABLET | Refills: 3 | Status: SHIPPED | OUTPATIENT
Start: 2019-04-01 | End: 2019-08-29

## 2019-04-01 ASSESSMENT — ANXIETY QUESTIONNAIRES
GAD7 TOTAL SCORE: 1
6. BECOMING EASILY ANNOYED OR IRRITABLE: NOT AT ALL
7. FEELING AFRAID AS IF SOMETHING AWFUL MIGHT HAPPEN: NOT AT ALL
GAD7 TOTAL SCORE: 1
5. BEING SO RESTLESS THAT IT IS HARD TO SIT STILL: NOT AT ALL
2. NOT BEING ABLE TO STOP OR CONTROL WORRYING: NOT AT ALL
1. FEELING NERVOUS, ANXIOUS, OR ON EDGE: SEVERAL DAYS
3. WORRYING TOO MUCH ABOUT DIFFERENT THINGS: NOT AT ALL
4. TROUBLE RELAXING: NOT AT ALL
GAD7 TOTAL SCORE: 1
7. FEELING AFRAID AS IF SOMETHING AWFUL MIGHT HAPPEN: NOT AT ALL

## 2019-04-01 ASSESSMENT — PATIENT HEALTH QUESTIONNAIRE - PHQ9
SUM OF ALL RESPONSES TO PHQ QUESTIONS 1-9: 0
SUM OF ALL RESPONSES TO PHQ QUESTIONS 1-9: 0
10. IF YOU CHECKED OFF ANY PROBLEMS, HOW DIFFICULT HAVE THESE PROBLEMS MADE IT FOR YOU TO DO YOUR WORK, TAKE CARE OF THINGS AT HOME, OR GET ALONG WITH OTHER PEOPLE: NOT DIFFICULT AT ALL

## 2019-04-01 NOTE — TELEPHONE ENCOUNTER
PHQ-9 SCORE 3/10/2017 1/29/2018 4/1/2019   PHQ-9 Total Score - - -   PHQ-9 Total Score MyChart - - 0   PHQ-9 Total Score 1 4 0     JOANA-7 SCORE 11/18/2014 4/1/2019   Total Score 0 -   Total Score - 1 (minimal anxiety)   Total Score - 1       Filled per protocol.      ROBER TorresN, RN

## 2019-04-02 ASSESSMENT — ANXIETY QUESTIONNAIRES: GAD7 TOTAL SCORE: 1

## 2019-04-02 ASSESSMENT — PATIENT HEALTH QUESTIONNAIRE - PHQ9: SUM OF ALL RESPONSES TO PHQ QUESTIONS 1-9: 0

## 2019-04-04 ENCOUNTER — PRE VISIT (OUTPATIENT)
Dept: UROLOGY | Facility: CLINIC | Age: 54
End: 2019-04-04

## 2019-04-04 NOTE — TELEPHONE ENCOUNTER
MEDICAL RECORDS REQUEST   [facility]  Urology Clinic  [address] [place] [address]  PHONE: [phone]  Fax: [phone]        FUTURE VISIT INFORMATION                                                   Charlene Morales, : 1965 scheduled for future visit at [facility]    APPOINTMENT INFORMATION:    Date: 2019    Provider:  PRADIP MARTINEZ    Reason for Visit/Diagnosis: RECTOCELE    REFERRAL INFORMATION:    Referring provider:  YASMANY ROMAN    Specialty: NP    Referring providers clinic:  [facility] contact number:  [phone];    RECORDS REQUESTED FOR VISIT                                                     NOTES  STATUS/DETAILS   OFFICE NOTE from referring provider  yes   OFFICE NOTE from other specialist  no   DISCHARGE SUMMARY from hospital  no   DISCHARGE REPORT from the ER  no   OPERATIVE REPORT  no   MEDICATION LIST  yes       PRE-VISIT CHECKLIST      Record collection complete Yes   Appointment appropriately scheduled           (right time/right provider) YeS   MyChart activation YES   Questionnaire complete If no, please explain IN PROCESS     Completed by: Nathalie Hughes

## 2019-04-05 ENCOUNTER — OFFICE VISIT (OUTPATIENT)
Dept: UROLOGY | Facility: CLINIC | Age: 54
End: 2019-04-05
Payer: COMMERCIAL

## 2019-04-05 VITALS
WEIGHT: 185.1 LBS | HEART RATE: 85 BPM | DIASTOLIC BLOOD PRESSURE: 107 MMHG | BODY MASS INDEX: 30.84 KG/M2 | SYSTOLIC BLOOD PRESSURE: 147 MMHG | HEIGHT: 65 IN

## 2019-04-05 DIAGNOSIS — N81.6 RECTOCELE: Primary | ICD-10-CM

## 2019-04-05 ASSESSMENT — ENCOUNTER SYMPTOMS
TASTE DISTURBANCE: 0
POSTURAL DYSPNEA: 0
NUMBNESS: 0
EXERCISE INTOLERANCE: 0
BLOATING: 0
SHORTNESS OF BREATH: 0
NECK PAIN: 0
CLAUDICATION: 0
VOMITING: 0
COUGH: 0
NAIL CHANGES: 0
TREMORS: 0
DEPRESSION: 0
BREAST PAIN: 0
HEARTBURN: 0
MUSCLE WEAKNESS: 0
FLANK PAIN: 0
EXTREMITY NUMBNESS: 0
TROUBLE SWALLOWING: 0
SPEECH CHANGE: 0
BLOOD IN STOOL: 0
SYNCOPE: 0
CHILLS: 0
DIARRHEA: 0
POLYDIPSIA: 0
WEIGHT LOSS: 0
WHEEZING: 0
SINUS PAIN: 0
INCREASED ENERGY: 0
LIGHT-HEADEDNESS: 0
HEADACHES: 0
HEMATURIA: 0
ARTHRALGIAS: 0
DIFFICULTY URINATING: 0
STIFFNESS: 0
BOWEL INCONTINENCE: 0
MEMORY LOSS: 0
EYE PAIN: 0
JOINT SWELLING: 0
LEG SWELLING: 0
COUGH DISTURBING SLEEP: 0
SWOLLEN GLANDS: 0
HEMOPTYSIS: 0
ABDOMINAL PAIN: 0
ALTERED TEMPERATURE REGULATION: 0
NECK MASS: 0
SMELL DISTURBANCE: 0
SLEEP DISTURBANCES DUE TO BREATHING: 0
BREAST MASS: 0
WEAKNESS: 0
DECREASED CONCENTRATION: 0
NIGHT SWEATS: 0
DECREASED APPETITE: 0
EYE REDNESS: 0
CONSTIPATION: 0
DYSURIA: 0
RECTAL PAIN: 0
POLYPHAGIA: 0
NAUSEA: 0
DYSPNEA ON EXERTION: 0
SNORES LOUDLY: 0
PALPITATIONS: 0
HOARSE VOICE: 0
SPUTUM PRODUCTION: 0
BACK PAIN: 0
NERVOUS/ANXIOUS: 0
WEIGHT GAIN: 0
EYE IRRITATION: 0
TINGLING: 0
DECREASED LIBIDO: 0
HOT FLASHES: 0
ORTHOPNEA: 0
MUSCLE CRAMPS: 0
INSOMNIA: 0
DOUBLE VISION: 0
DIZZINESS: 0
HALLUCINATIONS: 0
PANIC: 0
PARALYSIS: 0
EYE WATERING: 0
SINUS CONGESTION: 0
LEG PAIN: 0
HYPERTENSION: 0
RESPIRATORY PAIN: 0
RECTAL BLEEDING: 0
SKIN CHANGES: 0
HYPOTENSION: 0
FEVER: 0
LOSS OF CONSCIOUSNESS: 0
FATIGUE: 0
MYALGIAS: 0
BRUISES/BLEEDS EASILY: 0
SORE THROAT: 0
POOR WOUND HEALING: 0
SEIZURES: 0
JAUNDICE: 0
DISTURBANCES IN COORDINATION: 0
TACHYCARDIA: 0

## 2019-04-05 ASSESSMENT — PAIN SCALES - GENERAL: PAINLEVEL: NO PAIN (0)

## 2019-04-05 ASSESSMENT — MIFFLIN-ST. JEOR: SCORE: 1450.49

## 2019-04-05 NOTE — LETTER
4/5/2019     RE: Charlene Morales  [address] [place] [address]     Dear Colleague,    Thank you for referring your patient, Charlene Morales, to the [facility] UROLOGY AND INST FOR PROSTATE AND UROLOGIC CANCERS at [facility]. Please see a copy of my visit note below.    April 5, 2019    Referring Provider: Tania Vázquez, APRN CNP  [address] [place] [address]    Primary Care Provider: Tania Vázquez    CC: rectocele    HPI:  Charlene Morales is a 54 year old female who presents for evaluation of her pelvic floor symptoms.  She has a history of rectocele that has been ongoing for 2 years and not improving after finishing PFPT. She has completed PFPT and found no relief of her symptoms from this. She endorses splinting to have a bowel movement where she places her fingers into her vagina and push. Notes she has to do this with 90% of her bowel movements. Does note increasing stool leakage for ~6-8 months. She notes feeling that she has gas and will accidentally leak stool, also endorses waking up with stool. Notes this is liquid. Otherwise endorses constipation- previous h/o addiction to pain medications. Does not currently have a bowel regimen.     Prolapse:  Do you feel a vaginal bulge? yes                                      Pressure? yes   Do you have to place your fingers in the vagina or in the rectum to have a bowel movement? yes  Impact to quality of life? mod     Stress Incontinence:  Do you leak urine with cough, sneeze, exercise? yes  How often do you leak with cough, sneeze, exercise?  occ- only when sick if significant coughing or jumping rope activity  How much do you usually leak? more   Do you wear a pad? no If so; n/a  Impact to quality of life? minimal    Urge Incontinence:  Do you often get sudden urges to urinate? no  How often do have urges? n/a  If so, do you leak with these urges? n/a  How much do you usually leak?  n/a  Impact to quality of life? n/a    Voids/day:4-6  Nocturia: 2-4  Fluid intake: 32-64oz   Caffeine: 0    Urinating:  Difficulty starting urination or strain to void? no  Weak or intermittent stream? yes  Incomplete emptying or dribbling? yes  Pain or burning with urination? no  Any blood in your urine? no    GI:  Constipation? yes  Frequency stools daily    Straining for stools yes with splinting as above  Stool consistency normal     Ever leak stool (Accidental Bowel Leakage)? yes      If so, how often?               occ- notes this is increasing      If so, do you leak?                   liquid      Soiling without sensation? no  History of irritable bowel or Crohn's? no    Sexual/Pain:  Are you currently having sex?. yes  Pain with sex?   no   Sexual Partner: male  Do any of these symptoms interfere with sex? no  Impact to quality of life? minimal    Prior therapy:  Ever done pelvic floor physical therapy? yes  Trial of medication? no  Have you ever tried a pessary? no    Medical History:  Do you have?   High Cholesterol? yes     Diabetes? no  High Blood pressure? yes     Recurrent UTIs? no  Sleep Apnea? yes  Other medical problems: no    Surgical History:    Hysterectomy? Vaginal without suspension in    Bladder Surgery? no   Other? hemorrhoidectomy     OB/Gyn History:  Pregnancies? 2  Deliveries? 2  Vaginal 2  Section 0  Current birth control? S/p hysterectomy  Periods? S/p hysterectomy  When was the first day of your last period? Prior to hyst in   Last Pap smear? Prior to hyst in  Any abnormal? Possibly as a teenager, normal on repeat  Last mammogram? Last 5 years ago  Last colonoscopy? Due currently, no concerns    Medications/Vitamins/Supplements: see list    Drug Allergies: sulfa    Latex Allergy: no  Iodine Allergy no    Family History: (list relationship and age at diagnosis)  Breast cancer? no   Ovarian cancer? no   Colon cancer? MGF  Other? Mother with skin cancer    Social  History:  Marital status:   Do you/ have you ever smoke(d)  cigarettes? never  Drink more than 1 alcoholic beverage a day?  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 no                   Unusual headaches? no  Leg pain w/ walking/exercise? no                       Easy bruising? Yes- takes excedrin/aspirin with headaches, no family h/o clotting disorders  Difficulty breathing w/ walking/exercise? no  Problems with vision? no  Dizziness, falls, or fainting? no  Excessive bleeding from cuts, gums, surgery? no  Other: no    Past Medical History:   Diagnosis Date     Anemia, unspecified      CONVULSIONS, OTHER 1/8/2006    History of seizure with withdrawal of Butalbital     Depressive disorder, not elsewhere classified      Headache(784.0)      Other and unspecified alcohol dependence, unspecified drinking behavior      Other convulsions      Peptic ulcer, unspecified site, unspecified as acute or chronic, without mention of hemorrhage, perforation, or obstruction      Premenstrual tension syndromes      RENAL CYST 6/30/2005 June 30, 2005 - 3.7cm simple cyst.  Recheck 6 months. January 10, 2006 - Will recheck. 5/06 - stable.     Unspecified essential hypertension      Unspecified gastritis and gastroduodenitis without mention of hemorrhage      Unspecified hemorrhoids without mention of complication        Past Surgical History:   Procedure Laterality Date     HC COLONOSCOPY THRU STOMA, DIAGNOSTIC  5/2/03    Normal. Repeat at age 52     HYSTERECTOMY, PAP NO LONGER INDICATED  2004    Still has ovaries and      SURGICAL HISTORY OF -   8/23/2004    Hemorrhoidectomy & Partial Lateral Sphincterotomy     SURGICAL HISTORY OF -   5/2/2003    Hemorrhoid Banding x2       Social History     Socioeconomic History     Marital status:      Spouse name: Not on file     Number of children: Not on file     Years of education: Not on file      Highest education level: Not on file   Occupational History     Not on file   Social Needs     Financial resource strain: Not on file     Food insecurity:     Worry: Not on file     Inability: Not on file     Transportation needs:     Medical: Not on file     Non-medical: Not on file   Tobacco Use     Smoking status: Never Smoker     Smokeless tobacco: Never Used   Substance and Sexual Activity     Alcohol use: Yes     Comment: 3-4 drinks a week at most- wine      Drug use: No     Sexual activity: Yes     Partners: Male     Birth control/protection: Female Surgical     Comment: hysterectomy   Lifestyle     Physical activity:     Days per week: Not on file     Minutes per session: Not on file     Stress: Not on file   Relationships     Social connections:     Talks on phone: Not on file     Gets together: Not on file     Attends Jehovah's witness service: Not on file     Active member of club or organization: Not on file     Attends meetings of clubs or organizations: Not on file     Relationship status: Not on file     Intimate partner violence:     Fear of current or ex partner: Not on file     Emotionally abused: Not on file     Physically abused: Not on file     Forced sexual activity: Not on file   Other Topics Concern     Parent/sibling w/ CABG, MI or angioplasty before 65F 55M? Yes   Social History Narrative     Not on file       Family History   Problem Relation Age of Onset     Arthritis Mother      Hypertension Mother      Heart Disease Father      Diabetes Father      Lipids Father      Alcohol/Drug Father      C.A.D. Father         open heart surgery at age 50     Cerebrovascular Disease Father      Abdominal Aortic Aneurysm Father      Hypertension Maternal Grandfather      Alzheimer Disease Maternal Grandfather      Colon Cancer Maternal Grandfather         age 65     Heart Disease Paternal Grandfather      Thyroid Disease Sister      Hypertension Sister      Hypertension Sister      Breast Cancer No family hx  of        Review of Systems     Constitutional:  Negative for fever, chills, weight loss, weight gain, fatigue, decreased appetite, night sweats, recent stressors, height gain, height loss, post-operative complications, incisional pain, hallucinations, increased energy, hyperactivity and confused.   HENT:  Negative for ear pain, hearing loss, tinnitus, nosebleeds, trouble swallowing, hoarse voice, mouth sores, sore throat, ear discharge, tooth pain, gum tenderness, taste disturbance, smell disturbance, hearing aid, bleeding gums, dry mouth, sinus pain, sinus congestion and neck mass.    Eyes:  Negative for double vision, pain, redness, eye pain, decreased vision, eye watering, eye bulging, eye dryness, flashing lights, spots, floaters, strabismus, tunnel vision, jaundice and eye irritation.   Respiratory:   Negative for cough, hemoptysis, sputum production, shortness of breath, wheezing, sleep disturbances due to breathing, snores loudly, respiratory pain, dyspnea on exertion, cough disturbing sleep and postural dyspnea.    Cardiovascular:  Negative for chest pain, dyspnea on exertion, palpitations, orthopnea, claudication, leg swelling, fingers/toes turn blue, hypertension, hypotension, syncope, history of heart murmur, chest pain on exertion, chest pain at rest, pacemaker, few scattered varicosities, leg pain, sleep disturbances due to breathing, tachycardia, light-headedness, exercise intolerance and edema.   Gastrointestinal:  Negative for heartburn, nausea, vomiting, abdominal pain, diarrhea, constipation, blood in stool, melena, rectal pain, bloating, hemorrhoids, bowel incontinence, jaundice, rectal bleeding, coffee ground emesis and change in stool.   Genitourinary:  Negative for bladder incontinence, dysuria, urgency, hematuria, flank pain, vaginal discharge, difficulty urinating, genital sores, dyspareunia, decreased libido, nocturia, voiding less frequently, arousal difficulty, abnormal vaginal bleeding,  "excessive menstruation, menstrual changes, hot flashes, vaginal dryness and postmenopausal bleeding.   Musculoskeletal:  Negative for myalgias, back pain, joint swelling, arthralgias, stiffness, muscle cramps, neck pain, bone pain, muscle weakness and fracture.   Skin:  Negative for nail changes, itching, poor wound healing, rash, hair changes, skin changes, acne, warts, poor wound healing, scarring, flaky skin, Raynaud's phenomenon, sensitivity to sunlight and skin thickening.   Neurological:  Negative for dizziness, tingling, tremors, speech change, seizures, loss of consciousness, weakness, light-headedness, numbness, headaches, disturbances in coordination, extremity numbness, memory loss, difficulty walking and paralysis.   Endo/Heme:  Negative for anemia, swollen glands and bruises/bleeds easily.   Psychiatric/Behavioral:  Negative for depression, hallucinations, memory loss, decreased concentration, mood swings and panic attacks.    Breast:  Negative for breast discharge, breast mass, breast pain and nipple retraction.   Endocrine:  Negative for altered temperature regulation, polyphagia, polydipsia, unwanted hair growth and change in facial hair.      Allergies   Allergen Reactions     Sulfa Drugs        Current Outpatient Medications   Medication     atorvastatin (LIPITOR) 40 MG tablet     chlorthalidone (HYGROTON) 25 MG tablet     chlorthalidone (HYGROTON) 25 MG tablet     chlorthalidone (HYGROTON) 25 MG tablet     IBUPROFEN 200 MG OR TABS     lisinopril (PRINIVIL/ZESTRIL) 40 MG tablet     omeprazole (PRILOSEC) 20 MG CR capsule     PARoxetine (PAXIL) 40 MG tablet     sucralfate (CARAFATE) 1 GM tablet     traZODone (DESYREL) 100 MG tablet     No current facility-administered medications for this visit.        BP (!) 147/107   Pulse 85   Ht 1.651 m (5' 5\")   Wt 84 kg (185 lb 1.6 oz)   BMI 30.80 kg/m    No LMP recorded. Patient has had a hysterectomy. Body mass index is 30.8 kg/m .  She is alert, " comfortable in no acute distress, non-labored breathing.   Abdomen is soft, non-tender, non-distended, no CVAT.    Normal external female genitalia. The urethra was hypermobile, but otherwise normal in appearance.    She has poor support on supine strain.  Speculum and bimanual exam are remarkable for cystocele and rectocele.      3/5 kegels.    Rectal exam with normal tone, no masses or tenderness.    POPQ  Aa -1   Ba -1  C -8  D n/a  GH 4  PB 3  TVL 10  Ap 0  Bp 0    neg SST  VOID 50 ml  PVR 84 mL by bladder scan    A/P: Charlene Morales is a 54 year old F with cystocele, rectocele and TONI. Denies that the TONI symptoms are bothersome to her currently and only flare when she has an illness with coughing or significant activity such as jumping rope. We discussed with her prolapse management options include doing nothing, pessary (patient is not currently interested in this and unlikely to help much with a rectocele) or surgical management. For her would recommend posterior repair and cystoscopy. We discussed surgical risks include but are not limited to bleeding, infection, or damage to nearby structures including bowel and bladder. Also noted possibility of recurrence of her prolapse is possible.   Regarding her anal leakage, note this would not be improved by surgery and recommend fiber supplementation for this.   Patient was given AUGS POPQ, pelvic organ prolapse, TONI, PFPT, and accidental anal leakage handouts. She vocalized understanding of the risks and benefits and desires to proceed with surgical management.     -Posterior repair with cystoscopy  -start fiber supplement    A total of 40 minutes were spent with the patient today, > 50% in counseling and coordination of care    Brandon Pimentel MD  Professor, OB/GYN  Urogynecologist  CC  Patient Care Team:  Tania Vázquez APRN CNP as PCP - General (Family Practice)  Tania Vázquez APRN CNP as Assigned PCP  Brandon Pimentel MD as MD  (OB/Gyn)  Lolis Jimenez, YASMANY POWELL

## 2019-04-05 NOTE — PROGRESS NOTES
April 5, 2019    Referring Provider: NADINE [facility]  [address] [place] [address]    Primary Care Provider: Tania Vázquez    CC: rectocele    HPI:  Charlene Morales is a 54 year old female who presents for evaluation of her pelvic floor symptoms.  She has a history of rectocele that has been ongoing for 2 years and not improving after finishing PFPT. She has completed PFPT and found no relief of her symptoms from this. She endorses splinting to have a bowel movement where she places her fingers into her vagina and push. Notes she has to do this with 90% of her bowel movements. Does note increasing stool leakage for ~6-8 months. She notes feeling that she has gas and will accidentally leak stool, also endorses waking up with stool. Notes this is liquid. Otherwise endorses constipation- previous h/o addiction to pain medications. Does not currently have a bowel regimen.     Prolapse:  Do you feel a vaginal bulge? yes                                      Pressure? yes   Do you have to place your fingers in the vagina or in the rectum to have a bowel movement? yes  Impact to quality of life? mod     Stress Incontinence:  Do you leak urine with cough, sneeze, exercise? yes  How often do you leak with cough, sneeze, exercise?  occ- only when sick if significant coughing or jumping rope activity  How much do you usually leak? more   Do you wear a pad? no If so; n/a  Impact to quality of life? minimal    Urge Incontinence:  Do you often get sudden urges to urinate? no  How often do have urges? n/a  If so, do you leak with these urges? n/a  How much do you usually leak? n/a  Impact to quality of life? n/a    Voids/day:4-6  Nocturia: 2-4  Fluid intake: 32-64oz   Caffeine: 0    Urinating:  Difficulty starting urination or strain to void? no  Weak or intermittent stream? yes  Incomplete emptying or dribbling? yes  Pain or burning with urination? no  Any blood in your urine?  no    GI:  Constipation? yes  Frequency stools daily    Straining for stools yes with splinting as above  Stool consistency normal     Ever leak stool (Accidental Bowel Leakage)? yes      If so, how often?               occ- notes this is increasing      If so, do you leak?                   liquid      Soiling without sensation? no  History of irritable bowel or Crohn's? no    Sexual/Pain:  Are you currently having sex?. yes  Pain with sex?   no   Sexual Partner: male  Do any of these symptoms interfere with sex? no  Impact to quality of life? minimal    Prior therapy:  Ever done pelvic floor physical therapy? yes  Trial of medication? no  Have you ever tried a pessary? no    Medical History:  Do you have?   High Cholesterol? yes     Diabetes? no  High Blood pressure? yes     Recurrent UTIs? no  Sleep Apnea? yes  Other medical problems: no    Surgical History:    Hysterectomy? Vaginal without suspension in    Bladder Surgery? no   Other? hemorrhoidectomy     OB/Gyn History:  Pregnancies? 2  Deliveries? 2  Vaginal 2  Section 0  Current birth control? S/p hysterectomy  Periods? S/p hysterectomy  When was the first day of your last period? Prior to hyst in   Last Pap smear? Prior to hyst in  Any abnormal? Possibly as a teenager, normal on repeat  Last mammogram? Last 5 years ago  Last colonoscopy? Due currently, no concerns    Medications/Vitamins/Supplements: see list    Drug Allergies: sulfa    Latex Allergy: no  Iodine Allergy no    Family History: (list relationship and age at diagnosis)  Breast cancer? no   Ovarian cancer? no   Colon cancer? MGF  Other? Mother with skin cancer    Social History:  Marital status:   Do you/ have you ever smoke(d)  cigarettes? never  Drink more than 1 alcoholic beverage a day?  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 no                   Unusual headaches? no  Leg pain w/  walking/exercise? no                       Easy bruising? Yes- takes excedrin/aspirin with headaches, no family h/o clotting disorders  Difficulty breathing w/ walking/exercise? no  Problems with vision? no  Dizziness, falls, or fainting? no  Excessive bleeding from cuts, gums, surgery? no  Other: no    Past Medical History:   Diagnosis Date     Anemia, unspecified      CONVULSIONS, OTHER 1/8/2006    History of seizure with withdrawal of Butalbital     Depressive disorder, not elsewhere classified      Headache(784.0)      Other and unspecified alcohol dependence, unspecified drinking behavior      Other convulsions      Peptic ulcer, unspecified site, unspecified as acute or chronic, without mention of hemorrhage, perforation, or obstruction      Premenstrual tension syndromes      RENAL CYST 6/30/2005 June 30, 2005 - 3.7cm simple cyst.  Recheck 6 months. January 10, 2006 - Will recheck. 5/06 - stable.     Unspecified essential hypertension      Unspecified gastritis and gastroduodenitis without mention of hemorrhage      Unspecified hemorrhoids without mention of complication        Past Surgical History:   Procedure Laterality Date     HC COLONOSCOPY THRU STOMA, DIAGNOSTIC  5/2/03    Normal. Repeat at age 52     HYSTERECTOMY, PAP NO LONGER INDICATED  2004    Still has ovaries and      SURGICAL HISTORY OF -   8/23/2004    Hemorrhoidectomy & Partial Lateral Sphincterotomy     SURGICAL HISTORY OF -   5/2/2003    Hemorrhoid Banding x2       Social History     Socioeconomic History     Marital status:      Spouse name: Not on file     Number of children: Not on file     Years of education: Not on file     Highest education level: Not on file   Occupational History     Not on file   Social Needs     Financial resource strain: Not on file     Food insecurity:     Worry: Not on file     Inability: Not on file     Transportation needs:     Medical: Not on file     Non-medical: Not on file   Tobacco Use      Smoking status: Never Smoker     Smokeless tobacco: Never Used   Substance and Sexual Activity     Alcohol use: Yes     Comment: 3-4 drinks a week at most- wine      Drug use: No     Sexual activity: Yes     Partners: Male     Birth control/protection: Female Surgical     Comment: hysterectomy   Lifestyle     Physical activity:     Days per week: Not on file     Minutes per session: Not on file     Stress: Not on file   Relationships     Social connections:     Talks on phone: Not on file     Gets together: Not on file     Attends Gnosticist service: Not on file     Active member of club or organization: Not on file     Attends meetings of clubs or organizations: Not on file     Relationship status: Not on file     Intimate partner violence:     Fear of current or ex partner: Not on file     Emotionally abused: Not on file     Physically abused: Not on file     Forced sexual activity: Not on file   Other Topics Concern     Parent/sibling w/ CABG, MI or angioplasty before 65F 55M? Yes   Social History Narrative     Not on file       Family History   Problem Relation Age of Onset     Arthritis Mother      Hypertension Mother      Heart Disease Father      Diabetes Father      Lipids Father      Alcohol/Drug Father      C.A.D. Father         open heart surgery at age 50     Cerebrovascular Disease Father      Abdominal Aortic Aneurysm Father      Hypertension Maternal Grandfather      Alzheimer Disease Maternal Grandfather      Colon Cancer Maternal Grandfather         age 65     Heart Disease Paternal Grandfather      Thyroid Disease Sister      Hypertension Sister      Hypertension Sister      Breast Cancer No family hx of        Review of Systems     Constitutional:  Negative for fever, chills, weight loss, weight gain, fatigue, decreased appetite, night sweats, recent stressors, height gain, height loss, post-operative complications, incisional pain, hallucinations, increased energy, hyperactivity and confused.    HENT:  Negative for ear pain, hearing loss, tinnitus, nosebleeds, trouble swallowing, hoarse voice, mouth sores, sore throat, ear discharge, tooth pain, gum tenderness, taste disturbance, smell disturbance, hearing aid, bleeding gums, dry mouth, sinus pain, sinus congestion and neck mass.    Eyes:  Negative for double vision, pain, redness, eye pain, decreased vision, eye watering, eye bulging, eye dryness, flashing lights, spots, floaters, strabismus, tunnel vision, jaundice and eye irritation.   Respiratory:   Negative for cough, hemoptysis, sputum production, shortness of breath, wheezing, sleep disturbances due to breathing, snores loudly, respiratory pain, dyspnea on exertion, cough disturbing sleep and postural dyspnea.    Cardiovascular:  Negative for chest pain, dyspnea on exertion, palpitations, orthopnea, claudication, leg swelling, fingers/toes turn blue, hypertension, hypotension, syncope, history of heart murmur, chest pain on exertion, chest pain at rest, pacemaker, few scattered varicosities, leg pain, sleep disturbances due to breathing, tachycardia, light-headedness, exercise intolerance and edema.   Gastrointestinal:  Negative for heartburn, nausea, vomiting, abdominal pain, diarrhea, constipation, blood in stool, melena, rectal pain, bloating, hemorrhoids, bowel incontinence, jaundice, rectal bleeding, coffee ground emesis and change in stool.   Genitourinary:  Negative for bladder incontinence, dysuria, urgency, hematuria, flank pain, vaginal discharge, difficulty urinating, genital sores, dyspareunia, decreased libido, nocturia, voiding less frequently, arousal difficulty, abnormal vaginal bleeding, excessive menstruation, menstrual changes, hot flashes, vaginal dryness and postmenopausal bleeding.   Musculoskeletal:  Negative for myalgias, back pain, joint swelling, arthralgias, stiffness, muscle cramps, neck pain, bone pain, muscle weakness and fracture.   Skin:  Negative for nail changes,  "itching, poor wound healing, rash, hair changes, skin changes, acne, warts, poor wound healing, scarring, flaky skin, Raynaud's phenomenon, sensitivity to sunlight and skin thickening.   Neurological:  Negative for dizziness, tingling, tremors, speech change, seizures, loss of consciousness, weakness, light-headedness, numbness, headaches, disturbances in coordination, extremity numbness, memory loss, difficulty walking and paralysis.   Endo/Heme:  Negative for anemia, swollen glands and bruises/bleeds easily.   Psychiatric/Behavioral:  Negative for depression, hallucinations, memory loss, decreased concentration, mood swings and panic attacks.    Breast:  Negative for breast discharge, breast mass, breast pain and nipple retraction.   Endocrine:  Negative for altered temperature regulation, polyphagia, polydipsia, unwanted hair growth and change in facial hair.      Allergies   Allergen Reactions     Sulfa Drugs        Current Outpatient Medications   Medication     atorvastatin (LIPITOR) 40 MG tablet     chlorthalidone (HYGROTON) 25 MG tablet     chlorthalidone (HYGROTON) 25 MG tablet     chlorthalidone (HYGROTON) 25 MG tablet     IBUPROFEN 200 MG OR TABS     lisinopril (PRINIVIL/ZESTRIL) 40 MG tablet     omeprazole (PRILOSEC) 20 MG CR capsule     PARoxetine (PAXIL) 40 MG tablet     sucralfate (CARAFATE) 1 GM tablet     traZODone (DESYREL) 100 MG tablet     No current facility-administered medications for this visit.        BP (!) 147/107   Pulse 85   Ht 1.651 m (5' 5\")   Wt 84 kg (185 lb 1.6 oz)   BMI 30.80 kg/m   No LMP recorded. Patient has had a hysterectomy. Body mass index is 30.8 kg/m .  She is alert, comfortable in no acute distress, non-labored breathing.   Abdomen is soft, non-tender, non-distended, no CVAT.    Normal external female genitalia. The urethra was hypermobile, but otherwise normal in appearance.    She has poor support on supine strain.  Speculum and bimanual exam are remarkable for " cystocele and rectocele.      3/5 kegels.    Rectal exam with normal tone, no masses or tenderness.    POPQ  Aa -1   Ba -1  C -8  D n/a  GH 4  PB 3  TVL 10  Ap 0  Bp 0    neg SST  VOID 50 ml  PVR 84 mL by bladder scan    A/P: Charlene Morales is a 54 year old F with cystocele, rectocele and TONI. Denies that the TONI symptoms are bothersome to her currently and only flare when she has an illness with coughing or significant activity such as jumping rope. We discussed with her prolapse management options include doing nothing, pessary (patient is not currently interested in this and unlikely to help much with a rectocele) or surgical management. For her would recommend posterior repair and cystoscopy. We discussed surgical risks include but are not limited to bleeding, infection, or damage to nearby structures including bowel and bladder. Also noted possibility of recurrence of her prolapse is possible.   Regarding her anal leakage, note this would not be improved by surgery and recommend fiber supplementation for this.   Patient was given AUGS POPQ, pelvic organ prolapse, TONI, PFPT, and accidental anal leakage handouts. She vocalized understanding of the risks and benefits and desires to proceed with surgical management.     -Posterior repair with cystoscopy  -start fiber supplement      A total of 40 minutes were spent with the patient today, > 50% in counseling and coordination of care    Brandon Pimentel MD  Professor, OB/GYN  Urogynecologist  CC  Patient Care Team:  Yasmany Vázquez APRN CNP as PCP - General (Family Practice)  Yasmany Vázquez APRN CNP as Assigned PCP  Brandon Pimentel MD as MD (OB/Gyn)  Lolis Jimenez, YASMANY POWELL

## 2019-04-05 NOTE — NURSING NOTE
Patient tested negative for urinary stress incontinence  Voided 50 ml   Bladder scan  Showed 81 residual urine.

## 2019-04-08 RX ORDER — CEFAZOLIN SODIUM 1 G/50ML
1 INJECTION, SOLUTION INTRAVENOUS SEE ADMIN INSTRUCTIONS
Status: CANCELLED | OUTPATIENT
Start: 2019-04-08

## 2019-04-08 RX ORDER — PHENAZOPYRIDINE HYDROCHLORIDE 200 MG/1
200 TABLET, FILM COATED ORAL ONCE
Status: CANCELLED | OUTPATIENT
Start: 2019-04-08 | End: 2019-04-08

## 2019-04-08 RX ORDER — CEFAZOLIN SODIUM 2 G/50ML
2 SOLUTION INTRAVENOUS
Status: CANCELLED | OUTPATIENT
Start: 2019-04-08

## 2019-04-09 ENCOUNTER — TELEPHONE (OUTPATIENT)
Dept: OBGYN | Facility: CLINIC | Age: 54
End: 2019-04-09

## 2019-04-17 ENCOUNTER — TELEPHONE (OUTPATIENT)
Dept: OBGYN | Facility: CLINIC | Age: 54
End: 2019-04-17

## 2019-04-17 NOTE — TELEPHONE ENCOUNTER
Confirmed surgery date time and location 7/29/19 with arrival time at 9:30a.m, surgery will be done at [facility], mailed out patient information, nothing to eat eight hours before scheduled surgery time and clear liquids up to two hours before scheduled surgery time.

## 2019-04-19 DIAGNOSIS — F51.01 PRIMARY INSOMNIA: ICD-10-CM

## 2019-04-19 RX ORDER — TRAZODONE HYDROCHLORIDE 100 MG/1
TABLET ORAL
Qty: 60 TABLET | Refills: 8 | Status: SHIPPED | OUTPATIENT
Start: 2019-04-19 | End: 2020-01-17

## 2019-04-19 NOTE — TELEPHONE ENCOUNTER
"Requested Prescriptions   Pending Prescriptions Disp Refills     traZODone (DESYREL) 100 MG tablet [Pharmacy Med Name: TRAZODONE HCL 100MG TABS]  Last Written Prescription Date:  07/24/2018 #60 x 8  Last filled 02/11/2019  Last office visit: 1/21/2019 AISHA Vázquez   Future Office Visit:  None   60 tablet 8     Sig: TAKE ONE OR TWO TABLETS BY MOUTH EVERY NIGHT AT BEDTIME       Serotonin Modulators Passed - 4/19/2019  9:07 AM        Passed - Recent (12 mo) or future (30 days) visit within the authorizing provider's specialty     Patient had office visit in the last 12 months or has a visit in the next 30 days with authorizing provider or within the authorizing provider's specialty.  See \"Patient Info\" tab in inbasket, or \"Choose Columns\" in Meds & Orders section of the refill encounter.              Passed - Medication is active on med list        Passed - Patient is age 18 or older        Passed - No active pregnancy on record        Passed - No positive pregnancy test in past 12 months          "

## 2019-04-19 NOTE — TELEPHONE ENCOUNTER
Pres[place] [facility] [place].  Sonya Hopkins RN

## 2019-04-25 ENCOUNTER — MYC MEDICAL ADVICE (OUTPATIENT)
Dept: FAMILY MEDICINE | Facility: CLINIC | Age: 54
End: 2019-04-25

## 2019-04-25 DIAGNOSIS — F41.8 SITUATIONAL ANXIETY: Primary | ICD-10-CM

## 2019-04-26 RX ORDER — LORAZEPAM 0.5 MG/1
0.5 TABLET ORAL EVERY 8 HOURS PRN
Qty: 30 TABLET | Refills: 0 | Status: SHIPPED | OUTPATIENT
Start: 2019-04-26 | End: 2019-07-31

## 2019-05-01 NOTE — TELEPHONE ENCOUNTER
Prescription approved per [facility] [place] or patient Primary care provider (PCP)  LAUREN Holliday RN/Marc Briscoe         FIO2 decreased to 40% on vent.

## 2019-05-10 DIAGNOSIS — K27.9 PEPTIC ULCER: ICD-10-CM

## 2019-05-10 NOTE — TELEPHONE ENCOUNTER
"Requested Prescriptions   Pending Prescriptions Disp Refills     omeprazole (PRILOSEC) 20 MG DR capsule [Pharmacy Med Name: OMEPRAZOLE 20MG CPDR]  Last Written Prescription Date:  10/22/2018 #180 x 1  Last filled 01/17/2019  Last office visit: 1/21/2019 AISHA Vázquez   Future Office Visit:  None   180 capsule 1     Sig: TAKE TWO CAPSULES (40MG)  BY MOUTH EVERY DAY       PPI Protocol Passed - 5/10/2019  8:31 AM        Passed - Not on Clopidogrel (unless Pantoprazole ordered)        Passed - No diagnosis of osteoporosis on record        Passed - Recent (12 mo) or future (30 days) visit within the authorizing provider's specialty     Patient had office visit in the last 12 months or has a visit in the next 30 days with authorizing provider or within the authorizing provider's specialty.  See \"Patient Info\" tab in inbasket, or \"Choose Columns\" in Meds & Orders section of the refill encounter.              Passed - Medication is active on med list        Passed - Patient is age 18 or older        Passed - No active pregnacy on record        Passed - No positive pregnancy test in past 12 months            "

## 2019-05-20 DIAGNOSIS — I10 HYPERTENSION, BENIGN ESSENTIAL, GOAL BELOW 140/90: ICD-10-CM

## 2019-05-21 RX ORDER — LISINOPRIL 40 MG/1
TABLET ORAL
Qty: 90 TABLET | Refills: 3 | Status: SHIPPED | OUTPATIENT
Start: 2019-05-21 | End: 2020-05-11

## 2019-05-21 NOTE — TELEPHONE ENCOUNTER
Routing refill request to provider for review/approval because:  Blood pressure not in range.    Lolis Montgomery RN

## 2019-05-21 NOTE — TELEPHONE ENCOUNTER
"Requested Prescriptions   Pending Prescriptions Disp Refills     lisinopril (PRINIVIL/ZESTRIL) 40 MG tablet [Pharmacy Med Name: LISINOPRIL 40MG TABS] 90 tablet 3     Sig: TAKE ONE TABLET BY MOUTH EVERY DAY  Last Written Prescription Date:  04/10/2018 #90 x 3  Last filled 02/11/2019  Last office visit: 1/21/2019 AISHA Vázquez   Future Office Visit:  None         ACE Inhibitors (Including Combos) Protocol Failed - 5/20/2019 12:23 PM        Failed - Blood pressure under 140/90 in past 12 months     BP Readings from Last 3 Encounters:   04/05/19 (!) 147/107   01/21/19 128/82   11/23/18 130/88                 Failed - Normal serum creatinine on file in past 12 months     Recent Labs   Lab Test 03/10/17  0844   CR 0.92             Failed - Normal serum potassium on file in past 12 months     Recent Labs   Lab Test 03/10/17  0844   POTASSIUM 3.8             Passed - Recent (12 mo) or future (30 days) visit within the authorizing provider's specialty     Patient had office visit in the last 12 months or has a visit in the next 30 days with authorizing provider or within the authorizing provider's specialty.  See \"Patient Info\" tab in inbasket, or \"Choose Columns\" in Meds & Orders section of the refill encounter.              Passed - Medication is active on med list        Passed - Patient is age 18 or older        Passed - No active pregnancy on record        Passed - No positive pregnancy test within past 12 months          "

## 2019-05-22 ENCOUNTER — TELEPHONE (OUTPATIENT)
Dept: LAB | Facility: CLINIC | Age: 54
End: 2019-05-22

## 2019-05-22 DIAGNOSIS — I10 HYPERTENSION, BENIGN ESSENTIAL, GOAL BELOW 140/90: ICD-10-CM

## 2019-05-22 DIAGNOSIS — E78.5 HYPERLIPIDEMIA LDL GOAL <130: Primary | ICD-10-CM

## 2019-05-22 NOTE — TELEPHONE ENCOUNTER
Labs on Charlene Morales has  and a letter was sent on 19 . Please contact the patient. Thank you!

## 2019-05-30 ENCOUNTER — TELEPHONE (OUTPATIENT)
Dept: FAMILY MEDICINE | Facility: CLINIC | Age: 54
End: 2019-05-30

## 2019-05-30 PROBLEM — M75.21 BICEPS TENDONITIS, RIGHT: Status: RESOLVED | Noted: 2019-02-13 | Resolved: 2019-05-30

## 2019-05-30 NOTE — LETTER
May 31, 2019      Charlene Morales  [address]  [place] [address]      Dear Charlene Morales    We care about your health and have reviewed your health plan including your medical conditions, medication list, and lab results.  Based on this review, it is recommended that you follow up regarding the following health topic(s):     -High Blood Pressure  -Breast Cancer Screening  -Colon Cancer Screening  -Wellness (Physical) Visit     We recommend you take the following action(s):    -- Tetanus vaccine. The last tetanus vaccine that we have on file for you is 10/09/2007. This vaccine is good for 10 years which means yours  in 10/2017. Please call to schedule a nurse appointment to update this vaccine or, if it is more convenient for you, our pharmacy takes walk ins for this also.    -- Blood pressure check. We have noticed that your blood pressure was elevated at a recent visit. We want to be sure your medication is working adequately for you. Please call to schedule a nurse visit to have your blood pressure checked or if it is more convenient for you, the pharmacy takes walk in blood pressure checks. Either of these choices are at no charge to you.     -- Physical. Please call our clinic to schedule your physical appointment. Please plan to be fasting for 8 to 10 hours prior to this appointment (nothing to eat or drink except water and medications).     -- Mammogram. The last mammogram that we have on file for you was from 2010.   Please call one of the following numbers to schedule:  ** [facility] [phone] (at [facility] once a month)  [facility] [phone]  [facility] [phone]  [facility] [phone]   of [facility] [phone]  [facility] [phone]    -- Colon screen. Colonoscopy or FIT test (take home test). One of these tests is recommended at age 50 to screen for colon cancer. The last colonoscopy/FIT that we have on file for  you was from 05/02/2003.   Please call one of the following numbers to schedule a colonoscopy:  [facility] [phone]  [facility] [phone]  [facility] [phone]  [facility] [phone] (multiple sites, call for locations)  OR....  If you prefer to do a screening that is LESS INVASIVE AND LESS EXPENSIVE there is an test for you! It is called the FIT test. It is a screening test that is done yearly and can be DONE AT HOME! Do the test at home and mail it in (you don't even have to pay for postage). If you are willing to do this test, we can order the kit for you to  at our clinic. Please call us at [phone] if you need an order for a colonoscopy or FIT testing.    Please call us at [phone] (or use [employer]) to address the above recommendations.     Thank you for trusting [facility] and we appreciate the opportunity to serve you.  We look forward to supporting your healthcare needs in the future.    Healthy Regards,      Your Health Care Team  [facility]

## 2019-05-30 NOTE — TELEPHONE ENCOUNTER
Panel Management Review      Patient has the following on her problem list:     Depression / Dysthymia review    Patient is due for:  None    Hypertension   Last three blood pressure readings:  BP Readings from Last 3 Encounters:   04/05/19 (!) 147/107   01/21/19 128/82   11/23/18 130/88     Blood pressure: FAILED    HTN Guidelines:  Less than 140/90      Composite cancer screening  Chart review shows that this patient is due/due soon for the following Mammogram and Colonoscopy  Summary:    Patient is due/failing the following:   BP CHECK; COLONOSCOPY; MAMMOGRAM; PE WITH FASTING LABS; TETANUS    Action needed:   Patient needs office visit for PE with FASTING LABS, Patient needs nurse only visit for: BP CHECK and UPDATED TETANUS and Patient needs referral/order: MAMMOGRAM and COLONOSCOPY.    Type of outreach:    Sent letter.    Questions for provider review:    None                                                                                                                                    Meghan Britt CMA (AAMA)       Chart routed to None .

## 2019-06-06 DIAGNOSIS — E78.5 HYPERLIPIDEMIA LDL GOAL <130: ICD-10-CM

## 2019-06-06 NOTE — TELEPHONE ENCOUNTER
"Requested Prescriptions   Pending Prescriptions Disp Refills     atorvastatin (LIPITOR) 40 MG tablet [Pharmacy Med Name: ATORVASTATIN CALCIUM 40MG TABS] 90 tablet 3     Sig: TAKE ONE TABLET BY MOUTH EVERY DAY  Last Written Prescription Date:  04/10/2018 #90 x 3  Last filled 01/17/2019  Last office visit: 1/21/2019 AISHA Vázquez   Future Office Visit:  None         Statins Protocol Failed - 6/6/2019  9:07 AM        Failed - LDL on file in past 12 months     Recent Labs   Lab Test 03/10/17  0844   *             Passed - No abnormal creatine kinase in past 12 months     No lab results found.             Passed - Recent (12 mo) or future (30 days) visit within the authorizing provider's specialty     Patient had office visit in the last 12 months or has a visit in the next 30 days with authorizing provider or within the authorizing provider's specialty.  See \"Patient Info\" tab in inbasket, or \"Choose Columns\" in Meds & Orders section of the refill encounter.              Passed - Medication is active on med list        Passed - Patient is age 18 or older        Passed - No active pregnancy on record        Passed - No positive pregnancy test in past 12 months          "

## 2019-06-07 RX ORDER — ATORVASTATIN CALCIUM 40 MG/1
40 TABLET, FILM COATED ORAL DAILY
Qty: 30 TABLET | Refills: 0 | Status: SHIPPED | OUTPATIENT
Start: 2019-06-07 | End: 2019-07-30

## 2019-06-07 NOTE — TELEPHONE ENCOUNTER
Routing refill request to provider for review/approval because:  Labs not current:  DEVANG Hopkins RN

## 2019-07-31 DIAGNOSIS — F41.8 SITUATIONAL ANXIETY: ICD-10-CM

## 2019-07-31 RX ORDER — LORAZEPAM 0.5 MG/1
0.5 TABLET ORAL EVERY 8 HOURS PRN
Qty: 30 TABLET | Refills: 0 | Status: SHIPPED | OUTPATIENT
Start: 2019-07-31 | End: 2019-11-06

## 2019-07-31 NOTE — TELEPHONE ENCOUNTER
Lorazepam 0.5mg      Last Written Prescription Date:  4/26/19  Last Fill Quantity: 30,   # refills: 0  Last Office Visit: 7/22/19  Future Office visit:       Routing refill request to provider for review/approval because:  Drug not on the [facility], [facility] or [facility] refill protocol or controlled substance      Sandi Yan CPhT  [id number] [address] [place] [address]  Phone: [phone]  Fax: [phone]

## 2019-08-12 DIAGNOSIS — I10 HYPERTENSION, BENIGN ESSENTIAL, GOAL BELOW 140/90: ICD-10-CM

## 2019-08-12 NOTE — TELEPHONE ENCOUNTER
"Requested Prescriptions   Pending Prescriptions Disp Refills     chlorthalidone (HYGROTON) 25 MG tablet [Pharmacy Med Name: CHLORTHALIDONE 25MG TABS] 90 tablet 1     Sig: TAKE ONE TABLET BY MOUTH ONCE DAILY  Last Written Prescription Date:  01/21/2019 #90 x 1  Last filled 05/10/2019   Last office visit: 07/22/2019 AISHA Vázquez   Future Office Visit:  None         Diuretics (Including Combos) Protocol Failed - 8/12/2019 11:41 AM        Failed - Blood pressure under 140/90 in past 12 months     BP Readings from Last 3 Encounters:   04/05/19 (!) 147/107   01/21/19 128/82   11/23/18 130/88                 Failed - Normal serum creatinine on file in past 12 months     Recent Labs   Lab Test 03/10/17  0844   CR 0.92              Failed - Normal serum potassium on file in past 12 months     Recent Labs   Lab Test 03/10/17  0844   POTASSIUM 3.8                    Failed - Normal serum sodium on file in past 12 months     Recent Labs   Lab Test 03/10/17  0844   *              Passed - Recent (12 mo) or future (30 days) visit within the authorizing provider's specialty     Patient had office visit in the last 12 months or has a visit in the next 30 days with authorizing provider or within the authorizing provider's specialty.  See \"Patient Info\" tab in inbasket, or \"Choose Columns\" in Meds & Orders section of the refill encounter.              Passed - Medication is active on med list        Passed - Patient is age 18 or older        Passed - No active pregancy on record        Passed - No positive pregnancy test in past 12 months          "

## 2019-08-13 NOTE — TELEPHONE ENCOUNTER
Routing refill request to provider for review/approval because:  Labs not current  Andreea Maya RN

## 2019-08-14 NOTE — TELEPHONE ENCOUNTER
Pt is out of this medication. Please send new RX.         Thank You,  Chai Ding, [facility]  On behalf of [facility]

## 2019-08-15 RX ORDER — CHLORTHALIDONE 25 MG/1
TABLET ORAL
Qty: 90 TABLET | Refills: 1 | Status: SHIPPED | OUTPATIENT
Start: 2019-08-15 | End: 2020-02-13

## 2019-08-23 DIAGNOSIS — E78.5 HYPERLIPIDEMIA LDL GOAL <130: ICD-10-CM

## 2019-08-23 NOTE — TELEPHONE ENCOUNTER
"Requested Prescriptions   Pending Prescriptions Disp Refills     atorvastatin (LIPITOR) 40 MG tablet [Pharmacy Med Name: ATORVASTATIN CALCIUM 40MG TABS]  Last Written Prescription Date:  7/31/19  Last Fill Quantity: 90,  # refills: 0   Last office visit: 1/21/2019 with prescribing provider:  sangeetha   Future Office Visit:   Next 5 appointments (look out 90 days)    Sep 09, 2019  3:20 PM CDT  MyChart Physical Adult with NADINE Cooper CNP  [facility] [address] [address]  [phone]          30 tablet 0     Sig: TAKE ONE TABLET BY MOUTH ONCE DAILY (NEED TO BE SEEN IN CLINIC FOR FURTHER REFILLS)       Statins Protocol Failed - 8/23/2019 12:58 PM        Failed - LDL on file in past 12 months     Recent Labs   Lab Test 03/10/17  0844   *             Passed - No abnormal creatine kinase in past 12 months     No lab results found.             Passed - Recent (12 mo) or future (30 days) visit within the authorizing provider's specialty     Patient had office visit in the last 12 months or has a visit in the next 30 days with authorizing provider or within the authorizing provider's specialty.  See \"Patient Info\" tab in inbasket, or \"Choose Columns\" in Meds & Orders section of the refill encounter.              Passed - Medication is active on med list        Passed - Patient is age 18 or older        Passed - No active pregnancy on record        Passed - No positive pregnancy test in past 12 months          "

## 2019-08-26 RX ORDER — ATORVASTATIN CALCIUM 40 MG/1
40 TABLET, FILM COATED ORAL DAILY
Qty: 30 TABLET | Refills: 0 | Status: SHIPPED | OUTPATIENT
Start: 2019-08-26 | End: 2019-09-09

## 2019-08-29 DIAGNOSIS — F32.1 MAJOR DEPRESSIVE DISORDER, SINGLE EPISODE, MODERATE (H): ICD-10-CM

## 2019-08-30 RX ORDER — PAROXETINE 40 MG/1
TABLET, FILM COATED ORAL
Qty: 30 TABLET | Refills: 0 | Status: SHIPPED | OUTPATIENT
Start: 2019-08-30 | End: 2019-09-09

## 2019-08-30 NOTE — TELEPHONE ENCOUNTER
"Requested Prescriptions   Pending Prescriptions Disp Refills     PARoxetine (PAXIL) 40 MG tablet [Pharmacy Med Name: PAROXETINE HCL 40MG TABS] 30 tablet 3     Sig: TAKE ONE TABLET BY MOUTH ONCE DAILY  Last Written Prescription Date:  04/01/2019 #30 x 3  Last filled 07/30/2019  Last office visit: 07/22/2019 AISHA Vázquez     Future Office Visit:   Next 5 appointments (look out 90 days)    Sep 09, 2019  3:20 PM CDT  MyChart Physical Adult with NADINE Cooper CNP  [facility] ([facility]) [address] [address]  [phone]                SSRIs Protocol Passed - 8/29/2019  3:38 PM        Passed - PHQ-9 score less than 5 in past 6 months     Please review last PHQ-9 score.   PHQ-9 SCORE 3/10/2017 1/29/2018 4/1/2019   PHQ-9 Total Score - - -   PHQ-9 Total Score Chintan - - 0   PHQ-9 Total Score 1 4 0       JOANA-7 SCORE 11/18/2014 4/1/2019   Total Score 0 -   Total Score - 1 (minimal anxiety)   Total Score - 1                 Passed - Medication is active on med list        Passed - Patient is age 18 or older        Passed - No active pregnancy on record        Passed - No positive pregnancy test in last 12 months        Passed - Recent (6 mo) or future (30 days) visit within the authorizing provider's specialty     Patient had office visit in the last 6 months or has a visit in the next 30 days with authorizing provider or within the authorizing provider's specialty.  See \"Patient Info\" tab in inbasket, or \"Choose Columns\" in Meds & Orders section of the refill encounter.              "

## 2019-08-30 NOTE — TELEPHONE ENCOUNTER
Routing refill request to provider for review/approval because:  Drug interaction warning: Ibuprofen & Paroxetine    Please advise    Nancy Gant RN

## 2019-09-09 ENCOUNTER — OFFICE VISIT (OUTPATIENT)
Dept: FAMILY MEDICINE | Facility: CLINIC | Age: 54
End: 2019-09-09
Payer: COMMERCIAL

## 2019-09-09 VITALS
HEART RATE: 64 BPM | BODY MASS INDEX: 30.79 KG/M2 | WEIGHT: 185 LBS | TEMPERATURE: 97.9 F | RESPIRATION RATE: 14 BRPM | SYSTOLIC BLOOD PRESSURE: 126 MMHG | DIASTOLIC BLOOD PRESSURE: 78 MMHG

## 2019-09-09 DIAGNOSIS — E78.5 HYPERLIPIDEMIA LDL GOAL <130: ICD-10-CM

## 2019-09-09 DIAGNOSIS — Z12.11 SPECIAL SCREENING FOR MALIGNANT NEOPLASMS, COLON: ICD-10-CM

## 2019-09-09 DIAGNOSIS — F41.8 SITUATIONAL ANXIETY: ICD-10-CM

## 2019-09-09 DIAGNOSIS — I10 HYPERTENSION, BENIGN ESSENTIAL, GOAL BELOW 140/90: Primary | ICD-10-CM

## 2019-09-09 DIAGNOSIS — F32.1 MAJOR DEPRESSIVE DISORDER, SINGLE EPISODE, MODERATE (H): ICD-10-CM

## 2019-09-09 DIAGNOSIS — Z12.31 VISIT FOR SCREENING MAMMOGRAM: ICD-10-CM

## 2019-09-09 DIAGNOSIS — R05.9 COUGH: ICD-10-CM

## 2019-09-09 PROCEDURE — 99213 OFFICE O/P EST LOW 20 MIN: CPT | Performed by: NURSE PRACTITIONER

## 2019-09-09 RX ORDER — BENZONATATE 200 MG/1
200 CAPSULE ORAL 3 TIMES DAILY PRN
Qty: 30 CAPSULE | Refills: 2 | Status: SHIPPED | OUTPATIENT
Start: 2019-09-09 | End: 2020-02-26

## 2019-09-09 RX ORDER — PAROXETINE 40 MG/1
40 TABLET, FILM COATED ORAL DAILY
Qty: 90 TABLET | Refills: 1 | Status: SHIPPED | OUTPATIENT
Start: 2019-09-09 | End: 2020-02-26

## 2019-09-09 RX ORDER — ATORVASTATIN CALCIUM 40 MG/1
40 TABLET, FILM COATED ORAL DAILY
Qty: 90 TABLET | Refills: 3 | Status: SHIPPED | OUTPATIENT
Start: 2019-09-09 | End: 2020-06-11

## 2019-09-09 ASSESSMENT — ANXIETY QUESTIONNAIRES
2. NOT BEING ABLE TO STOP OR CONTROL WORRYING: NOT AT ALL
3. WORRYING TOO MUCH ABOUT DIFFERENT THINGS: NOT AT ALL
7. FEELING AFRAID AS IF SOMETHING AWFUL MIGHT HAPPEN: NOT AT ALL
6. BECOMING EASILY ANNOYED OR IRRITABLE: NOT AT ALL
GAD7 TOTAL SCORE: 1
5. BEING SO RESTLESS THAT IT IS HARD TO SIT STILL: NOT AT ALL
1. FEELING NERVOUS, ANXIOUS, OR ON EDGE: SEVERAL DAYS

## 2019-09-09 ASSESSMENT — PATIENT HEALTH QUESTIONNAIRE - PHQ9
SUM OF ALL RESPONSES TO PHQ QUESTIONS 1-9: 0
5. POOR APPETITE OR OVEREATING: NOT AT ALL

## 2019-09-09 ASSESSMENT — PAIN SCALES - GENERAL: PAINLEVEL: NO PAIN (0)

## 2019-09-09 NOTE — NURSING NOTE
"Initial /78 (BP Location: Left arm, Patient Position: Chair, Cuff Size: Adult Large)   Pulse 64   Temp 97.9  F (36.6  C) (Tympanic)   Resp 14   Wt 83.9 kg (185 lb)   BMI 30.79 kg/m   Estimated body mass index is 30.79 kg/m  as calculated from the following:    Height as of 4/5/19: 1.651 m (5' 5\").    Weight as of this encounter: 83.9 kg (185 lb). .    Meghan Britt CMA ([facility])    "

## 2019-09-09 NOTE — PROGRESS NOTES
SUBJECTIVE:   CC: Charlene Morales is an 54 year old woman who presents for preventive health visit.     Healthy Habits:    Getting at least 3 servings of Calcium per day:  Yes    Bi-annual eye exam:  Yes    Dental care twice a year:  NO    Sleep apnea or symptoms of sleep apnea:  Excessive snoring    Diet:  Regular (no restrictions)    Frequency of exercise:  4-5 days/week    Duration of exercise:  30-45 minutes    Taking medications regularly:  No    Barriers to taking medications:  None    Medication side effects:  None    PHQ-2 Total Score:    Additional concerns today:  No    *Had been sick last week with intermittent fevers, will return for flu and shingles vaccines.    *Is not fasting.    Today's PHQ-2 Score:   PHQ-2 ( 1999 Pfizer) 3/10/2017   Q1: Little interest or pleasure in doing things 1   Q2: Feeling down, depressed or hopeless 0   PHQ-2 Score 1       Abuse: Current or Past(Physical, Sexual or Emotional)- No  Do you feel safe in your environment? Yes    Social History     Tobacco Use     Smoking status: Never Smoker     Smokeless tobacco: Never Used   Substance Use Topics     Alcohol use: Yes     Comment: 3-4 drinks a week at most- wine      Alcohol Use 5/9/2016   Prescreen: >3 drinks/day or >7 drinks/week? The patient does not drink >3 drinks per day nor >7 drinks per week.     Reviewed orders with patient.  Reviewed health maintenance and updated orders accordingly - Yes  Labs reviewed in [facility]    Mammogram Screening: Patient over age 50, mutual decision to screen reflected in health maintenance.    Pertinent mammograms are reviewed under the imaging tab.  History of abnormal Pap smear: Status post benign hysterectomy. Health Maintenance and Surgical History updated.  PAP / HPV 8/16/2005   PAP NIL     Reviewed and updated as needed this visit by clinical staff  Tobacco  Allergies  Meds  Med Hx  Surg Hx  Fam Hx  Soc Hx        Reviewed and updated as needed this visit by Provider  Tobacco   Allergies  Meds  Med Hx  Surg Hx  Fam Hx  Soc Hx           Review of Systems  CONSTITUTIONAL: NEGATIVE for fever, chills, change in weight  INTEGUMENTARU/SKIN: NEGATIVE for worrisome rashes, moles or lesions  EYES: NEGATIVE for vision changes or irritation  ENT: POSITIVE  for ear  Pain last week,  That has resolve. , mouth and throat problems - did  Have sore throat but that has resolve   RESP:POSITIVE for cough-productive, dyspnea on exertion and her cough is getting better    CV: NEGATIVE for chest pain, palpitations or peripheral edema  GI: NEGATIVE for nausea, abdominal pain, heartburn, or change in bowel habits  PSY: POSITIVE anxiety that is getting under control.       OBJECTIVE:   /78 (BP Location: Left arm, Patient Position: Chair, Cuff Size: Adult Large)   Pulse 64   Temp 97.9  F (36.6  C) (Tympanic)   Resp 14   Wt 83.9 kg (185 lb)   BMI 30.79 kg/m    Physical Exam  GENERAL: healthy, alert and no distress  HENT: right ear: clear effusion, left ear: clear effusion, nasal mucosa edematous , rhinorrhea clear, oropharynx clear, oral mucous membranes moist and sinuses: not tender  NECK: no adenopathy, no asymmetry, masses, or scars and thyroid normal to palpation  RESP: lungs clear to auscultation - no rales, rhonchi or wheezes  CV: regular rate and rhythm, normal S1 S2, no S3 or S4, no murmur, click or rub, no peripheral edema and peripheral pulses strong          ASSESSMENT/PLAN:     ASSESSMENT/PLAN:      ICD-10-CM    1. Hypertension, benign essential, goal below 140/90 I10 Basic metabolic panel  (Ca, Cl, CO2, Creat, Gluc, K, Na, BUN)   2. Hyperlipidemia LDL goal <130 E78.5 atorvastatin (LIPITOR) 40 MG tablet     Lipid panel reflex to direct LDL Fasting   3. Major depressive disorder, single episode, moderate (H) F32.1 PARoxetine (PAXIL) 40 MG tablet   4. Situational anxiety F41.8    5. Visit for screening mammogram Z12.31 MA Screening Digital Bilateral   6. Special screening for malignant  "neoplasms, colon Z12.11 GASTROENTEROLOGY ADULT REF PROCEDURE ONLY [facility] [phone]   7. Cough R05 benzonatate (TESSALON) 200 MG capsule       Patient Instructions       For the cold.  Get some Airborne,    They have both  Tablets that you put in water or gummies     In place of Metamucil try taking  Benefiber.  This is  Tasteless and and clear and doesn't have the texture    Use the tessalon pearls as needed for cough up to 3 times per day          You will need to make a lab only appointment,  [phone].   You are due for a physical                           COUNSELING:  Reviewed preventive health counseling, as reflected in patient instructions       Regular exercise       Healthy diet/nutrition    Estimated body mass index is 30.79 kg/m  as calculated from the following:    Height as of 4/5/19: 1.651 m (5' 5\").    Weight as of this encounter: 83.9 kg (185 lb).    Weight management plan: Discussed healthy diet and exercise guidelines     reports that she has never smoked. She has never used smokeless tobacco.      Counseling Resources:  ATP IV Guidelines  Pooled Cohorts Equation Calculator  Breast Cancer Risk Calculator  FRAX Risk Assessment  ICSI Preventive Guidelines  Dietary Guidelines for Americans, 2010  USDA's MyPlate  ASA Prophylaxis  Lung CA Screening    YASMANY ROMAN NP, APRN CNP  [facility]  "

## 2019-09-09 NOTE — PATIENT INSTRUCTIONS
For the cold.  Get some Airborne,    They have both  Tablets that you put in water or gummies     In place of Metamucil try taking  Benefiber.  This is  Tasteless and and clear and doesn't have the texture    Use the tessalon pearls as needed for cough up to 3 times per day          You will need to make a lab only appointment,  [phone].   You are due for a physical

## 2019-09-10 ASSESSMENT — ANXIETY QUESTIONNAIRES: GAD7 TOTAL SCORE: 1

## 2019-09-15 ENCOUNTER — NURSE TRIAGE (OUTPATIENT)
Dept: NURSING | Facility: CLINIC | Age: 54
End: 2019-09-15

## 2019-09-15 NOTE — TELEPHONE ENCOUNTER
"Patient reports \"I think I have pink eye, I woke up yesterday morning and my eye was goopy and wet. I woke up this morning and there is clear/yellow drainage.\" Denies fever. Denies pain.  Triage guidelines recommend to be seen within 24 hours. Protocol and care advice reviewed.  Caller states understanding of the recommended disposition.  Advised to call back if further questions or concerns.    Nieves Arredondo RN/[facility]    Reason for Disposition    [1] Eye with yellow/green discharge or eyelashes stick together AND [2] NO PCP standing order to call in antibiotic eye drops    Additional Information    Negative: SEVERE eye pain (e.g., excruciating)    Negative: [1] Eyelids are very swollen (shut or almost) AND [2] fever    Negative: [1] Eyelid (outer) is very red (or tender to touch) AND [2] fever    Negative: Patient sounds very sick or weak to the triager    Negative: MODERATE eye pain (e.g., interferes with normal activities)    Negative: Fever > 104 F (40 C)    Negative: Blurred vision    Negative: Cloudy spot or sore seen on the cornea (clear part of the eye)    Negative: Eye is very swollen (shut or almost)    Negative: [1] MILD eye pain or discomfort AND [2] wears contacts    Negative: Eyelid is red and painful (or tender to touch)    Negative: Discharge from penis    Negative: [1] Lots of yellow or green nasal discharge AND [2] present now AND [3] fever    Negative: Fever present > 3 days (72 hours)    Negative: New or abnormal vaginal discharge    Negative: Weak immune system (e.g., HIV positive, cancer chemo, splenectomy, organ transplant, chronic steroids)    Protocols used: EYE - PUS OR [id number]      "

## 2019-10-02 ENCOUNTER — HEALTH MAINTENANCE LETTER (OUTPATIENT)
Age: 54
End: 2019-10-02

## 2019-10-07 DIAGNOSIS — E78.5 HYPERLIPIDEMIA LDL GOAL <130: ICD-10-CM

## 2019-10-07 DIAGNOSIS — I10 HYPERTENSION, BENIGN ESSENTIAL, GOAL BELOW 140/90: ICD-10-CM

## 2019-10-07 LAB
ALBUMIN SERPL-MCNC: 3.7 G/DL (ref 3.4–5)
ALP SERPL-CCNC: 98 U/L (ref 40–150)
ALT SERPL W P-5'-P-CCNC: 27 U/L (ref 0–50)
ANION GAP SERPL CALCULATED.3IONS-SCNC: 5 MMOL/L (ref 3–14)
AST SERPL W P-5'-P-CCNC: 17 U/L (ref 0–45)
BILIRUB SERPL-MCNC: 0.2 MG/DL (ref 0.2–1.3)
BUN SERPL-MCNC: 35 MG/DL (ref 7–30)
CALCIUM SERPL-MCNC: 9.2 MG/DL (ref 8.5–10.1)
CHLORIDE SERPL-SCNC: 105 MMOL/L (ref 94–109)
CHOLEST SERPL-MCNC: 207 MG/DL
CO2 SERPL-SCNC: 29 MMOL/L (ref 20–32)
CREAT SERPL-MCNC: 0.99 MG/DL (ref 0.52–1.04)
GFR SERPL CREATININE-BSD FRML MDRD: 65 ML/MIN/{1.73_M2}
GLUCOSE SERPL-MCNC: 108 MG/DL (ref 70–99)
HDLC SERPL-MCNC: 59 MG/DL
LDLC SERPL CALC-MCNC: 109 MG/DL
NONHDLC SERPL-MCNC: 148 MG/DL
POTASSIUM SERPL-SCNC: 3.9 MMOL/L (ref 3.4–5.3)
PROT SERPL-MCNC: 7 G/DL (ref 6.8–8.8)
SODIUM SERPL-SCNC: 139 MMOL/L (ref 133–144)
TRIGL SERPL-MCNC: 193 MG/DL

## 2019-10-07 PROCEDURE — 80061 LIPID PANEL: CPT | Performed by: NURSE PRACTITIONER

## 2019-10-07 PROCEDURE — 80053 COMPREHEN METABOLIC PANEL: CPT | Performed by: NURSE PRACTITIONER

## 2019-10-07 PROCEDURE — 36415 COLL VENOUS BLD VENIPUNCTURE: CPT | Performed by: NURSE PRACTITIONER

## 2019-11-06 DIAGNOSIS — K27.9 PEPTIC ULCER: ICD-10-CM

## 2019-11-06 NOTE — TELEPHONE ENCOUNTER
Prescription approved per [facility] Refill Protocol or patient Primary care provider (PCP) Chart and last OV reviewed   LAUREN Holliday RN/[facility]

## 2019-11-18 ENCOUNTER — TELEPHONE (OUTPATIENT)
Dept: FAMILY MEDICINE | Facility: CLINIC | Age: 54
End: 2019-11-18

## 2019-11-18 NOTE — LETTER
November 18, 2019      Charlene Morales  [address]  [place] [address]      Dear Charlene MENDOZA Morales    We care about your health and have reviewed your health plan including your medical conditions, medication list, and lab results.  Based on this review, it is recommended that you follow up regarding the following health topic(s):     -Breast Cancer Screening  -Colon Cancer Screening  -Immunizations    We recommend you take the following action(s):    -- Pneumonia and shingles vaccines. These are recommended for your age group. To receive these vaccines please call to schedule a nurse appointment or, if it is more convenient for you, our pharmacy takes walk ins for these also.     -- Mammogram. The last mammogram that we have on file for you was from 12/28/2010.   Please call one of the following numbers to schedule:  ** [facility] [phone] (at [facility] once a month)  [facility] [phone]  [facility] [phone]  [facility] [phone]  [facility] [phone]  [facility] [phone]    -- Colon screen. Colonoscopy or FIT test (take home test). One of these tests is recommended at age 50 to screen for colon cancer. The last colonoscopy/FIT that we have on file for you was from 05/02/2003.   Please call one of the following numbers to schedule a colonoscopy:  [facility] [phone]  [facility] [phone]  [facility]  [phone]  [facility] [phone] (multiple sites, call for locations)  OR....  If you prefer to do a screening that is LESS INVASIVE AND LESS EXPENSIVE there is an test for you! It is called the FIT test. It is a screening test that is done yearly and can be DONE AT HOME! Do the test at home and mail it in (you don't even have to pay for postage). If you are willing to do this test, we can order the kit for you to  at our clinic. Please call us at [phone] if you need an order for a  colonoscopy or FIT testing.    Please call us at [phone] (or use [employer]) to address the above recommendations.     Thank you for trusting [facility] and we appreciate the opportunity to serve you.  We look forward to supporting your healthcare needs in the future.    Healthy Regards,      Your Health Care Team  [facility]

## 2019-11-19 NOTE — TELEPHONE ENCOUNTER
Panel Management Review      Patient has the following on her problem list:     Depression / Dysthymia review    Patient is due for:  None    Hypertension   Last three blood pressure readings:  BP Readings from Last 3 Encounters:   09/09/19 126/78   04/05/19 (!) 147/107   01/21/19 128/82     Blood pressure: MONITOR    HTN Guidelines:  Less than 140/90      Composite cancer screening  Chart review shows that this patient is due/due soon for the following Mammogram and Colonoscopy  Summary:    Patient is due/failing the following:   Tetanus, Shingrix, Mammogram, Colonoscopy    Action needed:   Patient needs nurse only appointment for updated IMMUNIZATIONS and Patient needs referral/order: MAMMOGRAM and COLONOSCOPY    Type of outreach:    Sent letter.    Questions for provider review:    None                                                                                                                                    Meghan Britt CMA ([facility])       Chart routed to None .

## 2019-12-03 ENCOUNTER — E-VISIT (OUTPATIENT)
Dept: FAMILY MEDICINE | Facility: CLINIC | Age: 54
End: 2019-12-03
Payer: COMMERCIAL

## 2019-12-03 DIAGNOSIS — M25.511 ACUTE PAIN OF RIGHT SHOULDER: Primary | ICD-10-CM

## 2019-12-03 PROCEDURE — 99444 ZZC PHYSICIAN ONLINE EVALUATION & MANAGEMENT SERVICE: CPT | Performed by: NURSE PRACTITIONER

## 2020-01-06 DIAGNOSIS — F41.8 SITUATIONAL ANXIETY: ICD-10-CM

## 2020-01-06 NOTE — TELEPHONE ENCOUNTER
Routing refill request to provider for review/approval because:  Drug not on the [facility] refill protocol or controlled substance  PAlysia Patel  Clinic  RN/[facility]

## 2020-01-08 RX ORDER — LORAZEPAM 0.5 MG/1
0.5 TABLET ORAL PRN
Qty: 30 TABLET | Refills: 0 | Status: SHIPPED | OUTPATIENT
Start: 2020-01-08 | End: 2020-02-26

## 2020-01-13 DIAGNOSIS — F51.01 PRIMARY INSOMNIA: ICD-10-CM

## 2020-01-14 NOTE — TELEPHONE ENCOUNTER
"Requested Prescriptions   Pending Prescriptions Disp Refills     traZODone (DESYREL) 100 MG tablet [Pharmacy Med Name: TRAZODONE HCL 100MG TABS] 60 tablet 8     Sig: TAKE ONE OR TWO TABLETS BY MOUTH EVERY NIGHT AT BEDTIME  Last Written Prescription Date:  04/19/2018 #60 x 8  Last filled 12/05/2019   Last office visit: 9/9/2019 AISHA Vázquez     Future Office Visit:   Next 5 appointments (look out 90 days)    Feb 06, 2020  3:30 PM CST  Screening Mammogram with LLMA1  [facility] ([facility]) [address] [address]  [phone]                Serotonin Modulators Passed - 1/13/2020 10:09 AM        Passed - Recent (12 mo) or future (30 days) visit within the authorizing provider's specialty     Patient has had an office visit with the authorizing provider or a provider within the authorizing providers department within the previous 12 mos or has a future within next 30 days. See \"Patient Info\" tab in inbasket, or \"Choose Columns\" in Meds & Orders section of the refill encounter.              Passed - Medication is active on med list        Passed - Patient is age 18 or older        Passed - No active pregnancy on record        Passed - No positive pregnancy test in past 12 months          "

## 2020-01-17 RX ORDER — TRAZODONE HYDROCHLORIDE 100 MG/1
TABLET ORAL
Qty: 60 TABLET | Refills: 5 | Status: SHIPPED | OUTPATIENT
Start: 2020-01-17 | End: 2020-07-03

## 2020-02-13 DIAGNOSIS — I10 HYPERTENSION, BENIGN ESSENTIAL, GOAL BELOW 140/90: ICD-10-CM

## 2020-02-13 RX ORDER — CHLORTHALIDONE 25 MG/1
TABLET ORAL
Qty: 90 TABLET | Refills: 1 | Status: SHIPPED | OUTPATIENT
Start: 2020-02-13 | End: 2020-06-10

## 2020-02-13 NOTE — TELEPHONE ENCOUNTER
Prescription approved per [facility] Refill Protocol.    Radha MCKEON RN

## 2020-02-13 NOTE — TELEPHONE ENCOUNTER
"Requested Prescriptions   Pending Prescriptions Disp Refills     chlorthalidone (HYGROTON) 25 MG tablet [Pharmacy Med Name: CHLORTHALIDONE 25MG TABS] 90 tablet 1     Sig: TAKE ONE TABLET BY MOUTH ONCE DAILY  Last Written Prescription Date:  05/15/2019 #90 x 1  Last filled 11/18/2019  Last office visit: 9/9/2019 AISHA Vázquez     Future Office Visit:   Next 5 appointments (look out 90 days)    Mar 05, 2020  3:15 PM CST  Screening Mammogram with LLMA1  [facility] ([facility]) [address] [address] [phone]                Diuretics (Including Combos) Protocol Passed - 2/13/2020  5:01 AM        Passed - Blood pressure under 140/90 in past 12 months     BP Readings from Last 3 Encounters:   09/09/19 126/78   04/05/19 (!) 147/107   01/21/19 128/82                 Passed - Recent (12 mo) or future (30 days) visit within the authorizing provider's specialty     Patient has had an office visit with the authorizing provider or a provider within the authorizing providers department within the previous 12 mos or has a future within next 30 days. See \"Patient Info\" tab in inbasket, or \"Choose Columns\" in Meds & Orders section of the refill encounter.              Passed - Medication is active on med list        Passed - Patient is age 18 or older        Passed - No active pregancy on record        Passed - Normal serum creatinine on file in past 12 months     Recent Labs   Lab Test 10/07/19  0751   CR 0.99              Passed - Normal serum potassium on file in past 12 months     Recent Labs   Lab Test 10/07/19  0751   POTASSIUM 3.9                    Passed - Normal serum sodium on file in past 12 months     Recent Labs   Lab Test 10/07/19  0751                 Passed - No positive pregnancy test in past 12 months          "

## 2020-02-26 ENCOUNTER — OFFICE VISIT (OUTPATIENT)
Dept: FAMILY MEDICINE | Facility: CLINIC | Age: 55
End: 2020-02-26
Payer: COMMERCIAL

## 2020-02-26 VITALS
RESPIRATION RATE: 12 BRPM | HEART RATE: 72 BPM | SYSTOLIC BLOOD PRESSURE: 118 MMHG | DIASTOLIC BLOOD PRESSURE: 72 MMHG | OXYGEN SATURATION: 98 % | HEIGHT: 65 IN | TEMPERATURE: 97.6 F | BODY MASS INDEX: 30.56 KG/M2 | WEIGHT: 183.4 LBS

## 2020-02-26 DIAGNOSIS — F41.8 SITUATIONAL ANXIETY: ICD-10-CM

## 2020-02-26 DIAGNOSIS — Z23 NEED FOR PROPHYLACTIC VACCINATION AND INOCULATION AGAINST INFLUENZA: ICD-10-CM

## 2020-02-26 DIAGNOSIS — Z23 NEED FOR VACCINATION: ICD-10-CM

## 2020-02-26 DIAGNOSIS — F32.1 MAJOR DEPRESSIVE DISORDER, SINGLE EPISODE, MODERATE (H): Primary | ICD-10-CM

## 2020-02-26 PROCEDURE — 90750 HZV VACC RECOMBINANT IM: CPT | Performed by: INTERNAL MEDICINE

## 2020-02-26 PROCEDURE — 90714 TD VACC NO PRESV 7 YRS+ IM: CPT | Performed by: INTERNAL MEDICINE

## 2020-02-26 PROCEDURE — 90472 IMMUNIZATION ADMIN EACH ADD: CPT | Performed by: INTERNAL MEDICINE

## 2020-02-26 PROCEDURE — 90471 IMMUNIZATION ADMIN: CPT | Performed by: INTERNAL MEDICINE

## 2020-02-26 PROCEDURE — 99214 OFFICE O/P EST MOD 30 MIN: CPT | Mod: 25 | Performed by: INTERNAL MEDICINE

## 2020-02-26 PROCEDURE — 90682 RIV4 VACC RECOMBINANT DNA IM: CPT | Performed by: INTERNAL MEDICINE

## 2020-02-26 RX ORDER — PAROXETINE 40 MG/1
40 TABLET, FILM COATED ORAL DAILY
Qty: 90 TABLET | Refills: 3 | Status: SHIPPED | OUTPATIENT
Start: 2020-02-26 | End: 2021-03-15

## 2020-02-26 RX ORDER — LORAZEPAM 0.5 MG/1
0.5 TABLET ORAL PRN
Qty: 30 TABLET | Refills: 0 | Status: SHIPPED | OUTPATIENT
Start: 2020-02-26 | End: 2020-04-13

## 2020-02-26 RX ORDER — GABAPENTIN 300 MG/1
300 CAPSULE ORAL DAILY PRN
Qty: 90 CAPSULE | Refills: 3 | Status: SHIPPED | OUTPATIENT
Start: 2020-02-26 | End: 2020-11-02

## 2020-02-26 ASSESSMENT — ANXIETY QUESTIONNAIRES
6. BECOMING EASILY ANNOYED OR IRRITABLE: SEVERAL DAYS
5. BEING SO RESTLESS THAT IT IS HARD TO SIT STILL: NOT AT ALL
IF YOU CHECKED OFF ANY PROBLEMS ON THIS QUESTIONNAIRE, HOW DIFFICULT HAVE THESE PROBLEMS MADE IT FOR YOU TO DO YOUR WORK, TAKE CARE OF THINGS AT HOME, OR GET ALONG WITH OTHER PEOPLE: SOMEWHAT DIFFICULT
7. FEELING AFRAID AS IF SOMETHING AWFUL MIGHT HAPPEN: NOT AT ALL
2. NOT BEING ABLE TO STOP OR CONTROL WORRYING: NOT AT ALL
3. WORRYING TOO MUCH ABOUT DIFFERENT THINGS: NOT AT ALL
1. FEELING NERVOUS, ANXIOUS, OR ON EDGE: MORE THAN HALF THE DAYS
GAD7 TOTAL SCORE: 4

## 2020-02-26 ASSESSMENT — MIFFLIN-ST. JEOR: SCORE: 1433.81

## 2020-02-26 ASSESSMENT — PATIENT HEALTH QUESTIONNAIRE - PHQ9
5. POOR APPETITE OR OVEREATING: SEVERAL DAYS
SUM OF ALL RESPONSES TO PHQ QUESTIONS 1-9: 7

## 2020-02-26 NOTE — PROGRESS NOTES
Subjective     hCarlene Morales is a 55 year old female who presents to clinic today for the following health issues:  Chief Complaint   Patient presents with     Depression     Depression/Anxiety Follow Up , Renew Ativan and Paroxetine     Health Maintenance     Reminded due for Colonoscopy - she has info at home will schedule, has mammogram scheduled .      Imm/Inj     TD and Flu Shot , Shingrix - she will pay out of pocket if not covered.        HPI   Depression and Anxiety Follow-Up    How are you doing with your depression since your last visit? Worsened     How are you doing with your anxiety since your last visit?  Worsened     Are you having other symptoms that might be associated with depression or anxiety? Yes:  short term memory     Have you had a significant life event? Grief or Loss , 3 deaths close to her in the past 3 months . 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 No     She has been on Paxil for many years, started lorazepam last year (has about 15- 20 left of the 30 that she filled in January)- uses this mostly when anxiety interferes with sleep     and daughter use gabapentin for anxiety and this is helpful for them.  She has tried some of their gabapentin, small dose 6-8 times before school and this does give her a sense of calm and ease to get through the day.  She tried 1/2 of a 600mg tablet    She has done therapy in the past, not currently.  Finds this anxiety provoking and draining.       Social History     Tobacco Use     Smoking status: Never Smoker     Smokeless tobacco: Never Used   Substance Use Topics     Alcohol use: Yes     Comment: 3-4 drinks a week at most- wine      Drug use: No     PHQ 4/1/2019 9/9/2019 2/26/2020   PHQ-9 Total Score 0 0 7   Q9: Thoughts of better off dead/self-harm past 2 weeks Not at all Not at all Not at all      JOANA-7 SCORE 4/1/2019 9/9/2019 2/26/2020   Total Score - - -   Total Score 1 (minimal anxiety) - -   Total Score 1 1 4       Suicide Assessment Five-step Evaluation and Treatment (SAFE-T)      How many servings of fruits and vegetables do you eat daily?  2-3    On average, how many sweetened beverages do you drink each day (Examples: soda, juice, sweet tea, etc.  Do NOT count diet or artificially sweetened beverages)?   0    How many days per week do you exercise enough to make your heart beat faster? 5    How many minutes a day do you exercise enough to make your heart beat faster? 30 - 60    How many days per week do you miss taking your medication? 0            Patient Active Problem List   Diagnosis     Hypertension, benign essential, goal below 140/90     Peptic ulcer     Headache     BARBITUATE  DEPENDENCE      IRRITABLE BLADDER     CYSTIC ACNE     Weight gain     Health Care Home     HC     Hyperlipidemia LDL goal <130     History of  addiction  in treatment 7 year ago      Major depressive disorder, recurrent episode, moderate (H)     Primary insomnia     Past Surgical History:   Procedure Laterality Date     HC COLONOSCOPY THRU STOMA, DIAGNOSTIC  5/2/03    Normal. Repeat at age 52     HYSTERECTOMY, PAP NO LONGER INDICATED  2004    Still has ovaries and      SURGICAL HISTORY OF -   8/23/2004    Hemorrhoidectomy & Partial Lateral Sphincterotomy     SURGICAL HISTORY OF -   5/2/2003    Hemorrhoid Banding x2       Social History     Tobacco Use     Smoking status: Never Smoker     Smokeless tobacco: Never Used   Substance Use Topics     Alcohol use: Yes     Comment: 3-4 drinks a week at most- wine      Family History   Problem Relation Age of Onset     Arthritis Mother      Hypertension Mother      Heart Disease Father      Diabetes Father      Lipids Father      Alcohol/Drug Father      C.A.D. Father         open heart surgery at age 50     Cerebrovascular Disease Father      Abdominal Aortic Aneurysm  "Father      Hypertension Maternal Grandfather      Alzheimer Disease Maternal Grandfather      Colon Cancer Maternal Grandfather         age 65     Heart Disease Paternal Grandfather      Thyroid Disease Sister      Hypertension Sister      Hypertension Sister      Breast Cancer No family hx of          Current Outpatient Medications   Medication Sig Dispense Refill     atorvastatin (LIPITOR) 40 MG tablet Take 1 tablet (40 mg) by mouth daily 90 tablet 3     chlorthalidone (HYGROTON) 25 MG tablet TAKE ONE TABLET BY MOUTH ONCE DAILY 90 tablet 1     gabapentin (NEURONTIN) 300 MG capsule Take 1 capsule (300 mg) by mouth daily as needed (anxiety) 90 capsule 3     IBUPROFEN 200 MG OR TABS 5 orally at onset of headache 120 prn     lisinopril (PRINIVIL/ZESTRIL) 40 MG tablet TAKE ONE TABLET BY MOUTH EVERY DAY 90 tablet 3     LORazepam (ATIVAN) 0.5 MG tablet Take 1 tablet (0.5 mg) by mouth as needed for anxiety (use sparingly) 30 tablet 0     omeprazole (PRILOSEC) 20 MG DR capsule TAKE TWO CAPSULES (40MG)  BY MOUTH EVERY  capsule 1     PARoxetine (PAXIL) 40 MG tablet Take 1 tablet (40 mg) by mouth daily 90 tablet 3     sucralfate (CARAFATE) 1 GM tablet TAKE ONE TABLET BY MOUTH FOUR TIMES A DAY (Patient taking differently: Patient takes this as needed) 360 tablet 2     traZODone (DESYREL) 100 MG tablet TAKE ONE OR TWO TABLETS BY MOUTH EVERY NIGHT AT BEDTIME 60 tablet 5     Allergies   Allergen Reactions     Sulfa Drugs        Reviewed and updated as needed this visit by Provider         Review of Systems   ROS COMP: Constitutional, psych systems are negative, except as otherwise noted.      Objective    /72 (BP Location: Right arm, Patient Position: Chair, Cuff Size: Adult Regular)   Pulse 72   Temp 97.6  F (36.4  C) (Tympanic)   Resp 12   Ht 1.645 m (5' 4.75\")   Wt 83.2 kg (183 lb 6.4 oz)   SpO2 98%   Breastfeeding No   BMI 30.76 kg/m    Body mass index is 30.76 kg/m .  Physical Exam   GENERAL: healthy, " alert and no distress  PSYCH: mentation appears normal, affect flat and borderline tearful at times          Assessment & Plan     1. Major depressive disorder, single episode, moderate (H)  and  2. Situational anxiety    Charlene presents with worsening mood symptoms which she feels are situational due to work stress.  She tried some of her 's gabapentin and found that this was very helpful for calming her during her work day.  Her previous provider was not very familiar with use of gabapentin for anxiety, so she presents to ask my opinion.  I do use gabapentin sometimes for anxiety, so I did prescribe this for her- adjust dose as needed/tolerated.  Paxil and lorazepam refills placed on file.      She is on the fence about starting therapy.  Feels like she probably should do this, but it has caused her anxiety and exhaustion in the past.  She will consider- I gave her the business card for our clinic therapist to schedule if desired.     - PARoxetine (PAXIL) 40 MG tablet; Take 1 tablet (40 mg) by mouth daily  Dispense: 90 tablet; Refill: 3  - LORazepam (ATIVAN) 0.5 MG tablet; Take 1 tablet (0.5 mg) by mouth as needed for anxiety (use sparingly)  Dispense: 30 tablet; Refill: 0  - gabapentin (NEURONTIN) 300 MG capsule; Take 1 capsule (300 mg) by mouth daily as needed (anxiety)  Dispense: 90 capsule; Refill: 3    3. Need for prophylactic vaccination and inoculation against influenza    - INFLUENZA QUAD, RECOMBINANT, P-FREE (RIV4) (FLUBLOCK) [81297]  - Vaccine Administration, Initial [53249]    4. Need for vaccination    - ZOSTER VACCINE RECOMBINANT ADJUVANTED IM NJX  - TD PRESERV FREE, IM (7+ YRS)  - EA ADD'L VACCINE       Return in about 1 month (around 3/26/2020) for if not improving.    Kuldip Pickering MD  [facility]

## 2020-02-26 NOTE — PATIENT INSTRUCTIONS
Thank you for choosing [facility].  You may be receiving an email and/or telephone survey request from [facility] Customer Experience regarding your visit today.  Please take a few minutes to respond to the survey to let us know how we are doing.      If you have questions or concerns, please contact us via AGELON ? or you can contact your care team at [phone].    Our Clinic hours are:  Monday 6:40 am  to 7:00 pm  Tuesday -Friday 6:40 am to 5:00 pm    The [place] outpatient lab hours are:  Monday - Friday 6:10 am to 4:45 pm  Saturdays 7:00 am to 11:00 am  Appointments are required, call [phone]    If you have clinical questions after hours or would like to schedule an appointment,  call the clinic at [phone].

## 2020-02-26 NOTE — NURSING NOTE
Prior to immunization administration, verified patients identity using patient s name and date of birth. Please see Immunization Activity for additional information.     TD and Flu Shot , Shingrix - she will pay out of pocket if not covered.     Screening Questionnaire for Adult Immunization    Are you sick today?   No   Do you have allergies to medications, food, a vaccine component or latex?   Yes   Have you ever had a serious reaction after receiving a vaccination?   No   Do you have a long-term health problem with heart, lung, kidney, or metabolic disease (e.g., diabetes), asthma, a blood disorder, no spleen, complement component deficiency, a cochlear implant, or a spinal fluid leak?  Are you on long-term aspirin therapy?   No   Do you have cancer, leukemia, HIV/AIDS, or any other immune system problem?   No   Do you have a parent, brother, or sister with an immune system problem?   No   In the past 3 months, have you taken medications that affect  your immune system, such as prednisone, other steroids, or anticancer drugs; drugs for the treatment of rheumatoid arthritis, Crohn s disease, or psoriasis; or have you had radiation treatments?   No   Have you had a seizure, or a brain or other nervous system problem?   No   During the past year, have you received a transfusion of blood or blood    products, or been given immune (gamma) globulin or antiviral drug?   No   For women: Are you pregnant or is there a chance you could become       pregnant during the next month?   No   Have you received any vaccinations in the past 4 weeks?   No     Immunization questionnaire was positive for at least one answer.  Ok per guidelines for immunizations today        . Patient instructed to remain in clinic for 15 minutes afterwards, and to report any adverse reaction to me immediately.       Screening performed by Alfredo Barajas CMA on 2/26/2020 at 4:08 PM.

## 2020-02-27 ASSESSMENT — ANXIETY QUESTIONNAIRES: GAD7 TOTAL SCORE: 4

## 2020-03-02 PROBLEM — F41.8 SITUATIONAL ANXIETY: Status: ACTIVE | Noted: 2020-03-02

## 2020-04-12 DIAGNOSIS — K27.9 PEPTIC ULCER: ICD-10-CM

## 2020-04-13 DIAGNOSIS — F41.8 SITUATIONAL ANXIETY: ICD-10-CM

## 2020-04-13 RX ORDER — LORAZEPAM 0.5 MG/1
TABLET ORAL
Qty: 30 TABLET | Refills: 0 | Status: SHIPPED | OUTPATIENT
Start: 2020-04-13 | End: 2020-06-15

## 2020-04-13 NOTE — TELEPHONE ENCOUNTER
Routing refill request to provider for review/approval because:  Drug not on the [facility] refill protocol     Nancy Gant RN

## 2020-05-10 DIAGNOSIS — I10 HYPERTENSION, BENIGN ESSENTIAL, GOAL BELOW 140/90: ICD-10-CM

## 2020-05-11 RX ORDER — LISINOPRIL 40 MG/1
TABLET ORAL
Qty: 90 TABLET | Refills: 1 | Status: SHIPPED | OUTPATIENT
Start: 2020-05-11 | End: 2020-10-02

## 2020-06-09 DIAGNOSIS — E78.5 HYPERLIPIDEMIA LDL GOAL <130: ICD-10-CM

## 2020-06-09 DIAGNOSIS — I10 HYPERTENSION, BENIGN ESSENTIAL, GOAL BELOW 140/90: ICD-10-CM

## 2020-06-10 RX ORDER — CHLORTHALIDONE 25 MG/1
TABLET ORAL
Qty: 90 TABLET | Refills: 1 | Status: SHIPPED | OUTPATIENT
Start: 2020-06-10 | End: 2021-02-17

## 2020-06-11 RX ORDER — ATORVASTATIN CALCIUM 40 MG/1
40 TABLET, FILM COATED ORAL DAILY
Qty: 90 TABLET | Refills: 1 | Status: SHIPPED | OUTPATIENT
Start: 2020-06-11 | End: 2021-03-12

## 2020-06-11 NOTE — TELEPHONE ENCOUNTER
LDL Cholesterol Calculated   Date Value Ref Range Status   10/07/2019 109 (H) <100 mg/dL Final     Comment:     Above desirable:  100-129 mg/dl  Borderline High:  130-159 mg/dL  High:             160-189 mg/dL  Very high:       >189 mg/dl         Prescription approved per [facility] Refill Protocol or patient Primary care provider (PCP) Chart and last OV reviewed   LAUREN Holliday RN/Marc Briscoe

## 2020-06-15 DIAGNOSIS — F41.8 SITUATIONAL ANXIETY: ICD-10-CM

## 2020-06-16 RX ORDER — LORAZEPAM 0.5 MG/1
TABLET ORAL
Qty: 30 TABLET | Refills: 0 | Status: SHIPPED | OUTPATIENT
Start: 2020-06-16 | End: 2020-09-16

## 2020-07-03 DIAGNOSIS — F51.01 PRIMARY INSOMNIA: ICD-10-CM

## 2020-07-03 RX ORDER — TRAZODONE HYDROCHLORIDE 100 MG/1
TABLET ORAL
Qty: 60 TABLET | Refills: 4 | Status: SHIPPED | OUTPATIENT
Start: 2020-07-03 | End: 2020-12-03

## 2020-07-03 NOTE — TELEPHONE ENCOUNTER
"Prescription approved per [facility] Refill Protocol.  Emma Hui RN    Last office visit: 9/9/2019 with prescribing provider:  sangeetha   Requested Prescriptions   Pending Prescriptions Disp Refills     traZODone (DESYREL) 100 MG tablet [Pharmacy Med Name: TRAZODONE HCL 100MG TABS] 60 tablet 5     Sig: TAKE ONE OR TWO TABLETS BY MOUTH EVERY NIGHT AT BEDTIME.       Serotonin Modulators Passed - 7/3/2020  5:01 AM        Passed - Recent (12 mo) or future (30 days) visit within the authorizing provider's specialty     Patient has had an office visit with the authorizing provider or a provider within the authorizing providers department within the previous 12 mos or has a future within next 30 days. See \"Patient Info\" tab in inbasket, or \"Choose Columns\" in Meds & Orders section of the refill encounter.              Passed - Medication is active on med list        Passed - Patient is age 18 or older        Passed - No active pregnancy on record        Passed - No positive pregnancy test in past 12 months             "

## 2020-07-04 DIAGNOSIS — F51.01 PRIMARY INSOMNIA: ICD-10-CM

## 2020-07-06 DIAGNOSIS — F51.01 PRIMARY INSOMNIA: ICD-10-CM

## 2020-07-06 RX ORDER — TRAZODONE HYDROCHLORIDE 100 MG/1
TABLET ORAL
Qty: 60 TABLET | Refills: 5 | OUTPATIENT
Start: 2020-07-06

## 2020-07-06 NOTE — TELEPHONE ENCOUNTER
"This refill request was filled on 7.3.2020  Emma Hui RN    Last office visit: 2- with prescribing provider:  Raheel     Requested Prescriptions   Pending Prescriptions Disp Refills     traZODone (DESYREL) 100 MG tablet [Pharmacy Med Name: TRAZODONE HCL 100MG TABS] 60 tablet 5     Sig: TAKE ONE OR TWO TABLETS BY MOUTH EVERY NIGHT AT BEDTIME.       Serotonin Modulators Passed - 7/4/2020  5:01 AM        Passed - Recent (12 mo) or future (30 days) visit within the authorizing provider's specialty     Patient has had an office visit with the authorizing provider or a provider within the authorizing providers department within the previous 12 mos or has a future within next 30 days. See \"Patient Info\" tab in inbasket, or \"Choose Columns\" in Meds & Orders section of the refill encounter.              Passed - Medication is active on med list        Passed - Patient is age 18 or older        Passed - No active pregnancy on record        Passed - No positive pregnancy test in past 12 months             "

## 2020-07-07 RX ORDER — TRAZODONE HYDROCHLORIDE 100 MG/1
TABLET ORAL
Qty: 60 TABLET | Refills: 5 | OUTPATIENT
Start: 2020-07-07

## 2020-09-15 DIAGNOSIS — F41.8 SITUATIONAL ANXIETY: ICD-10-CM

## 2020-09-16 RX ORDER — LORAZEPAM 0.5 MG/1
TABLET ORAL
Qty: 30 TABLET | Refills: 0 | Status: SHIPPED | OUTPATIENT
Start: 2020-09-16 | End: 2021-05-03

## 2020-10-01 DIAGNOSIS — I10 HYPERTENSION, BENIGN ESSENTIAL, GOAL BELOW 140/90: ICD-10-CM

## 2020-10-02 RX ORDER — LISINOPRIL 40 MG/1
TABLET ORAL
Qty: 90 TABLET | Refills: 0 | Status: SHIPPED | OUTPATIENT
Start: 2020-10-02 | End: 2020-12-01

## 2020-10-02 NOTE — TELEPHONE ENCOUNTER
"Prescription approved per [facility] Refill Protocol.  Emma Hui RN    Last office visit: 2.26.20 with prescribing provider:  Raheel       Requested Prescriptions   Pending Prescriptions Disp Refills     lisinopril (ZESTRIL) 40 MG tablet [Pharmacy Med Name: LISINOPRIL 40MG TABS] 90 tablet 1     Sig: TAKE ONE TABLET BY MOUTH ONCE DAILY       ACE Inhibitors (Including Combos) Protocol Passed - 10/1/2020 10:18 AM        Passed - Blood pressure under 140/90 in past 12 months     BP Readings from Last 3 Encounters:   02/26/20 118/72   09/09/19 126/78   04/05/19 (!) 147/107                 Passed - Recent (12 mo) or future (30 days) visit within the authorizing provider's specialty     Patient has had an office visit with the authorizing provider or a provider within the authorizing providers department within the previous 12 mos or has a future within next 30 days. See \"Patient Info\" tab in inbasket, or \"Choose Columns\" in Meds & Orders section of the refill encounter.              Passed - Medication is active on med list        Passed - Patient is age 18 or older        Passed - No active pregnancy on record        Passed - Normal serum creatinine on file in past 12 months     Recent Labs   Lab Test 10/07/19  0751   CR 0.99       Ok to refill medication if creatinine is low          Passed - Normal serum potassium on file in past 12 months     Recent Labs   Lab Test 10/07/19  0751   POTASSIUM 3.9             Passed - No positive pregnancy test within past 12 months             "

## 2020-10-29 ENCOUNTER — MYC REFILL (OUTPATIENT)
Dept: FAMILY MEDICINE | Facility: CLINIC | Age: 55
End: 2020-10-29

## 2020-10-29 DIAGNOSIS — F32.1 MAJOR DEPRESSIVE DISORDER, SINGLE EPISODE, MODERATE (H): ICD-10-CM

## 2020-10-29 DIAGNOSIS — F41.8 SITUATIONAL ANXIETY: ICD-10-CM

## 2020-10-29 RX ORDER — GABAPENTIN 300 MG/1
300 CAPSULE ORAL DAILY PRN
Qty: 90 CAPSULE | Refills: 3 | Status: CANCELLED | OUTPATIENT
Start: 2020-10-29

## 2020-10-30 DIAGNOSIS — F32.1 MAJOR DEPRESSIVE DISORDER, SINGLE EPISODE, MODERATE (H): ICD-10-CM

## 2020-10-30 DIAGNOSIS — F41.8 SITUATIONAL ANXIETY: ICD-10-CM

## 2020-11-02 RX ORDER — GABAPENTIN 300 MG/1
CAPSULE ORAL
Qty: 90 CAPSULE | Refills: 0 | Status: SHIPPED | OUTPATIENT
Start: 2020-11-02 | End: 2020-11-03

## 2020-11-03 RX ORDER — GABAPENTIN 300 MG/1
300 CAPSULE ORAL 2 TIMES DAILY PRN
Qty: 180 CAPSULE | Refills: 0 | Status: SHIPPED | OUTPATIENT
Start: 2020-11-03 | End: 2021-01-28

## 2020-11-03 NOTE — TELEPHONE ENCOUNTER
Charlene reports that she was instructed that she could increase to 2 capsules per day if needed and she has been taking 2/day.  If appropriate can we get a new order?     Thank you,    Kenny Cook, Pharm D  [facility]  [phone]

## 2020-11-05 DIAGNOSIS — K27.9 PEPTIC ULCER: ICD-10-CM

## 2020-11-09 ENCOUNTER — MYC MEDICAL ADVICE (OUTPATIENT)
Dept: FAMILY MEDICINE | Facility: CLINIC | Age: 55
End: 2020-11-09

## 2020-11-09 ENCOUNTER — E-VISIT (OUTPATIENT)
Dept: FAMILY MEDICINE | Facility: CLINIC | Age: 55
End: 2020-11-09
Payer: COMMERCIAL

## 2020-11-09 DIAGNOSIS — M54.41 ACUTE RIGHT-SIDED LOW BACK PAIN WITH RIGHT-SIDED SCIATICA: Primary | ICD-10-CM

## 2020-11-09 PROCEDURE — 99207 PR NON-BILLABLE SERV PER CHARTING: CPT | Performed by: FAMILY MEDICINE

## 2020-11-16 DIAGNOSIS — Z20.822 SUSPECTED 2019 NOVEL CORONAVIRUS INFECTION: Primary | ICD-10-CM

## 2020-11-16 PROCEDURE — U0003 INFECTIOUS AGENT DETECTION BY NUCLEIC ACID (DNA OR RNA); SEVERE ACUTE RESPIRATORY SYNDROME CORONAVIRUS 2 (SARS-COV-2) (CORONAVIRUS DISEASE [COVID-19]), AMPLIFIED PROBE TECHNIQUE, MAKING USE OF HIGH THROUGHPUT TECHNOLOGIES AS DESCRIBED BY CMS-2020-01-R: HCPCS | Performed by: FAMILY MEDICINE

## 2020-11-17 LAB
SARS-COV-2 RNA SPEC QL NAA+PROBE: NOT DETECTED
SPECIMEN SOURCE: NORMAL

## 2020-12-02 DIAGNOSIS — F51.01 PRIMARY INSOMNIA: ICD-10-CM

## 2020-12-03 RX ORDER — TRAZODONE HYDROCHLORIDE 100 MG/1
TABLET ORAL
Qty: 60 TABLET | Refills: 4 | OUTPATIENT
Start: 2020-12-03

## 2020-12-03 RX ORDER — TRAZODONE HYDROCHLORIDE 100 MG/1
TABLET ORAL
Qty: 180 TABLET | Refills: 0 | Status: SHIPPED | OUTPATIENT
Start: 2020-12-03 | End: 2021-03-12

## 2021-01-15 ENCOUNTER — HEALTH MAINTENANCE LETTER (OUTPATIENT)
Age: 56
End: 2021-01-15

## 2021-01-28 ENCOUNTER — TELEPHONE (OUTPATIENT)
Dept: FAMILY MEDICINE | Facility: CLINIC | Age: 56
End: 2021-01-28

## 2021-01-28 DIAGNOSIS — F41.8 SITUATIONAL ANXIETY: ICD-10-CM

## 2021-01-28 DIAGNOSIS — F32.1 MAJOR DEPRESSIVE DISORDER, SINGLE EPISODE, MODERATE (H): ICD-10-CM

## 2021-01-28 RX ORDER — GABAPENTIN 300 MG/1
CAPSULE ORAL
Qty: 180 CAPSULE | Refills: 0 | Status: SHIPPED | OUTPATIENT
Start: 2021-01-28 | End: 2021-03-12

## 2021-01-28 NOTE — TELEPHONE ENCOUNTER
Routing refill request to provider for review/approval because:  Drug not on the [facility] refill protocol     Emma Hui RN

## 2021-01-28 NOTE — TELEPHONE ENCOUNTER
Requested Prescriptions   Pending Prescriptions Disp Refills     gabapentin (NEURONTIN) 300 MG capsule [Pharmacy Med Name: GABAPENTIN 300MG CAPS] 180 capsule 0     Sig: TAKE ONE CAPSULE BY MOUTH TWICE A DAY AS NEEDED FOR ANXIETY       There is no refill protocol information for this order

## 2021-01-29 NOTE — TELEPHONE ENCOUNTER
One refill sent.  Please advise her to schedule virtual visit for follow-up.    Thanks,  Kuldip Pickering MD

## 2021-02-15 DIAGNOSIS — I10 HYPERTENSION, BENIGN ESSENTIAL, GOAL BELOW 140/90: ICD-10-CM

## 2021-02-17 RX ORDER — CHLORTHALIDONE 25 MG/1
25 TABLET ORAL DAILY
Qty: 30 TABLET | Refills: 0 | Status: SHIPPED | OUTPATIENT
Start: 2021-02-17 | End: 2021-03-16

## 2021-02-17 NOTE — TELEPHONE ENCOUNTER
Routing refill request to provider for review/approval because:  Labs not current  Patient needs to be seen because it has been more than 1 year since last office visit.    Pt needs to establish care with new provider    Medication pended for approval, 30 day supply with reminder.

## 2021-03-03 DIAGNOSIS — F51.01 PRIMARY INSOMNIA: ICD-10-CM

## 2021-03-04 RX ORDER — TRAZODONE HYDROCHLORIDE 100 MG/1
TABLET ORAL
Qty: 15 TABLET | Refills: 0 | OUTPATIENT
Start: 2021-03-04

## 2021-03-04 NOTE — TELEPHONE ENCOUNTER
Patient of Tania Vázquez who no longer works for [employer].  Last seen by Tania Vázquez E-visit 12-3-19.  Deny???

## 2021-03-12 ENCOUNTER — MYC MEDICAL ADVICE (OUTPATIENT)
Dept: FAMILY MEDICINE | Facility: CLINIC | Age: 56
End: 2021-03-12

## 2021-03-12 ENCOUNTER — MYC REFILL (OUTPATIENT)
Dept: FAMILY MEDICINE | Facility: CLINIC | Age: 56
End: 2021-03-12

## 2021-03-12 DIAGNOSIS — F41.8 SITUATIONAL ANXIETY: ICD-10-CM

## 2021-03-12 DIAGNOSIS — E78.5 HYPERLIPIDEMIA LDL GOAL <130: ICD-10-CM

## 2021-03-12 DIAGNOSIS — F32.1 MAJOR DEPRESSIVE DISORDER, SINGLE EPISODE, MODERATE (H): ICD-10-CM

## 2021-03-12 DIAGNOSIS — I10 HYPERTENSION, BENIGN ESSENTIAL, GOAL BELOW 140/90: ICD-10-CM

## 2021-03-12 DIAGNOSIS — F51.01 PRIMARY INSOMNIA: ICD-10-CM

## 2021-03-12 RX ORDER — GABAPENTIN 300 MG/1
CAPSULE ORAL
Qty: 180 CAPSULE | Refills: 0 | Status: SHIPPED | OUTPATIENT
Start: 2021-03-12 | End: 2021-04-08

## 2021-03-12 RX ORDER — TRAZODONE HYDROCHLORIDE 100 MG/1
TABLET ORAL
Qty: 180 TABLET | Refills: 0 | Status: SHIPPED | OUTPATIENT
Start: 2021-03-12 | End: 2021-05-03

## 2021-03-12 RX ORDER — CHLORTHALIDONE 25 MG/1
25 TABLET ORAL DAILY
Qty: 30 TABLET | Refills: 0 | Status: CANCELLED | OUTPATIENT
Start: 2021-03-12

## 2021-03-12 RX ORDER — ATORVASTATIN CALCIUM 40 MG/1
40 TABLET, FILM COATED ORAL DAILY
Qty: 30 TABLET | Refills: 0 | Status: SHIPPED | OUTPATIENT
Start: 2021-03-12 | End: 2021-05-03

## 2021-03-12 NOTE — TELEPHONE ENCOUNTER
Patient sent a [employer] message and is out.  Of medication.   Routing refill request to provider for review/approval because:  Alternative provider

## 2021-03-12 NOTE — TELEPHONE ENCOUNTER
Resending to provider as high priority, as patient is requesting this today, needs it for sleep.     Kira Corey RN  [facility]

## 2021-03-12 NOTE — TELEPHONE ENCOUNTER
Lipitor elmer refill approved per [facility] protocol, since patient is schedued to see provider in the next 30 days.  RN resent Trazodone request to provider as high priority.   MyChart sent and closing encounter.     Kira Corey RN  [facility]

## 2021-03-13 DIAGNOSIS — I10 HYPERTENSION, BENIGN ESSENTIAL, GOAL BELOW 140/90: ICD-10-CM

## 2021-03-16 ENCOUNTER — MYC REFILL (OUTPATIENT)
Dept: FAMILY MEDICINE | Facility: CLINIC | Age: 56
End: 2021-03-16

## 2021-03-16 DIAGNOSIS — I10 HYPERTENSION, BENIGN ESSENTIAL, GOAL BELOW 140/90: ICD-10-CM

## 2021-03-16 RX ORDER — CHLORTHALIDONE 25 MG/1
TABLET ORAL
Qty: 30 TABLET | Refills: 0 | Status: SHIPPED | OUTPATIENT
Start: 2021-03-16 | End: 2021-04-19

## 2021-03-16 NOTE — TELEPHONE ENCOUNTER
Routed to provider seeing patient on April 13th to have new PCP attached to refills.    Last refill per provider at our clinic advised visit needed for more refills.    Xenia Frankel RN  [facility]

## 2021-03-16 NOTE — TELEPHONE ENCOUNTER
Duplicate, closing this one and routing request from the pharmacy to new provider to address.    Xenia Frankel RN  [facility]

## 2021-03-16 NOTE — TELEPHONE ENCOUNTER
Routed to provider seeing patient on April 13th to have new PCP attached to refills.    Last refill per provider at our clinic advised visit needed for more refills.     Xenia Frankel RN  [facility]

## 2021-03-19 RX ORDER — CHLORTHALIDONE 25 MG/1
25 TABLET ORAL DAILY
Qty: 30 TABLET | Refills: 0 | OUTPATIENT
Start: 2021-03-19

## 2021-04-07 ENCOUNTER — TELEPHONE (OUTPATIENT)
Dept: FAMILY MEDICINE | Facility: CLINIC | Age: 56
End: 2021-04-07

## 2021-04-07 DIAGNOSIS — E78.5 HYPERLIPIDEMIA LDL GOAL <130: ICD-10-CM

## 2021-04-07 DIAGNOSIS — F32.1 MAJOR DEPRESSIVE DISORDER, SINGLE EPISODE, MODERATE (H): ICD-10-CM

## 2021-04-07 RX ORDER — ATORVASTATIN CALCIUM 40 MG/1
40 TABLET, FILM COATED ORAL DAILY
Qty: 30 TABLET | Refills: 0 | OUTPATIENT
Start: 2021-04-07

## 2021-04-07 NOTE — TELEPHONE ENCOUNTER
Routing refill request to provider for review/approval because:  Patient needs to be seen because:  Patient needs an updated PHQ. I tried to call her but no answer.  Lindsay Mishra RN

## 2021-04-07 NOTE — TELEPHONE ENCOUNTER
"Requested Prescriptions   Pending Prescriptions Disp Refills     atorvastatin (LIPITOR) 40 MG tablet [Pharmacy Med Name: ATORVASTATIN CALCIUM 40MG TABS] 30 tablet 0     Sig: TAKE 1 TABLET (40 MG) BY MOUTH DAILY       Statins Protocol Failed - 4/7/2021  5:00 AM        Failed - LDL on file in past 12 months     Recent Labs   Lab Test 10/07/19  0751   *             Passed - No abnormal creatine kinase in past 12 months     No lab results found.             Passed - Recent (12 mo) or future (30 days) visit within the authorizing provider's specialty     Patient has had an office visit with the authorizing provider or a provider within the authorizing providers department within the previous 12 mos or has a future within next 30 days. See \"Patient Info\" tab in inbasket, or \"Choose Columns\" in Meds & Orders section of the refill encounter.              Passed - Medication is active on med list        Passed - Patient is age 18 or older        Passed - No active pregnancy on record        Passed - No positive pregnancy test in past 12 months             "

## 2021-04-07 NOTE — TELEPHONE ENCOUNTER
"Requested Prescriptions   Pending Prescriptions Disp Refills     PARoxetine (PAXIL) 40 MG tablet [Pharmacy Med Name: PAROXETINE HCL 40MG TABS] 30 tablet 0     Sig: TAKE ONE TABLET BY MOUTH ONCE DAILY       SSRIs Protocol Failed - 4/7/2021  5:00 AM        Failed - PHQ-9 score less than 5 in past 6 months     Please review last PHQ-9 score.           Passed - Medication is active on med list        Passed - Patient is age 18 or older        Passed - No active pregnancy on record        Passed - No positive pregnancy test in last 12 months        Passed - Recent (6 mo) or future (30 days) visit within the authorizing provider's specialty     Patient had office visit in the last 6 months or has a visit in the next 30 days with authorizing provider or within the authorizing provider's specialty.  See \"Patient Info\" tab in inbasket, or \"Choose Columns\" in Meds & Orders section of the refill encounter.                 "

## 2021-04-07 NOTE — LETTER
April 14, 2021      Charlene Barbaws  [address] [place] [address]        Dear Charlene,     We received a refill request for your paroxetine medication.  This medication has been refilled for 30 days as you are due for an office visit or virtual for further refills.  Unfortunately, we were unable to get a hold of you for your virtual visit the other day.  Please call [phone] to schedule an appointment.        Sincerely,        Latrell Lazo MD

## 2021-04-07 NOTE — TELEPHONE ENCOUNTER
Routing refill request to provider for review/approval because:  LDL not current:   Lindsay Mishra RN

## 2021-04-08 ENCOUNTER — MYC REFILL (OUTPATIENT)
Dept: FAMILY MEDICINE | Facility: CLINIC | Age: 56
End: 2021-04-08

## 2021-04-08 DIAGNOSIS — F32.1 MAJOR DEPRESSIVE DISORDER, SINGLE EPISODE, MODERATE (H): ICD-10-CM

## 2021-04-08 DIAGNOSIS — F41.8 SITUATIONAL ANXIETY: ICD-10-CM

## 2021-04-09 RX ORDER — GABAPENTIN 300 MG/1
CAPSULE ORAL
Qty: 180 CAPSULE | Refills: 0 | Status: SHIPPED | OUTPATIENT
Start: 2021-04-09 | End: 2021-04-19

## 2021-04-09 NOTE — TELEPHONE ENCOUNTER
Requested Prescriptions   Pending Prescriptions Disp Refills     gabapentin (NEURONTIN) 300 MG capsule 180 capsule 0     Sig: TAKE ONE CAPSULE BY MOUTH TWICE A DAY AS NEEDED FOR ANXIETY       There is no refill protocol information for this order        Has Virtual Visit scheduled for 4/13/21    Radha MCKEON RN, BSN

## 2021-04-12 ENCOUNTER — E-VISIT (OUTPATIENT)
Dept: URGENT CARE | Facility: URGENT CARE | Age: 56
End: 2021-04-12
Payer: COMMERCIAL

## 2021-04-12 DIAGNOSIS — Z20.822 CLOSE EXPOSURE TO 2019 NOVEL CORONAVIRUS: Primary | ICD-10-CM

## 2021-04-12 PROCEDURE — 99421 OL DIG E/M SVC 5-10 MIN: CPT | Performed by: PHYSICIAN ASSISTANT

## 2021-04-12 NOTE — TELEPHONE ENCOUNTER
"Requested Prescriptions   Pending Prescriptions Disp Refills     PARoxetine (PAXIL) 40 MG tablet [Pharmacy Med Name: PAROXETINE HCL 40MG TABS] 30 tablet 0     Sig: TAKE ONE TABLET BY MOUTH ONCE DAILY       SSRIs Protocol Failed - 4/7/2021  4:02 PM        Failed - PHQ-9 score less than 5 in past 6 months     Please review last PHQ-9 score.           Passed - Medication is active on med list        Passed - Patient is age 18 or older        Passed - No active pregnancy on record        Passed - No positive pregnancy test in last 12 months        Passed - Recent (6 mo) or future (30 days) visit within the authorizing provider's specialty     Patient had office visit in the last 6 months or has a visit in the next 30 days with authorizing provider or within the authorizing provider's specialty.  See \"Patient Info\" tab in inbasket, or \"Choose Columns\" in Meds & Orders section of the refill encounter.                 " normal...

## 2021-04-12 NOTE — PATIENT INSTRUCTIONS
"  Dear Charlene Morales,    Based on your exposure to COVID-19 (coronavirus), we would like to test you for this virus. I have placed an order for this test.The best time for testing is 5-7 days after the exposure.    How to schedule:  Go to your [facility] home page and scroll down to the section that says  You have an appointment that needs to be scheduled  and click the large green button that says  Schedule Now  and follow the steps to find the next available opening.     If you are unable to complete these [facility] scheduling steps, please call [phone] to schedule your testing.     Return to work/school/ guidance:   For people with high risk exposures outside the home    Please let your workplace manager and staffing office know when your quarantine ends.     We can not give you an exact date as it depends on the information below. You can calculate this on your own or work with your manager/staffing office to calculate this. (For example if you were exposed on 10/4, you would have to quarantine for 14 full days. That would be through 10/18. You could return on 10/19.)    Quarantine Guidelines:  Patients (\"contacts\") who have been in close prolonged contact of an infected person(s) (within six feet for at least 15 minutes within a 24 hour period), and remain asymptomatic should enter quarantine based on the following options:    14-day quarantine period (this remains the CDC recommendation for the greatest protection against spread of COVID-19) OR    Minimum 7-day quarantine with negative RT-PCR test collected on day 5 or later OR    10-day quarantine with no test  Quarantine Guideline exceptions are as follows:    People who have been fully vaccinated do not need to quarantine if the exposure was at least 2 weeks after the last vaccination. This includes vaccinated health care workers.    Not fully vaccinated and unvaccinated Individuals who work in health care, congregate care, or congregate living " should be off work for 14 days from their last date of exposure. Community activities for this group can be resumed based on options above. Fully vaccinated individuals in this group do not need to quarantine from work after exposure.    Not fully vaccinated and unvaccinated people whose high-risk exposure was a household member should always quarantine for 14 days from their last date of exposure. Fully vaccinated people in this category do not need to quarantine.    Not fully vaccinated or unvaccinated residents of congregate care and congregate living settings should always quarantine for 14 days from their last date of exposure. Fully vaccinated residents do not need to quarantine.  Note: If you have ongoing exposure to the covid positive person, this quarantine period may be more than 14 days. (For example, if you are continued to be exposed to your child who tested positive and cannot isolate from them, then the quarantine of 7-14 days can't start until your child is no longer contagious. This is typically 10 days from onset of the child's symptoms. So the total duration may be 17-24 days in this case.)    You should continue symptom monitoring until day 14 post-exposure. If you develop signs or symptoms of COVID-19, isolate and get tested (even if you have been tested already).    How to quarantine:   Stay home and away from others. Don't go to school or anywhere else. Generally quarantine means staying home from work but there are some exceptions to this. Please contact your workplace.  No hugging, kissing or shaking hands.  Don't let anyone visit.  Cover your mouth and nose with a mask, tissue or washcloth to avoid spreading germs.  Wash your hands and face often. Use soap and water.    What are the symptoms of COVID-19?  The most common symptoms are cough, fever and trouble breathing. Less common symptoms include headache, body aches, fatigue (feeling very tired), chills, sore throat, stuffy or runny nose,  diarrhea (loose poop), loss of taste or smell, belly pain, and nausea or vomiting (feeling sick to your stomach or throwing up).  After 14 days, if you have still don't have symptoms, you likely don't have this virus.  If you develop symptoms, follow these guidelines.  If you're normally healthy: Please start another eVisit.  If you have a serious health problem (like cancer, heart failure, an organ transplant or kidney disease): Call your specialty clinic. Let them know that you might have COVID-19.    Where can I get more information?  [facility] - About COVID-19: [email or web address]  CDC - What to Do If You're Sick: www.cdc.gov/coronavirus/2019-ncov/about/steps-when-sick.html  CDC - Ending Home Isolation: www.cdc.gov/coronavirus/2019-ncov/hcp/disposition-in-home-patients.html  CDC - Caring for Someone: www.cdc.gov/coronavirus/2019-ncov/if-you-are-sick/care-for-someone.html  [facility] clinical trials (COVID-19 research studies): [email or web address]-clinical-trials  Below are the COVID-19 hotlines at the [facility] ([facility]). Interpreters are available.  For health questions: Call 679-656-6100 or 1-287.542.2739 (7 a.m. to 7 p.m.)  For questions about schools and childcare: Call 585-133-8930 or 1-240.746.8355 (7 a.m. to 7 p.m.)        April 12, 2021  RE:  Charlene Morales                                                                                                                   [address] [facility] [address]      To whom it may concern:    I evaluated Charlene Morales on April 12, 2021. Charlene Morales should be excused from work/school.    They should let their workplace manager and staffing office know when their quarantine ends.    We can not give an exact date as it depends on the information below. They can calculate this on their own or work with their manager/staffing office to calculate this. (For example if they were exposed on 10/04,  "they would have to quarantine for 14 full days. That would be through 10/18. They could return on 10/19.)    Quarantine Guidelines:    Patients (\"contacts\") who have been in close prolonged contact of an infected person(s) (within six feet for at least 15 minutes within a 24 hour period) and remain asymptomatic should enter quarantine based on the following options:      14-day quarantine period (this remains the CDC recommendation for the greatest protection against spread of COVID-19) OR    Minimum 7-day quarantine with negative RT-PCR test collected on day 5 or later OR    10-day quarantine with no test   Quarantine Guideline exceptions are as follows:    People who have been fully vaccinated do not need to quarantine if the exposure was at least 2 weeks after the last vaccination. This includes vaccinated health care workers.    Not fully vaccinated and unvaccinated Individuals who work in health care, congregate care, or congregate living should be off work for 14 days from their last date of exposure. Community activities for this group can be resumed based on options above. Fully vaccinated individuals in this group do not need to quarantine from work after exposure.    Not fully vaccinated and unvaccinated people whose high-risk exposure was a household member should always quarantine for 14 days from their last date of exposure. Fully vaccinated people in this category do not need to quarantine.    Not fully vaccinated or unvaccinated residents of congregate care and congregate living settings should always quarantine for 14 days from their last date of exposure. Fully vaccinated residents do not need to quarantine.    Note: If there is ongoing exposure to the covid positive person, this quarantine period may be longer than 14 days. (For example, if they are continually exposed to their child, who tested positive and cannot isolate from them, then the quarantine of 7-14 days can't start until their child is " no longer contagious. This is typically 10 days from onset to the child's symptoms. So the total duration may be 17-24 days in this case.)    Charlene Morales should continue symptom monitoring until day 14 post-exposure. If they develop signs or symptoms of COVID-19, they should isolate and get tested (even if they have been tested already).    Sincerely,  Pavel Stauffer PA-C

## 2021-04-13 RX ORDER — PAROXETINE 40 MG/1
TABLET, FILM COATED ORAL
Qty: 30 TABLET | Refills: 0 | Status: SHIPPED | OUTPATIENT
Start: 2021-04-13 | End: 2021-05-03

## 2021-04-13 NOTE — TELEPHONE ENCOUNTER
Patient did not answer calls for virtual appointment today.  One refill sent.  Please advise her to reschedule appointment.    Thanks,  Kuldip Pickering MD

## 2021-04-16 ENCOUNTER — TELEPHONE (OUTPATIENT)
Dept: FAMILY MEDICINE | Facility: CLINIC | Age: 56
End: 2021-04-16

## 2021-04-16 DIAGNOSIS — F41.8 SITUATIONAL ANXIETY: ICD-10-CM

## 2021-04-16 DIAGNOSIS — I10 HYPERTENSION, BENIGN ESSENTIAL, GOAL BELOW 140/90: ICD-10-CM

## 2021-04-16 DIAGNOSIS — F32.1 MAJOR DEPRESSIVE DISORDER, SINGLE EPISODE, MODERATE (H): ICD-10-CM

## 2021-04-16 RX ORDER — GABAPENTIN 300 MG/1
CAPSULE ORAL
Qty: 360 CAPSULE | Refills: 0 | Status: CANCELLED | OUTPATIENT
Start: 2021-04-16

## 2021-04-16 NOTE — TELEPHONE ENCOUNTER
Patient states that she is now taking 600 mg (2-300mg capsules) twice a day.  The pharmacy only has prescriptions on file for 300 mg (1-300mg capsule) twice a day.     Can you please review and send a new prescription to the pharmacy for the updated 2 capsules twice a day prescription.    Thank You.

## 2021-04-19 ENCOUNTER — VIRTUAL VISIT (OUTPATIENT)
Dept: FAMILY MEDICINE | Facility: CLINIC | Age: 56
End: 2021-04-19
Payer: COMMERCIAL

## 2021-04-19 DIAGNOSIS — Z53.9 ERRONEOUS ENCOUNTER--DISREGARD: Primary | ICD-10-CM

## 2021-04-19 RX ORDER — CHLORTHALIDONE 25 MG/1
TABLET ORAL
Qty: 30 TABLET | Refills: 0 | Status: SHIPPED | OUTPATIENT
Start: 2021-04-19 | End: 2021-05-03

## 2021-04-19 NOTE — TELEPHONE ENCOUNTER
"Please address at today's visit. Thank you!    Requested Prescriptions   Pending Prescriptions Disp Refills     chlorthalidone (HYGROTON) 25 MG tablet [Pharmacy Med Name: CHLORTHALIDONE 25MG TABS] 30 tablet 0     Sig: TAKE ONE TABLET BY MOUTH ONCE DAILY (NEED TO BE SEEN IN CLINIC FOR FURTHER REFILLS)       Diuretics (Including Combos) Protocol Failed - 4/16/2021  5:00 AM        Failed - Blood pressure under 140/90 in past 12 months     BP Readings from Last 3 Encounters:   02/26/20 118/72   09/09/19 126/78   04/05/19 (!) 147/107                 Failed - Recent (12 mo) or future (30 days) visit within the authorizing provider's specialty     Patient has had an office visit with the authorizing provider or a provider within the authorizing providers department within the previous 12 mos or has a future within next 30 days. See \"Patient Info\" tab in inbasket, or \"Choose Columns\" in Meds & Orders section of the refill encounter.              Failed - Normal serum creatinine on file in past 12 months     Recent Labs   Lab Test 10/07/19  0751   CR 0.99              Failed - Normal serum potassium on file in past 12 months     Recent Labs   Lab Test 10/07/19  0751   POTASSIUM 3.9                    Failed - Normal serum sodium on file in past 12 months     Recent Labs   Lab Test 10/07/19  0751                 Passed - Medication is active on med list        Passed - Patient is age 18 or older        Passed - No active pregancy on record        Passed - No positive pregnancy test in past 12 months             "

## 2021-05-03 ENCOUNTER — VIRTUAL VISIT (OUTPATIENT)
Dept: FAMILY MEDICINE | Facility: CLINIC | Age: 56
End: 2021-05-03
Payer: COMMERCIAL

## 2021-05-03 DIAGNOSIS — I10 HTN, GOAL BELOW 130/80: ICD-10-CM

## 2021-05-03 DIAGNOSIS — K27.9 PEPTIC ULCER: ICD-10-CM

## 2021-05-03 DIAGNOSIS — F51.01 PRIMARY INSOMNIA: ICD-10-CM

## 2021-05-03 DIAGNOSIS — F32.1 MAJOR DEPRESSIVE DISORDER, SINGLE EPISODE, MODERATE (H): Primary | ICD-10-CM

## 2021-05-03 DIAGNOSIS — F41.8 SITUATIONAL ANXIETY: ICD-10-CM

## 2021-05-03 DIAGNOSIS — E78.5 HYPERLIPIDEMIA LDL GOAL <130: ICD-10-CM

## 2021-05-03 PROCEDURE — 99214 OFFICE O/P EST MOD 30 MIN: CPT | Mod: GT | Performed by: INTERNAL MEDICINE

## 2021-05-03 RX ORDER — GABAPENTIN 300 MG/1
600 CAPSULE ORAL 2 TIMES DAILY
Qty: 360 CAPSULE | Refills: 3 | Status: SHIPPED | OUTPATIENT
Start: 2021-05-03 | End: 2022-06-28

## 2021-05-03 RX ORDER — BUPROPION HYDROCHLORIDE 150 MG/1
150 TABLET ORAL EVERY MORNING
Qty: 90 TABLET | Refills: 3 | Status: SHIPPED | OUTPATIENT
Start: 2021-05-03 | End: 2021-12-13

## 2021-05-03 RX ORDER — CHLORTHALIDONE 25 MG/1
TABLET ORAL
Qty: 30 TABLET | Refills: 3 | Status: SHIPPED | OUTPATIENT
Start: 2021-05-03 | End: 2021-09-15

## 2021-05-03 RX ORDER — OMEGA-3 FATTY ACIDS/FISH OIL 300-1000MG
600 CAPSULE ORAL EVERY 4 HOURS PRN
COMMUNITY
End: 2022-05-03

## 2021-05-03 RX ORDER — ATORVASTATIN CALCIUM 40 MG/1
40 TABLET, FILM COATED ORAL DAILY
Qty: 90 TABLET | Refills: 3 | Status: SHIPPED | OUTPATIENT
Start: 2021-05-03 | End: 2022-05-16

## 2021-05-03 RX ORDER — TRAZODONE HYDROCHLORIDE 100 MG/1
TABLET ORAL
Qty: 180 TABLET | Refills: 3 | Status: SHIPPED | OUTPATIENT
Start: 2021-05-03 | End: 2022-06-01

## 2021-05-03 RX ORDER — LISINOPRIL 40 MG/1
40 TABLET ORAL DAILY
Qty: 90 TABLET | Refills: 3 | Status: SHIPPED | OUTPATIENT
Start: 2021-05-03 | End: 2022-05-16

## 2021-05-03 RX ORDER — PAROXETINE 40 MG/1
40 TABLET, FILM COATED ORAL DAILY
Qty: 90 TABLET | Refills: 3 | Status: SHIPPED | OUTPATIENT
Start: 2021-05-03 | End: 2022-05-16

## 2021-05-03 RX ORDER — LORAZEPAM 0.5 MG/1
TABLET ORAL
Qty: 30 TABLET | Refills: 0 | Status: SHIPPED | OUTPATIENT
Start: 2021-05-03 | End: 2021-12-13

## 2021-05-03 ASSESSMENT — PATIENT HEALTH QUESTIONNAIRE - PHQ9
SUM OF ALL RESPONSES TO PHQ QUESTIONS 1-9: 10
5. POOR APPETITE OR OVEREATING: NOT AT ALL

## 2021-05-03 ASSESSMENT — ANXIETY QUESTIONNAIRES
1. FEELING NERVOUS, ANXIOUS, OR ON EDGE: SEVERAL DAYS
GAD7 TOTAL SCORE: 3
6. BECOMING EASILY ANNOYED OR IRRITABLE: SEVERAL DAYS
7. FEELING AFRAID AS IF SOMETHING AWFUL MIGHT HAPPEN: NOT AT ALL
2. NOT BEING ABLE TO STOP OR CONTROL WORRYING: NOT AT ALL
IF YOU CHECKED OFF ANY PROBLEMS ON THIS QUESTIONNAIRE, HOW DIFFICULT HAVE THESE PROBLEMS MADE IT FOR YOU TO DO YOUR WORK, TAKE CARE OF THINGS AT HOME, OR GET ALONG WITH OTHER PEOPLE: VERY DIFFICULT
5. BEING SO RESTLESS THAT IT IS HARD TO SIT STILL: NOT AT ALL
3. WORRYING TOO MUCH ABOUT DIFFERENT THINGS: SEVERAL DAYS

## 2021-05-03 NOTE — PATIENT INSTRUCTIONS
Proton pump inhibitors (PPIs), like the omeprazole that you are taking, can have long term risks if taken for years, such as osteoporosis, kidney problems, and intestinal infections.  Try reducing this to once per day if tolerate.      *  Add in the Wellbutrin to the other meds to see if this helps with depression.  Follow-up if not improving in about 2 months.    *    CALCIUM    Recommendations:  Postmenopausal women not on estrogen: 1500 mg/day    If you are not eating dairy products you also need 400 IU of vitamin D per day which can be obtained in either a multivitamin or in some of the Calcium tablets.    Dietary sources: These also contain vitamin D  Milk                            8 oz            300 mg  Yogurt                          1 cup           400 mg  Hard cheese                     1.5 oz          300 mg  Cottage cheese                  2 cup           300 mg  Orange juice with Calcium       8 oz            300 mg  Low fat dairy sources are recommended    Supplements:  Tums EX                         300 mg  Tums Ultra                      400 mg  Caltrate 600                    600 mg  Oscal                           500 mg  Oscal/D                         500 mg plus vitamin D  Women's Formula Multivitamin    450 mg

## 2021-05-03 NOTE — PROGRESS NOTES
Charlene is a 56 year old who is being evaluated via a billable video visit.      How would you like to obtain your AVS? MyChart  If the video visit is dropped, the invitation should be resent by: Text to cell phone: [phone]  Will anyone else be joining your video visit? No    Video Start Time: 3:21 PM    Assessment & Plan     Major depressive disorder, single episode, moderate (H)    Depression not currently well controlled.  She has been on paroxetine for many years and prefers to continue this.  Would not want to increase dose further since she is also on trazodone.  She has been on Wellbutrin many years ago- we'll try adding this in to see if it helps.  Follow-up in 2 months if not improving or earlier as needed if side effects.     - PARoxetine (PAXIL) 40 MG tablet; Take 1 tablet (40 mg) by mouth daily  - gabapentin (NEURONTIN) 300 MG capsule; Take 2 capsules (600 mg) by mouth 2 times daily  - buPROPion (WELLBUTRIN XL) 150 MG 24 hr tablet; Take 1 tablet (150 mg) by mouth every morning    Situational anxiety    Overall fairly well controlled, continue meds- adding Wellbutrin for depression as noted above.     - gabapentin (NEURONTIN) 300 MG capsule; Take 2 capsules (600 mg) by mouth 2 times daily  - LORazepam (ATIVAN) 0.5 MG tablet; TAKE ONE TABLET BY MOUTH ONCE DAILY AS NEEDED FOR ANXIETY (USE SPARINGLY)    Primary insomnia    Med refilled    - traZODone (DESYREL) 100 MG tablet; TAKE ONE OR TWO TABLETS BY MOUTH EVERY NIGHT AT BEDTIME.    HTN, goal below 130/80    Due for labs, get RN BP check at the same time.  Continue current meds for now    - lisinopril (ZESTRIL) 40 MG tablet; Take 1 tablet (40 mg) by mouth daily  - chlorthalidone (HYGROTON) 25 MG tablet; TAKE ONE TABLET BY MOUTH ONCE DAILY (NEED TO BE SEEN IN CLINIC FOR FURTHER REFILLS)  - **Basic metabolic panel FUTURE anytime; Future    Hyperlipidemia LDL goal <130    Due for lab, continue current med pending results    - atorvastatin (LIPITOR) 40 MG  "tablet; Take 1 tablet (40 mg) by mouth daily  - Lipid panel reflex to direct LDL Fasting; Future    Peptic ulcer    Has been on 40mg omeprazole for many years.  We discussed possible long term complications including decreased bone density- discussed calcium and Vit D recommendation for osteoporosis prevention.  Suggested trying to decrease PPI to 20mg as tolerated.     - omeprazole (PRILOSEC) 20 MG DR capsule; Take 1-2 capsules (20-40 mg) by mouth daily      30 minutes spent on the date of the encounter doing chart review, history and exam, documentation and further activities per the note           Return for Lab appointment, BP check.    Kuldip Pickering MD  [facility]    Christi Chang is a 56 year old who presents for the following health issues     HPI     Hypertension Follow-up      Do you check your blood pressure regularly outside of the clinic? No     Are you following a low salt diet? Yes    Are your blood pressures ever more than 140 on the top number (systolic) OR more   than 90 on the bottom number (diastolic), for example 140/90? No, Fitbit the other day said 100/70.    GI ulcer:  Taking omeprazole 40mg daily but still having some reflux symptoms at night a few times per day.     Depression and Anxiety Follow-Up    How are you doing with your depression since your last visit? Worsened, progressively since last fall    How are you doing with your anxiety since your last visit?  No change    Are you having other symptoms that might be associated with depression or anxiety? Yes:  sleeping okay - tends to want to go to bed and watch t.v. or lay and stare out the window.      Have you had a significant life event? Health Concerns - Mother broke hip last September, November, and patient has been \"in the middle of everything\" to check in with her parents.  Difficult knowing that it needs to be done, however.   also suffers from major anxiety issues, and this, on top of patient's " parents is too much at times for patient to deal with.  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    Do you have any concerns with your use of alcohol or other drugs? No     She is following with a therapist about once per month.      She wonders about hormonal changes contributing- having hot flashes day and night    She has been on paroxetine for decades.  Tried to change to Wellbutrin about 15 years ago but had really bad anxiety        Social History     Tobacco Use     Smoking status: Never Smoker     Smokeless tobacco: Never Used   Substance Use Topics     Alcohol use: Yes     Comment: 3-4 drinks a week at most- wine      Drug use: No     PHQ 9/9/2019 2/26/2020 5/3/2021   PHQ-9 Total Score 0 7 10   Q9: Thoughts of better off dead/self-harm past 2 weeks Not at all Not at all Not at all     JOANA-7 SCORE 9/9/2019 2/26/2020 5/3/2021   Total Score - - -   Total Score - - -   Total Score 1 4 3     Last PHQ-9 5/3/2021   1.  Little interest or pleasure in doing things 2   2.  Feeling down, depressed, or hopeless 2   3.  Trouble falling or staying asleep, or sleeping too much 1   4.  Feeling tired or having little energy 2   5.  Poor appetite or overeating 1   6.  Feeling bad about yourself 1   7.  Trouble concentrating 1   8.  Moving slowly or restless 0   Q9: Thoughts of better off dead/self-harm past 2 weeks 0   PHQ-9 Total Score 10   Difficulty at work, home, or with people Somewhat difficult     JOANA-7  5/3/2021   1. Feeling nervous, anxious, or on edge 1   2. Not being able to stop or control worrying 0   3. Worrying too much about different things 1   4. Trouble relaxing 0   5. Being so restless that it is hard to sit still 0   6. Becoming easily annoyed or irritable 1   7. Feeling afraid, as if something awful might happen 0   JOANA-7 Total Score 3   If you checked any problems, how difficult have they made it for you to do your work, take care of things at home, or get along with other  people? Very difficult       Suicide Assessment Five-step Evaluation and Treatment (SAFE-T)          Review of Systems   Constitutional, psych, CV, GI systems are negative, except as otherwise noted.      Objective           Vitals:  No vitals were obtained today due to virtual visit.    Physical Exam   GENERAL: Healthy, alert and no distress  EYES: Eyes grossly normal to inspection.  No discharge or erythema, or obvious scleral/conjunctival abnormalities.  RESP: No audible wheeze, cough, or visible cyanosis.  No visible retractions or increased work of breathing.    SKIN: Visible skin clear. No significant rash, abnormal pigmentation or lesions.  NEURO: Cranial nerves grossly intact.  Mentation and speech appropriate for age.  PSYCH: Mentation appears normal, affect normal/bright, judgement and insight intact, normal speech and appearance well-groomed.            Video-Visit Details    Type of service:  Video Visit    Video End Time:3:38 PM    Originating Location (pt. Location): Other : work    Distant Location (provider location):  Home    Platform used for Video Visit: [employer]

## 2021-05-04 ASSESSMENT — ANXIETY QUESTIONNAIRES: GAD7 TOTAL SCORE: 3

## 2021-07-02 DIAGNOSIS — F41.8 SITUATIONAL ANXIETY: ICD-10-CM

## 2021-07-05 RX ORDER — LORAZEPAM 0.5 MG/1
TABLET ORAL
Qty: 30 TABLET | Refills: 0 | OUTPATIENT
Start: 2021-07-05

## 2021-07-05 NOTE — TELEPHONE ENCOUNTER
Dr. Pickering,    Routing refill request to provider for review/approval because:  Drug not on the [facility] refill protocol Ines BARRAGAN RN

## 2021-07-05 NOTE — TELEPHONE ENCOUNTER
Left detailed message for the patient with the need to be seen for her recent refill request. Ines BARRAGAN RN

## 2021-07-05 NOTE — TELEPHONE ENCOUNTER
Med last refilled 2 months ago.  Prior to that, her last refill had lasted about 8 months.  If her anxiety has increased that much, she should schedule a follow-up visit (due for one anyway after 5/3 appointment).  Did have appt scheduled for tomorrow but looks like she cancelled it, so please advise her to reschedule.    Thanks,  Kuldip Pickering MD

## 2021-08-02 ENCOUNTER — NURSE TRIAGE (OUTPATIENT)
Dept: NURSING | Facility: CLINIC | Age: 56
End: 2021-08-02

## 2021-08-02 NOTE — TELEPHONE ENCOUNTER
[place] then developed a cough, congestion and sore throat. The sore throat has improved, patient is coughing pretty consistently. There is some phlegm with the cough. She denies any chest pain or difficulty breathing. Patient does have a headache, no neck pain or body aches. Patient does have covid-19 vaccine.    Patient is advised on evaluation and possible covid-19 testing. Virtual visit recommended. Patient is looking for a rapid test because she is going out of town tomorrow to see her mother in [facility]. She reports that she will see if she can get a rapid test elsewhere, otherwise will call back to set up a virtual visit as needed. Patient denies further questions or concerns at this time.    Valerie Mota RN  [facility] Nurse Advisors        Reason for Disposition    [1] COVID-19 infection suspected by caller or triager AND [2] mild symptoms (cough, fever, or others) AND [3] no complications or SOB    Additional Information    Negative: SEVERE difficulty breathing (e.g., struggling for each breath, speaks in single words)    Negative: Difficult to awaken or acting confused (e.g., disoriented, slurred speech)    Negative: Bluish (or gray) lips or face now    Negative: Shock suspected (e.g., cold/pale/clammy skin, too weak to stand, low BP, rapid pulse)    Negative: Sounds like a life-threatening emergency to the triager    Negative: [1] COVID-19 exposure AND [2] has not completed COVID-19 vaccine series AND [3] no symptoms    Negative: [1] COVID-19 exposure AND [2] completed COVID-19 vaccine series (fully vaccinated) AND [3] no symptoms    Negative: COVID-19 vaccine reaction suspected (e.g., fever, headache, muscle aches) occurring during days 1-3 after getting vaccine    Negative: COVID-19 vaccine, questions about    Negative: [1] COVID-19 vaccine series completed (fully vaccinated) in past 3 months AND [2] new-onset of COVID-19 symptoms  BUT [3] no known exposure    Negative: [1] Had lab test confirmed COVID-19 infection within last 3 monthsAND [2] new-onset of COVID-19 symptoms BUT [3] no known exposure    Negative: [1] Lives with someone known to have influenza (flu test positive) AND [2] flu-like symptoms (e.g., cough, runny nose, sore throat, SOB; with or without fever)    Negative: [1] Adult with possible COVID-19 symptoms AND [2] triager concerned about severity of symptoms or other causes    Negative: COVID-19 and breastfeeding, questions about    Negative: SEVERE or constant chest pain or pressure (Exception: mild central chest pain, present only when coughing)    Negative: MODERATE difficulty breathing (e.g., speaks in phrases, SOB even at rest, pulse 100-120)    Negative: [1] Headache AND [2] stiff neck (can't touch chin to chest)    Negative: MILD difficulty breathing (e.g., minimal/no SOB at rest, SOB with walking, pulse <100)    Negative: Chest pain or pressure    Negative: Patient sounds very sick or weak to the triager    Negative: Fever > 103 F (39.4 C)    Negative: [1] Fever > 101 F (38.3 C) AND [2] age > 60 years    Negative: [1] Fever > 100.0 F (37.8 C) AND [2] bedridden (e.g., nursing home patient, CVA, chronic illness, recovering from surgery)    Negative: [1] HIGH RISK patient (e.g., age > 64 years, diabetes, heart or lung disease, weak immune system) AND [2] new or worsening symptoms    Negative: [1] HIGH RISK patient AND [2] influenza is widespread in the community AND [3] ONE OR MORE respiratory symptoms: cough, sore throat, runny or stuffy nose    Negative: [1] HIGH RISK patient AND [2] influenza exposure within the last 7 days AND [3] ONE OR MORE respiratory symptoms: cough, sore throat, runny or stuffy nose    Negative: Fever present > 3 days (72 hours)    Negative: [1] Fever returns after gone for over 24 hours AND [2] symptoms worse or not improved    Negative: [1] Continuous (nonstop) coughing interferes with work or  school AND [2] no improvement using cough treatment per protocol    Protocols used: CORONAVIRUS (COVID-19) DIAGNOSED OR [id number]-[id number]-VG 3.25  COVID 19 Nurse Triage Plan/Patient Instructions    Please be aware that novel coronavirus (COVID-19) may be circulating in the community. If you develop symptoms such as fever, cough, or SOB or if you have concerns about the presence of another infection including coronavirus (COVID-19), please contact your health care provider or visit [email or web address].     Disposition/Instructions    Virtual Visit with provider recommended. Reference Visit Selection Guide.    Thank you for taking steps to prevent the spread of this virus.  o Limit your contact with others.  o Wear a simple mask to cover your cough.  o Wash your hands well and often.    Resources    [facility]: About COVID-19: [email or web address]    CDC: What to Do If You're Sick: www.cdc.gov/coronavirus/2019-ncov/about/steps-when-sick.html    CDC: Ending Home Isolation: www.cdc.gov/coronavirus/2019-ncov/hcp/disposition-in-home-patients.html     CDC: Caring for Someone: www.cdc.gov/coronavirus/2019-ncov/if-you-are-sick/care-for-someone.html     [facility]: Interim Guidance for Hospital Discharge to Home: www.health.[facility].mn.us/diseases/coronavirus/hcp/hospdischarge.pdf    [facility] clinical trials (COVID-19 research studies): clinicalaffairs.[facility]-clinical-trials     Below are the COVID-19 hotlines at the [facility] ([facility]). Interpreters are available.   o For health questions: Call 506-988-0054 or 1-720.463.1642 (7 a.m. to 7 p.m.)  o For questions about schools and childcare: Call 552-438-0298 or 1-212.705.1154 (7 a.m. to 7 p.m.)

## 2021-08-27 ENCOUNTER — OFFICE VISIT (OUTPATIENT)
Dept: FAMILY MEDICINE | Facility: CLINIC | Age: 56
End: 2021-08-27
Payer: COMMERCIAL

## 2021-08-27 VITALS
BODY MASS INDEX: 30.87 KG/M2 | RESPIRATION RATE: 16 BRPM | HEART RATE: 88 BPM | TEMPERATURE: 97.6 F | DIASTOLIC BLOOD PRESSURE: 70 MMHG | WEIGHT: 184.1 LBS | SYSTOLIC BLOOD PRESSURE: 104 MMHG

## 2021-08-27 DIAGNOSIS — S99.922A FOOT INJURY, LEFT, INITIAL ENCOUNTER: Primary | ICD-10-CM

## 2021-08-27 PROCEDURE — 99213 OFFICE O/P EST LOW 20 MIN: CPT | Performed by: INTERNAL MEDICINE

## 2021-08-27 ASSESSMENT — PAIN SCALES - GENERAL: PAINLEVEL: NO PAIN (0)

## 2021-08-27 NOTE — PROGRESS NOTES
Assessment & Plan     1.  Foot injury, left initial encounter.  Soft tissue injury with resultant ecchymosis and swelling.  No tenderness of the foot exam and range of motion normal.  Do not suspect fracture.  Offered to get an x-ray but patient herself did not feel it was fractured and declined.  Reassured that the ecchymosis related to subcutaneous bleeding from injury will self resolve over the next few weeks.  If she notices worsening of symptoms, or redness over the skin suggestive of cellulitis then she should come for reexamination.  Patient agreeable.        No follow-ups on file.    Federico Jennings MD  [facility] TAJ Chang is a 56 year old who presents for the following health issues     Foot Injury    History of Present Illness       She eats 2-3 servings of fruits and vegetables daily.She consumes 0 sweetened beverage(s) daily.She exercises with enough effort to increase her heart rate 9 or less minutes per day.  She exercises with enough effort to increase her heart rate 3 or less days per week.   She is taking medications regularly.       Musculoskeletal problem/pain  Onset/Duration: 3 days  Description  Location: foot - left  Joint Swelling: YES  Redness: no  Pain: no  Warmth: no  Intensity:  severe  Progression of Symptoms:  improving  Accompanying signs and symptoms:  bruising  Fevers: no  Numbness/tingling/weakness: YES-numbness  History  Trauma to the area: YES- fell down wooden stairs, foot hit edge of bottom step  Recent illness:  no  Previous similar problem: no  Previous evaluation:  no  Precipitating or alleviating factors:  Aggravating factors include: none  Therapies tried and outcome: ice    Patient fell while descending on staircase.  Left foot twisted and got caught against the the stairs.  She noticed immediate swelling on the dorsal surface of the foot which she has been icing for the past 2 days.  The swelling has gone down but she decided come in  and have it checked particularly since she feels some numbness there..  She has no pain.  She is able to bear weight on the foot.  She does not feel any tenderness on touch.  She is not on a blood thinner.  She is a teacher by profession.      Review of Systems   Constitutional, HEENT, cardiovascular, pulmonary, GI, , musculoskeletal, neuro, skin, endocrine and psych systems are negative, except as otherwise noted.      Objective    There were no vitals taken for this visit.  There is no height or weight on file to calculate BMI.  Physical Exam   GENERAL: healthy, alert and no distress  MS: Left foot examination reveals mild swelling over the dorsal surface of the foot with ecchymosis particular at the base of the toes.  There is no evidence of erythema to suggest cellulitis.  Pedal pulses including posterior tibial and dorsalis pedis palpable.  The toe examination is normal.  There is no tenderness on palpation of the foot including the toes, metatarsals, tarsals and the ankle joint.  Range of motion of the foot and ankle is normal.

## 2021-09-04 ENCOUNTER — HEALTH MAINTENANCE LETTER (OUTPATIENT)
Age: 56
End: 2021-09-04

## 2021-09-12 ENCOUNTER — TELEPHONE (OUTPATIENT)
Dept: FAMILY MEDICINE | Facility: CLINIC | Age: 56
End: 2021-09-12

## 2021-09-12 DIAGNOSIS — I10 HTN, GOAL BELOW 130/80: ICD-10-CM

## 2021-09-14 ENCOUNTER — TELEPHONE (OUTPATIENT)
Dept: FAMILY MEDICINE | Facility: CLINIC | Age: 56
End: 2021-09-14

## 2021-09-14 NOTE — TELEPHONE ENCOUNTER
Patient Quality Outreach      Summary:    Patient has the following on her problem list/HM: None    Patient is due/failing the following:   Breast Cancer Screening - Mammogram    Type of outreach:    Sent [employer] message.    Questions for provider review:    None                                                                                                                                     NORRIS Mluler.

## 2021-09-15 RX ORDER — CHLORTHALIDONE 25 MG/1
TABLET ORAL
Qty: 30 TABLET | Refills: 0 | Status: SHIPPED | OUTPATIENT
Start: 2021-09-15 | End: 2021-10-15

## 2021-09-24 ENCOUNTER — TELEPHONE (OUTPATIENT)
Dept: FAMILY MEDICINE | Facility: CLINIC | Age: 56
End: 2021-09-24

## 2021-09-24 NOTE — TELEPHONE ENCOUNTER
Patient Quality Outreach Summary      Summary:    Patient is due/failing the following:   Colonoscopy    Type of outreach:    Sent List[employer] message.    Questions for provider review:    None                                                                                                                    NORRIS Muller.

## 2021-10-14 DIAGNOSIS — I10 HTN, GOAL BELOW 130/80: ICD-10-CM

## 2021-10-15 RX ORDER — CHLORTHALIDONE 25 MG/1
TABLET ORAL
Qty: 30 TABLET | Refills: 0 | Status: SHIPPED | OUTPATIENT
Start: 2021-10-15 | End: 2021-11-11

## 2021-10-15 NOTE — TELEPHONE ENCOUNTER
Routing refill request to provider for review/approval because:  Labs not current: Last Creat,K+,Na+ done on 10/7/19.  KPadanelleRN

## 2021-11-11 DIAGNOSIS — I10 HTN, GOAL BELOW 130/80: ICD-10-CM

## 2021-11-11 RX ORDER — CHLORTHALIDONE 25 MG/1
TABLET ORAL
Qty: 14 TABLET | Refills: 0 | Status: SHIPPED | OUTPATIENT
Start: 2021-11-11 | End: 2021-12-08

## 2021-11-11 NOTE — TELEPHONE ENCOUNTER
"Requested Prescriptions   Pending Prescriptions Disp Refills     chlorthalidone (HYGROTON) 25 MG tablet [Pharmacy Med Name: CHLORTHALIDONE 25MG TABS] 30 tablet 0     Sig: TAKE ONE TABLET BY MOUTH ONCE DAILY.  LABS DUE FOR FURTHER REFILLS       Diuretics (Including Combos) Protocol Failed - 11/11/2021  5:01 AM        Failed - Normal serum creatinine on file in past 12 months     Recent Labs   Lab Test 10/07/19  0751   CR 0.99              Failed - Normal serum potassium on file in past 12 months     Recent Labs   Lab Test 10/07/19  0751   POTASSIUM 3.9                    Failed - Normal serum sodium on file in past 12 months     Recent Labs   Lab Test 10/07/19  0751                 Passed - Blood pressure under 140/90 in past 12 months     BP Readings from Last 3 Encounters:   08/27/21 104/70   02/26/20 118/72   09/09/19 126/78                 Passed - Recent (12 mo) or future (30 days) visit within the authorizing provider's specialty     Patient has had an office visit with the authorizing provider or a provider within the authorizing providers department within the previous 12 mos or has a future within next 30 days. See \"Patient Info\" tab in inbasket, or \"Choose Columns\" in Meds & Orders section of the refill encounter.              Passed - Medication is active on med list        Passed - Patient is age 18 or older        Passed - No active pregancy on record        Passed - No positive pregnancy test in past 12 months             "

## 2021-11-12 NOTE — TELEPHONE ENCOUNTER
Charlene Oswald contacted Charlene on 11/12/21 and left a message. If patient calls back please schedule appointment as soon as possible.

## 2021-11-18 ENCOUNTER — LAB (OUTPATIENT)
Dept: LAB | Facility: CLINIC | Age: 56
End: 2021-11-18
Payer: COMMERCIAL

## 2021-11-18 DIAGNOSIS — R79.89 ELEVATED SERUM CREATININE: Primary | ICD-10-CM

## 2021-11-18 DIAGNOSIS — E78.5 HYPERLIPIDEMIA LDL GOAL <130: ICD-10-CM

## 2021-11-18 DIAGNOSIS — I10 HTN, GOAL BELOW 130/80: ICD-10-CM

## 2021-11-18 LAB
ANION GAP SERPL CALCULATED.3IONS-SCNC: 5 MMOL/L (ref 3–14)
BUN SERPL-MCNC: 30 MG/DL (ref 7–30)
CALCIUM SERPL-MCNC: 9.2 MG/DL (ref 8.5–10.1)
CHLORIDE BLD-SCNC: 106 MMOL/L (ref 94–109)
CHOLEST SERPL-MCNC: 186 MG/DL
CO2 SERPL-SCNC: 30 MMOL/L (ref 20–32)
CREAT SERPL-MCNC: 1.14 MG/DL (ref 0.52–1.04)
FASTING STATUS PATIENT QL REPORTED: NO
GFR SERPL CREATININE-BSD FRML MDRD: 55 ML/MIN/1.73M2
GLUCOSE BLD-MCNC: 96 MG/DL (ref 70–99)
HDLC SERPL-MCNC: 67 MG/DL
LDLC SERPL CALC-MCNC: 68 MG/DL
NONHDLC SERPL-MCNC: 119 MG/DL
POTASSIUM BLD-SCNC: 3.7 MMOL/L (ref 3.4–5.3)
SODIUM SERPL-SCNC: 141 MMOL/L (ref 133–144)
TRIGL SERPL-MCNC: 255 MG/DL

## 2021-11-18 PROCEDURE — 36415 COLL VENOUS BLD VENIPUNCTURE: CPT

## 2021-11-18 PROCEDURE — 80048 BASIC METABOLIC PNL TOTAL CA: CPT

## 2021-11-18 PROCEDURE — 80061 LIPID PANEL: CPT

## 2021-12-06 ENCOUNTER — TELEPHONE (OUTPATIENT)
Dept: FAMILY MEDICINE | Facility: CLINIC | Age: 56
End: 2021-12-06
Payer: COMMERCIAL

## 2021-12-06 DIAGNOSIS — F41.8 SITUATIONAL ANXIETY: ICD-10-CM

## 2021-12-06 DIAGNOSIS — I10 HTN, GOAL BELOW 130/80: ICD-10-CM

## 2021-12-08 RX ORDER — LORAZEPAM 0.5 MG/1
TABLET ORAL
Qty: 30 TABLET | Refills: 0 | OUTPATIENT
Start: 2021-12-08

## 2021-12-08 RX ORDER — CHLORTHALIDONE 25 MG/1
TABLET ORAL
Qty: 14 TABLET | Refills: 0 | Status: SHIPPED | OUTPATIENT
Start: 2021-12-08 | End: 2021-12-23

## 2021-12-08 NOTE — TELEPHONE ENCOUNTER
Lorazepam last refilled on 5/3 and filled on 5/4 per [place] .  Has not had a visit for anxiety since then, so recommend scheduling follow-up visit to discuss medication (virtual okay) and also needs lab recheck.  One refill of clorthalidone sent.    Thanks,  Kuldip Pickering MD

## 2021-12-09 ENCOUNTER — TELEPHONE (OUTPATIENT)
Dept: LAB | Facility: CLINIC | Age: 56
End: 2021-12-09
Payer: COMMERCIAL

## 2021-12-09 NOTE — TELEPHONE ENCOUNTER
The pharmacy received a prescription for Charlene for only 14 days of chlorthalidone with a note that she is due for labs.  The patient had labs done on 11/18.    Could you please resend the chlorthalidone prescription for at least a one month supply as patient has had labs recently done?    Thanks.

## 2021-12-09 NOTE — TELEPHONE ENCOUNTER
Pharmacy and patient notified. Patient instructed to schedule a lab-only appt.    Kira Corey RN  [facility]

## 2021-12-09 NOTE — TELEPHONE ENCOUNTER
Patient was informed that labs should be repeated since there was an abnormality on 11/18 labs, but repeat lab has not yet been done, so she does in fact, still need labs.    Thanks,  Kuldip Pickering MD

## 2021-12-13 ENCOUNTER — VIRTUAL VISIT (OUTPATIENT)
Dept: FAMILY MEDICINE | Facility: CLINIC | Age: 56
End: 2021-12-13
Payer: COMMERCIAL

## 2021-12-13 DIAGNOSIS — F41.8 SITUATIONAL ANXIETY: ICD-10-CM

## 2021-12-13 DIAGNOSIS — G89.29 CHRONIC RIGHT SHOULDER PAIN: ICD-10-CM

## 2021-12-13 DIAGNOSIS — M54.2 NECK PAIN: ICD-10-CM

## 2021-12-13 DIAGNOSIS — M25.511 CHRONIC RIGHT SHOULDER PAIN: ICD-10-CM

## 2021-12-13 DIAGNOSIS — F32.1 MAJOR DEPRESSIVE DISORDER, SINGLE EPISODE, MODERATE (H): Primary | ICD-10-CM

## 2021-12-13 DIAGNOSIS — I10 HTN, GOAL BELOW 130/80: ICD-10-CM

## 2021-12-13 PROCEDURE — 99214 OFFICE O/P EST MOD 30 MIN: CPT | Mod: GT | Performed by: INTERNAL MEDICINE

## 2021-12-13 RX ORDER — BUPROPION HYDROCHLORIDE 300 MG/1
300 TABLET ORAL EVERY MORNING
Qty: 90 TABLET | Refills: 3 | Status: SHIPPED | OUTPATIENT
Start: 2021-12-13 | End: 2024-08-13

## 2021-12-13 RX ORDER — LORAZEPAM 0.5 MG/1
TABLET ORAL
Qty: 30 TABLET | Refills: 0 | Status: SHIPPED | OUTPATIENT
Start: 2021-12-13 | End: 2023-03-29

## 2021-12-13 NOTE — PROGRESS NOTES
Charlene is a 56 year old who is being evaluated via a billable video visit.      How would you like to obtain your AVS? MyChart  If the video visit is dropped, the invitation should be resent by: Text to cell phone: [phone]  Will anyone else be joining your video visit? No    Video Start Time: 3:49 PM    Assessment & Plan     Major depressive disorder, single episode, moderate (H)  and  Situational anxiety    Worsened recently, we'll increase Wellbutrin from 150mg to 300mg, continue current Paxil dose.  Lorazepam last refilled in May, refill provided.  Follow-up in 2 months if not improving.     - buPROPion (WELLBUTRIN XL) 300 MG 24 hr tablet; Take 1 tablet (300 mg) by mouth every morning  - Paxil 40mg  - LORazepam (ATIVAN) 0.5 MG tablet; TAKE ONE TABLET BY MOUTH ONCE DAILY AS NEEDED FOR ANXIETY (USE SPARINGLY)    HTN, goal below 130/80    Kidney function was down on last lab check- she notes she was using a lot of NSAIDs around that time and has since decreased usage.  Recheck creatinine.  Continue chlorthalidone and lisinopril for now.     Chronic right shoulder pain  and  Neck pain    Has had 6 months of right neck/shoulder pain.  Had MRI in 2018 with AC arthritis and marrow edema noted when she had prior similar pain.  She'd like to follow-up with sports med for consideration of another shoulder injection since this helped in the past.     - Orthopedic  Referral; Future             Return for Lab appointment.    Kuldip Pickering MD  [facility]    Subjective   Charlene is a 56 year old who presents for the following health issues     History of Present Illness   She consumes 0 sweetened beverage(s) daily.She exercises with enough effort to increase her heart rate 9 or less minutes per day.  She exercises with enough effort to increase her heart rate 3 or less days per week.   She is taking medications regularly.       Hypertension Follow-up    Charlene had labs done on 11/18 and her kidney  function was down.  She reports she was taking Aleve daily or every other day for shoulder/neck pain for a couple of weeks prior to the lab draw; she stopped taking Aleve a few weeks ago.  Has had this pain for 6 months.  She has been drinking a lot of water.       Do you check your blood pressure regularly outside of the clinic? No     Are you following a low salt diet? Yes    Are your blood pressures ever more than 140 on the top number (systolic) OR more   than 90 on the bottom number (diastolic), for example 140/90? unsure      Depression:  She reports feeling like she wants to stay in bed all day, but makes herself get up and do things.  Has stress with her parents' health and her Master's program.  Her  has severe depression/anxiety in winter.  She has been doing therapy about once per month recently.  She last refilled lorazepam in May and is out.      Right shoulder MRI in 2018:  IMPRESSION:   1. Acromioclavicular joint degenerative arthrosis and marked  periarticular marrow edema suggesting recent or repetitive injury  versus ongoing arthrosis.  2. No rotator cuff tendinosis or tear.        Review of Systems   Constitutional, MSK, CV, psych systems are negative, except as otherwise noted.      Objective           Vitals:  No vitals were obtained today due to virtual visit.    Physical Exam   GENERAL: Healthy, alert and no distress  EYES: Eyes grossly normal to inspection.  No discharge or erythema, or obvious scleral/conjunctival abnormalities.  RESP: No audible wheeze, cough, or visible cyanosis.  No visible retractions or increased work of breathing.    SKIN: Visible skin clear. No significant rash, abnormal pigmentation or lesions.  NEURO: Cranial nerves grossly intact.  Mentation and speech appropriate for age.  PSYCH: Mentation appears normal, affect normal/bright, judgement and insight intact, normal speech and appearance well-groomed.            Video-Visit Details    Type of service:  Video  Visit    Video End Time:4:01 PM    Originating Location (pt. Location): Home or work    Distant Location (provider location):  Home     Platform used for Video Visit: Maira    Answers for HPI/ROS submitted by the patient on 12/13/2021  On average, how many sweetened beverages do you drink each day (Examples: soda, juice, sweet tea, etc.  Do NOT count diet or artificially sweetened beverages)?: 0  How many minutes a day do you exercise enough to make your heart beat faster?: 9 or less  How many days a week do you exercise enough to make your heart beat faster?: 3 or less  How many days per week do you miss taking your medication?: 0

## 2021-12-13 NOTE — TELEPHONE ENCOUNTER
LIDA on patient VM to return call to Dr. Pickering's office, one refill sent, needs virtual visit and labs. Nancy Resendiz on 12/13/2021 at 12:15 PM

## 2021-12-15 DIAGNOSIS — M25.511 RIGHT SHOULDER PAIN: Primary | ICD-10-CM

## 2021-12-15 DIAGNOSIS — M54.2 NECK PAIN: ICD-10-CM

## 2021-12-22 ENCOUNTER — LAB (OUTPATIENT)
Dept: LAB | Facility: CLINIC | Age: 56
End: 2021-12-22
Payer: COMMERCIAL

## 2021-12-22 DIAGNOSIS — Z11.4 SCREENING FOR HIV (HUMAN IMMUNODEFICIENCY VIRUS): ICD-10-CM

## 2021-12-22 DIAGNOSIS — R79.89 ELEVATED SERUM CREATININE: ICD-10-CM

## 2021-12-22 DIAGNOSIS — Z11.59 NEED FOR HEPATITIS C SCREENING TEST: ICD-10-CM

## 2021-12-22 DIAGNOSIS — I10 HTN, GOAL BELOW 130/80: ICD-10-CM

## 2021-12-22 DIAGNOSIS — I10 HTN, GOAL BELOW 130/80: Primary | ICD-10-CM

## 2021-12-22 LAB
CREAT SERPL-MCNC: 1.23 MG/DL (ref 0.52–1.04)
CREAT UR-MCNC: 219 MG/DL
GFR SERPL CREATININE-BSD FRML MDRD: 52 ML/MIN/1.73M2
MICROALBUMIN UR-MCNC: 10 MG/L
MICROALBUMIN/CREAT UR: 4.57 MG/G CR (ref 0–25)

## 2021-12-22 PROCEDURE — 82043 UR ALBUMIN QUANTITATIVE: CPT

## 2021-12-22 PROCEDURE — 36415 COLL VENOUS BLD VENIPUNCTURE: CPT

## 2021-12-22 PROCEDURE — 87389 HIV-1 AG W/HIV-1&-2 AB AG IA: CPT

## 2021-12-22 PROCEDURE — 86803 HEPATITIS C AB TEST: CPT

## 2021-12-22 PROCEDURE — 82565 ASSAY OF CREATININE: CPT

## 2021-12-23 LAB
HCV AB SERPL QL IA: NONREACTIVE
HIV 1+2 AB+HIV1 P24 AG SERPL QL IA: NONREACTIVE

## 2021-12-23 RX ORDER — CHLORTHALIDONE 25 MG/1
TABLET ORAL
Qty: 14 TABLET | Refills: 0 | OUTPATIENT
Start: 2021-12-23

## 2021-12-23 NOTE — TELEPHONE ENCOUNTER
Nancy Mancia,     Copied from your note 12/22/21:    Your kidney function is still slightly low.  Let's try stopping the chlorthalidone for now in case this is causing some dehydration.  Please schedule a nurse blood pressure check and another lab appointment in a couple of weeks so we can see how the blood pressure and kidneys are doing off the chlorthalidone.      Do you want to discontinue this for now?    Rosina Graham RN on 12/23/2021 at 9:26 AM

## 2022-02-01 ENCOUNTER — MYC MEDICAL ADVICE (OUTPATIENT)
Dept: FAMILY MEDICINE | Facility: CLINIC | Age: 57
End: 2022-02-01
Payer: COMMERCIAL

## 2022-02-19 ENCOUNTER — HEALTH MAINTENANCE LETTER (OUTPATIENT)
Age: 57
End: 2022-02-19

## 2022-04-05 ENCOUNTER — VIRTUAL VISIT (OUTPATIENT)
Dept: FAMILY MEDICINE | Facility: CLINIC | Age: 57
End: 2022-04-05
Payer: COMMERCIAL

## 2022-04-05 ENCOUNTER — TELEPHONE (OUTPATIENT)
Dept: FAMILY MEDICINE | Facility: CLINIC | Age: 57
End: 2022-04-05

## 2022-04-05 DIAGNOSIS — Z53.9 ERRONEOUS ENCOUNTER--DISREGARD: Primary | ICD-10-CM

## 2022-04-05 NOTE — TELEPHONE ENCOUNTER
Patient calls again to f/u to the status of her request.  See initial message below for details and symptoms. Patient denies concerning symptoms such as breathing problems, severe headache or dehydration.   RN recommended virtual visit for evaluation, orders and work excuse letter.  Patient is agreeable and scheduled for telephone visit this afternoon per request.     Kira Corey RN  [facility]

## 2022-04-05 NOTE — TELEPHONE ENCOUNTER
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    Phone Number Patient can be reached at: Home number on file [phone] (home)    Best Time: any    Can we leave a detailed message on this number? YES    Call taken on 4/5/2022 at 11:30 AM by Funmi Mcfarland

## 2022-05-01 ENCOUNTER — NURSE TRIAGE (OUTPATIENT)
Dept: NURSING | Facility: CLINIC | Age: 57
End: 2022-05-01
Payer: COMMERCIAL

## 2022-05-02 NOTE — TELEPHONE ENCOUNTER
Pt is calling.    3 weeks ago, her blood pressure was 169/113 when she went in to donate plasma.  Just took her BP this AM, and it was 170/103.  170/130 after going for a walk and doing chores at home.   Currently on lisinopril 40 mg for hypertension.   Completely asymptomatic at this time.  Denies HA, visual changes, shortness of breath, wheezing, chest pain, chest tightness or pressure, numbness, tingling, weakness, fever.    Care advice reviewed. Pt has a follow up appointment this Tuesday to discuss her hypertension, but is wondering if she can increase her lisinopril in the mean time.  I advised her that I would page the on call physician, and see what to do from here, and then will give her a call back.    7:24pm-Page to on call physician, Dr Derrick Jolly.  7:25pm-Call back from Dr Jolly.  She is already on the maximum dose of lisinopril. Unable to add any more of that. Advised her to call the office in the AM. See if they can adjust her medication tomorrow morning. Keep follow up appointment with her PCP on Tuesday.  To ED with any symptoms, such as HA, visual changes, swelling, chest pain, shortness of breath, wheezing, weakness, numbness, tingling.  Hold Wellbutrin tomorrow and Tuesday AM until she sees her provider on Tuesday as well, as this can increase her BP.   Decrease salt/sodium intake. Increase water.    7:31pm-Call back to the patient. No answer. Message left on her machine to give us a call back, and ask to speak to a triage nurse. Let them know you are returning a call.    7:51pm-Call back to patient. No answer. Message left for her to call me back on her machine.      Shanice Francis RN  [facility] Nurse Advisor  5/1/2022 at 7:32 PM        COVID 19 Nurse Triage Plan/Patient Instructions    Please be aware that novel coronavirus (COVID-19) may be circulating in the community. If you develop symptoms such as fever, cough, or SOB or if you have concerns about the presence of another  infection including coronavirus (COVID-19), please contact your health care provider or visit [email or web address].     Disposition/Instructions    In-Person Visit with provider recommended. Reference Visit Selection Guide.    Thank you for taking steps to prevent the spread of this virus.  o Limit your contact with others.  o Wear a simple mask to cover your cough.  o Wash your hands well and often.    Resources    [facility]: About COVID-19: [email or web address]    CDC: What to Do If You're Sick: www.cdc.gov/coronavirus/2019-ncov/about/steps-when-sick.html    CDC: Ending Home Isolation: www.cdc.gov/coronavirus/2019-ncov/hcp/disposition-in-home-patients.html     CDC: Caring for Someone: www.cdc.gov/coronavirus/2019-ncov/if-you-are-sick/care-for-someone.html     [facility]: Interim Guidance for Hospital Discharge to Home: www.[facility].Counts include 234 beds at the [facility].mn./diseases/coronavirus/hcp/hospdischarge.pdf    [facility] clinical trials (COVID-19 research studies): [email or web address].[facility]-clinical-trials     Below are the COVID-19 hotlines at the [place] [facility] ([facility]). Interpreters are available.   o For health questions: Call 190-220-5971 or 1-134.343.9469 (7 a.m. to 7 p.m.)  o For questions about schools and childcare: Call 327-989-1271 or 1-876.510.9305 (7 a.m. to 7 p.m.)     Reason for Disposition    [1] Systolic BP  >= 200 OR Diastolic >= 120  AND [2] having NO cardiac or neurologic symptoms    Additional Information    Negative: Difficult to awaken or acting confused (e.g., disoriented, slurred speech)    Negative: Severe difficulty breathing (e.g., struggling for each breath, speaks in single words)    Negative: [1] Weakness of the face, arm or leg on one side of the body AND [2] new onset    Negative: [1] Numbness (i.e., loss of sensation) of the face, arm or leg on one side of the body AND [2] new onset    Negative: [1] Chest pain lasts > 5 minutes AND [2] history of heart  disease  (i.e., heart attack, bypass surgery, angina, angioplasty, CHF)    Negative: [1] Chest pain AND [2] took nitrogylcerin AND [3] pain was not relieved    Negative: Sounds like a life-threatening emergency to the triager    Negative: Symptom is main concern  (e.g., headache, chest pain)    Negative: Low blood pressure is main concern    Negative: [1] Systolic BP  >= 160 OR Diastolic >= 100 AND [2] cardiac or neurologic symptoms (e.g., chest pain, difficulty breathing, unsteady gait, blurred vision)    Negative: [1] Pregnant > 20 weeks (or postpartum < 6 weeks) AND [2] new hand or face swelling    Negative: [1] Pregnant > 20 weeks AND [2] BP Systolic BP  >= 140 OR Diastolic >= 90    Protocols used: HIGH BLOOD PRESSURE-A-AH

## 2022-05-02 NOTE — TELEPHONE ENCOUNTER
Pt returned call. Discussed orders from Dr Jolly as stated in previous entry. Pt voiced understanding, repeated back instructions correctly. Pt agreed w/ plan and that all questions answered.

## 2022-05-03 ENCOUNTER — OFFICE VISIT (OUTPATIENT)
Dept: FAMILY MEDICINE | Facility: CLINIC | Age: 57
End: 2022-05-03
Payer: COMMERCIAL

## 2022-05-03 VITALS
HEIGHT: 65 IN | WEIGHT: 188 LBS | TEMPERATURE: 97.8 F | SYSTOLIC BLOOD PRESSURE: 142 MMHG | HEART RATE: 76 BPM | BODY MASS INDEX: 31.32 KG/M2 | RESPIRATION RATE: 18 BRPM | OXYGEN SATURATION: 96 % | DIASTOLIC BLOOD PRESSURE: 94 MMHG

## 2022-05-03 DIAGNOSIS — Z12.11 SCREEN FOR COLON CANCER: ICD-10-CM

## 2022-05-03 DIAGNOSIS — E66.09 CLASS 1 OBESITY DUE TO EXCESS CALORIES WITH SERIOUS COMORBIDITY AND BODY MASS INDEX (BMI) OF 31.0 TO 31.9 IN ADULT: ICD-10-CM

## 2022-05-03 DIAGNOSIS — I12.9 BENIGN HYPERTENSION WITH CKD (CHRONIC KIDNEY DISEASE) STAGE III (H): ICD-10-CM

## 2022-05-03 DIAGNOSIS — Z23 NEED FOR TDAP VACCINATION: ICD-10-CM

## 2022-05-03 DIAGNOSIS — E66.811 CLASS 1 OBESITY DUE TO EXCESS CALORIES WITH SERIOUS COMORBIDITY AND BODY MASS INDEX (BMI) OF 31.0 TO 31.9 IN ADULT: ICD-10-CM

## 2022-05-03 DIAGNOSIS — Z00.00 ROUTINE GENERAL MEDICAL EXAMINATION AT A HEALTH CARE FACILITY: Primary | ICD-10-CM

## 2022-05-03 DIAGNOSIS — R68.89 EPISODES OF DECREASED ATTENTIVENESS: ICD-10-CM

## 2022-05-03 DIAGNOSIS — N18.31 CHRONIC KIDNEY DISEASE, STAGE 3A (H): ICD-10-CM

## 2022-05-03 DIAGNOSIS — N18.30 BENIGN HYPERTENSION WITH CKD (CHRONIC KIDNEY DISEASE) STAGE III (H): ICD-10-CM

## 2022-05-03 DIAGNOSIS — Z12.31 ENCOUNTER FOR SCREENING MAMMOGRAM FOR BREAST CANCER: ICD-10-CM

## 2022-05-03 DIAGNOSIS — Z78.9 NON-SMOKER: ICD-10-CM

## 2022-05-03 PROCEDURE — 90472 IMMUNIZATION ADMIN EACH ADD: CPT | Performed by: STUDENT IN AN ORGANIZED HEALTH CARE EDUCATION/TRAINING PROGRAM

## 2022-05-03 PROCEDURE — 90471 IMMUNIZATION ADMIN: CPT | Performed by: STUDENT IN AN ORGANIZED HEALTH CARE EDUCATION/TRAINING PROGRAM

## 2022-05-03 PROCEDURE — 99396 PREV VISIT EST AGE 40-64: CPT | Mod: 25 | Performed by: STUDENT IN AN ORGANIZED HEALTH CARE EDUCATION/TRAINING PROGRAM

## 2022-05-03 PROCEDURE — 90715 TDAP VACCINE 7 YRS/> IM: CPT | Performed by: STUDENT IN AN ORGANIZED HEALTH CARE EDUCATION/TRAINING PROGRAM

## 2022-05-03 PROCEDURE — 36415 COLL VENOUS BLD VENIPUNCTURE: CPT | Performed by: STUDENT IN AN ORGANIZED HEALTH CARE EDUCATION/TRAINING PROGRAM

## 2022-05-03 PROCEDURE — 90746 HEPB VACCINE 3 DOSE ADULT IM: CPT | Performed by: STUDENT IN AN ORGANIZED HEALTH CARE EDUCATION/TRAINING PROGRAM

## 2022-05-03 PROCEDURE — 99214 OFFICE O/P EST MOD 30 MIN: CPT | Mod: 25 | Performed by: STUDENT IN AN ORGANIZED HEALTH CARE EDUCATION/TRAINING PROGRAM

## 2022-05-03 PROCEDURE — 80048 BASIC METABOLIC PNL TOTAL CA: CPT | Performed by: STUDENT IN AN ORGANIZED HEALTH CARE EDUCATION/TRAINING PROGRAM

## 2022-05-03 RX ORDER — ADHESIVE BANDAGE 3/4"
BANDAGE TOPICAL
Qty: 1 EACH | Refills: 0 | Status: SHIPPED | OUTPATIENT
Start: 2022-05-03

## 2022-05-03 RX ORDER — AMLODIPINE BESYLATE 5 MG/1
5 TABLET ORAL DAILY
Qty: 30 TABLET | Refills: 0 | Status: SHIPPED | OUTPATIENT
Start: 2022-05-03 | End: 2022-05-23 | Stop reason: ALTCHOICE

## 2022-05-03 ASSESSMENT — ENCOUNTER SYMPTOMS
HEARTBURN: 0
NAUSEA: 0
PARESTHESIAS: 0
JOINT SWELLING: 0
FEVER: 0
HEMATURIA: 0
FREQUENCY: 0
PALPITATIONS: 0
BREAST MASS: 0
CHILLS: 0
WEAKNESS: 0
HEMATOCHEZIA: 0
NERVOUS/ANXIOUS: 0
MYALGIAS: 1
ARTHRALGIAS: 1
SHORTNESS OF BREATH: 0
EYE PAIN: 0
CONSTIPATION: 0
DYSURIA: 0
HEADACHES: 0
COUGH: 0
SORE THROAT: 0
ABDOMINAL PAIN: 0
DIZZINESS: 0
DIARRHEA: 0

## 2022-05-03 ASSESSMENT — PATIENT HEALTH QUESTIONNAIRE - PHQ9: SUM OF ALL RESPONSES TO PHQ QUESTIONS 1-9: 8

## 2022-05-03 ASSESSMENT — PAIN SCALES - GENERAL: PAINLEVEL: MILD PAIN (3)

## 2022-05-03 NOTE — PATIENT INSTRUCTIONS
Aly Chang,    Thank you for allowing [facility] to manage your care.    Stop taking any from of NSAID    Monitor blood pressure daily for now and send me the reading via [email or web address]  I ordered some blood work, please go to the laboratory to get your laboratory studies.      I sent your prescriptions to your pharmacy.    I ordered a mri , please call diagnostic imaging [phone] to schedule your test.    I made a referral, they will be calling in approximately 1 week to set up your appointment.  If you do not hear from them, please call the specialty number on your after visit.     For your convenience, test results are released as soon as they are available  Please allow 1-2 business days for me to send you a comment about your results.  If not done so, I encourage you to login into [employer] ([email or web address] to review your results in real time.     If you have any questions or concerns, please feel free to call us at [phone].    Sincerely,    Dr. Hall    Did you know?      You can schedule a video visit for follow-up appointments as well as future appointments for certain conditions.  Please see the below link.     [email or web address]    If you have not already done so,  I encourage you to sign up for [employer] ([email or web address]).  This will allow you to review your results, securely communicate with a provider, and schedule virtual visits as well.    Preventive Health Recommendations  Female Ages 50 - 64    Yearly exam: See your health care provider every year in order to  Review health changes.   Discuss preventive care.    Review your medicines if your doctor has prescribed any.    Get a Pap test every three years (unless you have an abnormal result and your provider advises testing more often).  If you get Pap tests with HPV test, you only need to test every 5 years, unless you have an abnormal result.   You do  not need a Pap test if your uterus was removed (hysterectomy) and you have not had cancer.  You should be tested each year for STDs (sexually transmitted diseases) if you're at risk.   Have a mammogram every 1 to 2 years.  Have a colonoscopy at age 50, or have a yearly FIT test (stool test). These exams screen for colon cancer.    Have a cholesterol test every 5 years, or more often if advised.  Have a diabetes test (fasting glucose) every three years. If you are at risk for diabetes, you should have this test more often.   If you are at risk for osteoporosis (brittle bone disease), think about having a bone density scan (DEXA).    Shots: Get a flu shot each year. Get a tetanus shot every 10 years.    Nutrition:   Eat at least 5 servings of fruits and vegetables each day.  Eat whole-grain bread, whole-wheat pasta and brown rice instead of white grains and rice.  Get adequate Calcium and Vitamin D.     Lifestyle  Exercise at least 150 minutes a week (30 minutes a day, 5 days a week). This will help you control your weight and prevent disease.  Limit alcohol to one drink per day.  No smoking.   Wear sunscreen to prevent skin cancer.   See your dentist every six months for an exam and cleaning.  See your eye doctor every 1 to 2 years.

## 2022-05-03 NOTE — PROGRESS NOTES
SUBJECTIVE:   CC: Charlene Morales is an 57 year old woman who presents for preventive health visit.       Patient has been advised of split billing requirements and indicates understanding: Yes  Healthy Habits:     Getting at least 3 servings of Calcium per day:  Yes    Bi-annual eye exam:  NO    Dental care twice a year:  NO    Sleep apnea or symptoms of sleep apnea:  Excessive snoring    Diet:  Regular (no restrictions)    Frequency of exercise:  2-3 days/week    Duration of exercise:  15-30 minutes    Taking medications regularly:  Yes    Medication side effects:  None    PHQ-2 Total Score: 2    Additional concerns today:  Yes      ADHD - patient requesting for an evaluation. She has started grad school and she finds it difficult to concentrate.        Hypertension Follow-up      Do you check your blood pressure regularly outside of the clinic? Yes     Are you following a low salt diet? Yes    Are your blood pressures ever more than 140 on the top number (systolic) OR more   than 90 on the bottom number (diastolic), for example 140/90? Yes    Ambulatory bp was 170/100.  Her weight is contributing to hypertension.  She is working on losing weight.  Patient does have kidney disease from previous lab.  Today's PHQ-2 Score:   PHQ-2 ( 1999 Pfizer) 5/3/2022   Q1: Little interest or pleasure in doing things -   Q2: Feeling down, depressed or hopeless -   PHQ-2 Score -   PHQ-2 Total Score (12-17 Years)- Positive if 3 or more points; Administer PHQ-A if positive -   Q1: Little interest or pleasure in doing things -   Q2: Feeling down, depressed or hopeless -   PHQ-2 Score Incomplete       Abuse: Current or Past (Physical, Sexual or Emotional) - No  Do you feel safe in your environment? Yes    Have you ever done Advance Care Planning? (For example, a Health Directive, POLST, or a discussion with a medical provider or your loved ones about your wishes): No, advance care planning information given to patient to review.   Patient declined advance care planning discussion at this time.    Social History     Tobacco Use     Smoking status: Never Smoker     Smokeless tobacco: Never Used   Substance Use Topics     Alcohol use: Yes     Comment: 3-4 drinks a week at most- wine      If you drink alcohol do you typically have >3 drinks per day or >7 drinks per week? No    Alcohol Use 5/9/2016   Prescreen: >3 drinks/day or >7 drinks/week? The patient does not drink >3 drinks per day nor >7 drinks per week.       Reviewed orders with patient.  Reviewed health maintenance and updated orders accordingly - Yes  Current Outpatient Medications   Medication Sig Dispense Refill     amLODIPine (NORVASC) 5 MG tablet Take 1 tablet (5 mg) by mouth daily 30 tablet 0     atorvastatin (LIPITOR) 40 MG tablet Take 1 tablet (40 mg) by mouth daily 90 tablet 3     Blood Pressure Monitoring (BLOOD PRESSURE CUFF) MISC Take measurement 3 times a week 1 each 0     gabapentin (NEURONTIN) 300 MG capsule Take 2 capsules (600 mg) by mouth 2 times daily 360 capsule 3     ibuprofen (ADVIL/MOTRIN) 200 MG capsule Take 600 mg by mouth every 4 hours as needed for fever       lisinopril (ZESTRIL) 40 MG tablet Take 1 tablet (40 mg) by mouth daily 90 tablet 3     LORazepam (ATIVAN) 0.5 MG tablet TAKE ONE TABLET BY MOUTH ONCE DAILY AS NEEDED FOR ANXIETY (USE SPARINGLY) 30 tablet 0     omeprazole (PRILOSEC) 20 MG DR capsule Take 1-2 capsules (20-40 mg) by mouth daily 180 capsule 3     PARoxetine (PAXIL) 40 MG tablet Take 1 tablet (40 mg) by mouth daily 90 tablet 3     traZODone (DESYREL) 100 MG tablet TAKE ONE OR TWO TABLETS BY MOUTH EVERY NIGHT AT BEDTIME. 180 tablet 3     buPROPion (WELLBUTRIN XL) 300 MG 24 hr tablet Take 1 tablet (300 mg) by mouth every morning (Patient not taking: Reported on 5/3/2022) 90 tablet 3     Allergies   Allergen Reactions     Sulfa Drugs        Breast Cancer Screening:  Any new diagnosis of family breast, ovarian, or bowel cancer? No    History of  abnormal Pap smear:  no s/p hysterectomy  PAP / HPV 8/16/2005   PAP (Historical) NIL     Reviewed and updated as needed this visit by clinical staff                    Reviewed and updated as needed this visit by Provider                   Past Medical History:   Diagnosis Date     Anemia, unspecified      CONVULSIONS, OTHER 1/8/2006    History of seizure with withdrawal of Butalbital     Depressive disorder, not elsewhere classified      Headache(784.0)      Other and unspecified alcohol dependence, unspecified drinking behavior      Other convulsions      Peptic ulcer, unspecified site, unspecified as acute or chronic, without mention of hemorrhage, perforation, or obstruction      Premenstrual tension syndromes      RENAL CYST 6/30/2005 June 30, 2005 - 3.7cm simple cyst.  Recheck 6 months. January 10, 2006 - Will recheck. 5/06 - stable.     Unspecified essential hypertension      Unspecified gastritis and gastroduodenitis without mention of hemorrhage      Unspecified hemorrhoids without mention of complication       Past Surgical History:   Procedure Laterality Date     HYSTERECTOMY, PAP NO LONGER INDICATED  2004    Still has ovaries and      SURGICAL HISTORY OF -   8/23/2004    Hemorrhoidectomy & Partial Lateral Sphincterotomy     SURGICAL HISTORY OF -   5/2/2003    Hemorrhoid Banding x2     ZZHC COLONOSCOPY THRU STOMA, DIAGNOSTIC  5/2/03    Normal. Repeat at age 52     OB History   No obstetric history on file.       Review of Systems   Constitutional: Negative for chills and fever.   HENT: Positive for congestion. Negative for ear pain, hearing loss and sore throat.    Eyes: Negative for pain and visual disturbance.   Respiratory: Negative for cough and shortness of breath.    Cardiovascular: Negative for chest pain, palpitations and peripheral edema.   Gastrointestinal: Negative for abdominal pain, constipation, diarrhea, heartburn, hematochezia and nausea.   Breasts:  Negative for tenderness, breast mass  and discharge.   Genitourinary: Negative for dysuria, frequency, genital sores, hematuria, pelvic pain, urgency, vaginal bleeding and vaginal discharge.   Musculoskeletal: Positive for arthralgias and myalgias. Negative for joint swelling.   Skin: Negative for rash.   Neurological: Negative for dizziness, weakness, headaches and paresthesias.   Psychiatric/Behavioral: Positive for mood changes. The patient is not nervous/anxious.        OBJECTIVE:   There were no vitals taken for this visit.  Physical Exam  GENERAL: healthy, alert and no distress  EYES: Eyes grossly normal to inspection, PERRL and conjunctivae and sclerae normal  HENT: ear canals and TM's normal, nose and mouth without ulcers or lesions  NECK: no adenopathy, no asymmetry, masses, or scars and thyroid normal to palpation  RESP: lungs clear to auscultation - no rales, rhonchi or wheezes  CV: regular rate and rhythm, normal S1 S2, no S3 or S4, no murmur, click or rub, no peripheral edema and peripheral pulses strong  ABDOMEN: soft, nontender, no hepatosplenomegaly, no masses and bowel sounds normal  MS: no gross musculoskeletal defects noted, no edema  SKIN: no suspicious lesions or rashes  NEURO: Normal strength and tone, mentation intact and speech normal  PSYCH: mentation appears normal, affect normal/bright    ASSESSMENT/PLAN:   1. Routine general medical examination at a health care facility      2. Benign hypertension with CKD (chronic kidney disease) stage III (H), uncontrolled  Recommend DASH diet  Advised to call or go to the ED immediately if she experiences chest pain ,headache sob, dizziness, visual disturbance, slurred speech,tingling or numbness  Advised her to monitor blood pressure and send readings via [employer].  See patient instruction  - Basic metabolic panel  (Ca, Cl, CO2, Creat, Gluc, K, Na, BUN); Future  - amLODIPine (NORVASC) 5 MG tablet; Take 1 tablet (5 mg) by mouth daily  Dispense: 30 tablet; Refill: 0  - Blood Pressure  "Monitoring (BLOOD PRESSURE CUFF) MISC; Take measurement 3 times a week  Dispense: 1 each; Refill: 0  Creatinine was noted to be elevated at 1.23 4 months ago.  PCP at that time discontinued chlorthalidone.  Patient is currently on lisinopril 40 mg  We will check microalbumin creatinine ratio at next visit.  We will also get an EKG  4. Episodes of decreased attentiveness  PHQ 9 of 8.  - Adult Mental Health  Referral; Future for psychoanalysis    5. Class 1 obesity due to excess calories with serious comorbidity and body mass index (BMI) of 31.0 to 31.9 in adult  Healthy diet and exercise reviewed.  Limit pop and juice intake.  Risks of obesity discussed.  Encourage exercise.  Limit TV/Computer/game time to one hour a day.  Discussed with importance of diet and exercise  and how her HTN is impacted by  her wieght      6. Chronic kidney disease, stage 3a (H)  Patient is advised today to avoid NSAID and nephrotoxin    7. Screen for colon cancer    - Adult Gastro Ref - Procedure Only; Future    8. Encounter for screening mammogram for breast cancer    - MA Screening Digital Bilateral; Future    9. Non-smoker      10. Need for Tdap vaccination    - TDAP VACCINE (Adacel, Boostrix)    Patient has been advised of split billing requirements and indicates understanding: Yes    COUNSELING:  Reviewed preventive health counseling, as reflected in patient instructions       Regular exercise       Healthy diet/nutrition       Vision screening       Osteoporosis prevention/bone health       Colorectal Cancer Screening    Estimated body mass index is 30.87 kg/m  as calculated from the following:    Height as of 2/26/20: 1.645 m (5' 4.75\").    Weight as of 8/27/21: 83.5 kg (184 lb 1.6 oz).    Weight management plan: Discussed healthy diet and exercise guidelines    She reports that she has never smoked. She has never used smokeless tobacco.      Counseling Resources:  ATP IV Guidelines  Pooled Cohorts Equation " Calculator  Breast Cancer Risk Calculator  BRCA-Related Cancer Risk Assessment: FHS-7 Tool  FRAX Risk Assessment  ICSI Preventive Guidelines  Dietary Guidelines for Americans, 2010  USDA's MyPlate  ASA Prophylaxis  Lung CA Screening    Luisa Hall MD  [facility]

## 2022-05-04 LAB
ANION GAP SERPL CALCULATED.3IONS-SCNC: 5 MMOL/L (ref 3–14)
BUN SERPL-MCNC: 24 MG/DL (ref 7–30)
CALCIUM SERPL-MCNC: 9.3 MG/DL (ref 8.5–10.1)
CHLORIDE BLD-SCNC: 108 MMOL/L (ref 94–109)
CO2 SERPL-SCNC: 28 MMOL/L (ref 20–32)
CREAT SERPL-MCNC: 0.93 MG/DL (ref 0.52–1.04)
GFR SERPL CREATININE-BSD FRML MDRD: 72 ML/MIN/1.73M2
GLUCOSE BLD-MCNC: 93 MG/DL (ref 70–99)
POTASSIUM BLD-SCNC: 4 MMOL/L (ref 3.4–5.3)
SODIUM SERPL-SCNC: 141 MMOL/L (ref 133–144)

## 2022-05-15 DIAGNOSIS — K27.9 PEPTIC ULCER: ICD-10-CM

## 2022-05-15 DIAGNOSIS — I10 HTN, GOAL BELOW 130/80: ICD-10-CM

## 2022-05-15 DIAGNOSIS — E78.5 HYPERLIPIDEMIA LDL GOAL <130: ICD-10-CM

## 2022-05-15 DIAGNOSIS — F32.1 MAJOR DEPRESSIVE DISORDER, SINGLE EPISODE, MODERATE (H): ICD-10-CM

## 2022-05-16 RX ORDER — ATORVASTATIN CALCIUM 40 MG/1
TABLET, FILM COATED ORAL
Qty: 90 TABLET | Refills: 3 | Status: SHIPPED | OUTPATIENT
Start: 2022-05-16 | End: 2023-04-25

## 2022-05-16 RX ORDER — LISINOPRIL 40 MG/1
TABLET ORAL
Qty: 90 TABLET | Refills: 0 | Status: SHIPPED | OUTPATIENT
Start: 2022-05-16 | End: 2022-08-11

## 2022-05-16 RX ORDER — PAROXETINE 40 MG/1
TABLET, FILM COATED ORAL
Qty: 90 TABLET | Refills: 0 | Status: SHIPPED | OUTPATIENT
Start: 2022-05-16 | End: 2022-08-11

## 2022-05-16 NOTE — TELEPHONE ENCOUNTER
"Requested Prescriptions   Pending Prescriptions Disp Refills    lisinopril (ZESTRIL) 40 MG tablet [Pharmacy Med Name: LISINOPRIL 40MG TABS] 90 tablet 3     Sig: TAKE ONE TABLET BY MOUTH ONCE DAILY        ACE Inhibitors (Including Combos) Protocol Failed - 5/16/2022  2:29 PM        Failed - Blood pressure under 140/90 in past 12 months       BP Readings from Last 3 Encounters:   05/03/22 (!) 142/94   08/27/21 104/70   02/26/20 118/72                 Passed - Recent (12 mo) or future (30 days) visit within the authorizing provider's specialty     Patient has had an office visit with the authorizing provider or a provider within the authorizing providers department within the previous 12 mos or has a future within next 30 days. See \"Patient Info\" tab in inbasket, or \"Choose Columns\" in Meds & Orders section of the refill encounter.              Passed - Medication is active on med list        Passed - Patient is age 18 or older        Passed - No active pregnancy on record        Passed - Normal serum creatinine on file in past 12 months     Recent Labs   Lab Test 05/03/22  1702   CR 0.93       Ok to refill medication if creatinine is low          Passed - Normal serum potassium on file in past 12 months     Recent Labs   Lab Test 05/03/22  1702   POTASSIUM 4.0               Passed - No positive pregnancy test within past 12 months           PARoxetine (PAXIL) 40 MG tablet [Pharmacy Med Name: PAROXETINE HCL 40MG TABS] 90 tablet 3     Sig: TAKE ONE TABLET BY MOUTH ONCE DAILY        SSRIs Protocol Failed - 5/16/2022  2:29 PM        Failed - PHQ-9 score less than 5 in past 6 months     Please review last PHQ-9 score.           Passed - Medication is active on med list        Passed - Patient is age 18 or older        Passed - No active pregnancy on record        Passed - No positive pregnancy test in last 12 months        Passed - Recent (6 mo) or future (30 days) visit within the authorizing provider's specialty     " "Patient had office visit in the last 6 months or has a visit in the next 30 days with authorizing provider or within the authorizing provider's specialty.  See \"Patient Info\" tab in inbasket, or \"Choose Columns\" in Meds & Orders section of the refill encounter.              Signed Prescriptions Disp Refills    atorvastatin (LIPITOR) 40 MG tablet 90 tablet 3     Sig: TAKE ONE TABLET BY MOUTH ONCE DAILY        Statins Protocol Passed - 5/16/2022  2:29 PM        Passed - LDL on file in past 12 months     Recent Labs   Lab Test 11/18/21  1539   LDL 68               Passed - No abnormal creatine kinase in past 12 months     No lab results found.             Passed - Recent (12 mo) or future (30 days) visit within the authorizing provider's specialty     Patient has had an office visit with the authorizing provider or a provider within the authorizing providers department within the previous 12 mos or has a future within next 30 days. See \"Patient Info\" tab in inbasket, or \"Choose Columns\" in Meds & Orders section of the refill encounter.              Passed - Medication is active on med list        Passed - Patient is age 18 or older        Passed - No active pregnancy on record        Passed - No positive pregnancy test in past 12 months           omeprazole (PRILOSEC) 20 MG DR capsule 180 capsule 3     Sig: TAKE ONE TO TWO CAPSULES BY MOUTH EVERY DAY        PPI Protocol Passed - 5/16/2022  2:29 PM        Passed - Not on Clopidogrel (unless Pantoprazole ordered)        Passed - No diagnosis of osteoporosis on record        Passed - Recent (12 mo) or future (30 days) visit within the authorizing provider's specialty     Patient has had an office visit with the authorizing provider or a provider within the authorizing providers department within the previous 12 mos or has a future within next 30 days. See \"Patient Info\" tab in inbasket, or \"Choose Columns\" in Meds & Orders section of the refill encounter.              " Passed - Medication is active on med list        Passed - Patient is age 18 or older        Passed - No active pregnacy on record        Passed - No positive pregnancy test in past 12 months

## 2022-05-16 NOTE — TELEPHONE ENCOUNTER
One refill of med sent.  Patient saw a different provider on 5/3 and is working on improving blood pressure control per their visit.  Patient should request further med refills from new provider if she continue to plan to follow with them.    Thanks,  Kuldip Pickering MD

## 2022-05-20 DIAGNOSIS — I12.9 BENIGN HYPERTENSION WITH CKD (CHRONIC KIDNEY DISEASE) STAGE III (H): ICD-10-CM

## 2022-05-20 DIAGNOSIS — N18.30 BENIGN HYPERTENSION WITH CKD (CHRONIC KIDNEY DISEASE) STAGE III (H): ICD-10-CM

## 2022-05-20 DIAGNOSIS — I10 HTN, GOAL BELOW 130/80: ICD-10-CM

## 2022-05-20 DIAGNOSIS — R68.89 EPISODES OF DECREASED ATTENTIVENESS: Primary | ICD-10-CM

## 2022-05-23 DIAGNOSIS — R68.89 EPISODES OF DECREASED ATTENTIVENESS: Primary | ICD-10-CM

## 2022-05-23 RX ORDER — AMLODIPINE BESYLATE 10 MG/1
10 TABLET ORAL DAILY
Qty: 30 TABLET | Refills: 4 | Status: SHIPPED | OUTPATIENT
Start: 2022-05-23 | End: 2022-10-17

## 2022-05-24 RX ORDER — AMLODIPINE BESYLATE 5 MG/1
5 TABLET ORAL DAILY
Qty: 30 TABLET | Refills: 0 | OUTPATIENT
Start: 2022-05-24 | End: 2022-06-23

## 2022-05-24 NOTE — TELEPHONE ENCOUNTER
Hello,  We are just checking on the status of this refill request. The patient was supposed to send a message to the doctor with updated BP readings from home. Not sure if she has done this yet. Please verify and send new script to the pharmacy. Thanks!    Funmi Ghosh [facility]  Phone: [phone]  Fax: [phone]

## 2022-06-01 ENCOUNTER — PATIENT OUTREACH (OUTPATIENT)
Dept: FAMILY MEDICINE | Facility: CLINIC | Age: 57
End: 2022-06-01

## 2022-06-01 NOTE — TELEPHONE ENCOUNTER
Patient Quality Outreach    Patient is due for the following:   Colon Cancer Screening -  Colonoscopy  Breast Cancer Screening - Mammogram    NEXT STEPS:   Schedule Colonoscopy and Mammogram     Type of outreach:    Sent [employer] message.    Next Steps:  Reach out within 90 days via Letter.    Max number of attempts reached: No. Will try again in 90 days if patient still on fail list.    LXIONG[employer], MEDICAL ASSISTANT

## 2022-06-01 NOTE — LETTER
July 6, 2022      Charlene Morales  [address] [place] [address]      Your healthcare team cares about your health. To provide you with the best care,   we have reviewed your chart and based on our findings, we see that you are due to:     - BREAST CANCER SCREENING:  Schedule Annual Mammogram. [facility] scheduling number - [phone] or schedule in MyChart (self referall)  - COLON CANCER SCREENING:  Call or mychart the clinic to schedule your colonoscopy or schedule/ your FIT Test, or Cologuard test    If you have already completed these items, please contact the clinic via phone or   Mychart so your care team can review and update your records. Thank you for   choosing [facility] for your healthcare needs. For any questions,   concerns, or to schedule an appointment please contact the clinic.       Healthy Regards,      Your [facility] Care Team                  Sincerely,        Luisa Hall MD

## 2022-06-28 ENCOUNTER — TELEPHONE (OUTPATIENT)
Dept: FAMILY MEDICINE | Facility: CLINIC | Age: 57
End: 2022-06-28

## 2022-06-28 DIAGNOSIS — F33.1 MAJOR DEPRESSIVE DISORDER, RECURRENT EPISODE, MODERATE (H): ICD-10-CM

## 2022-06-28 DIAGNOSIS — F41.8 SITUATIONAL ANXIETY: ICD-10-CM

## 2022-06-28 DIAGNOSIS — F51.01 PRIMARY INSOMNIA: Primary | ICD-10-CM

## 2022-06-28 DIAGNOSIS — F32.1 MAJOR DEPRESSIVE DISORDER, SINGLE EPISODE, MODERATE (H): ICD-10-CM

## 2022-06-28 RX ORDER — GABAPENTIN 300 MG/1
600 CAPSULE ORAL 2 TIMES DAILY
Qty: 360 CAPSULE | Refills: 3 | Status: SHIPPED | OUTPATIENT
Start: 2022-06-28 | End: 2023-08-02

## 2022-06-28 RX ORDER — TRAZODONE HYDROCHLORIDE 100 MG/1
200 TABLET ORAL AT BEDTIME
Qty: 180 TABLET | Refills: 1 | Status: CANCELLED | OUTPATIENT
Start: 2022-06-28

## 2022-06-28 RX ORDER — TRAZODONE HYDROCHLORIDE 100 MG/1
200 TABLET ORAL AT BEDTIME
Qty: 180 TABLET | Refills: 1 | Status: SHIPPED | OUTPATIENT
Start: 2022-06-28 | End: 2023-01-19

## 2022-06-28 NOTE — TELEPHONE ENCOUNTER
There are two instructions in this encounter  1. States to change sig to take 1-2 tabs at night   2. States to change sig to 2 tabs at night.    Could you please clarify?    Thank you,  Luisa Hall MD,MPH

## 2022-06-28 NOTE — TELEPHONE ENCOUNTER
"Reason for Call:  Other     Detailed comments:  Patients latest script for Trazadone had changed on the \"sig\"  Sig: TAKE ONE TABLET BY MOUTH EVERY NIGHT AT BEDTIME.-- the sig used to say take 1 - 2 tablets every night at bedtime. Patient is still taking 2 tablets - so she is needing a new script sent over to correct the sig.    Phone Number Patient can be reached at: Home number on file [phone] (home)    Best Time: any    Can we leave a detailed message on this number? YES    Call taken on 6/28/2022 at 10:41 AM by Crystal Garcia      "

## 2022-06-28 NOTE — TELEPHONE ENCOUNTER
Routed to Dr. Boggs who sent last Rx for trazodone to address patient's request to have sig on trazodone changed to 2 tablets nightly.    Patient was seen 5/3/22 for routine visit by Dr. Boggs.    Updated PCP on chart.    Xenia Frankel RN  [facility]

## 2022-06-28 NOTE — TELEPHONE ENCOUNTER
Medication sent to the pharmacy.  Kindly let her know that the high dose of trazadone can interact with her Paroxetine causing the following   symptoms of serotonin syndrome/serotonin toxicity (eg, hyperreflexia, clonus, hyperthermia, diaphoresis, tremor, autonomic instability, mental status changes)     Luisa Hall MD,MPH

## 2022-06-28 NOTE — TELEPHONE ENCOUNTER
Attempted to call patient at home/mobile number, no answer, left message on voicemail; patient was instructed to return call to [facility] at [phone].    Xenia Frankel RN  [facility]

## 2022-06-28 NOTE — TELEPHONE ENCOUNTER
Reason for Call:  Medication or medication refill:    Do you use a [facility]?  Name of the pharmacy and phone number for the current request:  [facility]    Name of the medication requested: gabapentin (NEURONTIN) 300 MG capsule    Other request:     Can we leave a detailed message on this number? YES    Phone number patient can be reached at: Home number on file [phone] (home)    Best Time: any    Call taken on 6/28/2022 at 10:39 AM by Crystal Garcia

## 2022-06-28 NOTE — TELEPHONE ENCOUNTER
I teed gabapentin Rx, patient was seen for routine visit in May.    Requested Prescriptions   Pending Prescriptions Disp Refills     gabapentin (NEURONTIN) 300 MG capsule 360 capsule 3     Sig: Take 2 capsules (600 mg) by mouth 2 times daily       There is no refill protocol information for this order        Routing refill request to provider for review/approval because:  Drug not on the [facility] refill protocol     Xenia Frankel RN  [facility]

## 2022-06-29 NOTE — TELEPHONE ENCOUNTER
Spoke with pt and notified of below. Verbalized understanding.     Thanks,  Mitali RN  [facility]

## 2022-07-06 NOTE — TELEPHONE ENCOUNTER
Patient Quality Outreach     Patient is due for the following:   Colon Cancer Screening -  Colonoscopy  Breast Cancer Screening - Mammogram     NEXT STEPS:   Schedule Colonoscopy and Mammogram      Type of outreach:    Sent Letter.          Max number of attempts reached: Yes. Will try again in 90 days if patient still on fail list.    Closing Encounter.     LXIONG3, MEDICAL ASSISTANT

## 2022-07-11 ENCOUNTER — E-VISIT (OUTPATIENT)
Dept: FAMILY MEDICINE | Facility: CLINIC | Age: 57
End: 2022-07-11
Payer: COMMERCIAL

## 2022-07-11 DIAGNOSIS — M79.89 SWELLING OF LIMB: Primary | ICD-10-CM

## 2022-07-11 PROCEDURE — 99422 OL DIG E/M SVC 11-20 MIN: CPT | Performed by: STUDENT IN AN ORGANIZED HEALTH CARE EDUCATION/TRAINING PROGRAM

## 2022-07-12 ENCOUNTER — TELEPHONE (OUTPATIENT)
Dept: FAMILY MEDICINE | Facility: CLINIC | Age: 57
End: 2022-07-12

## 2022-07-12 NOTE — PATIENT INSTRUCTIONS
Thank you for choosing us for your care. Based on your symptoms and length of illness, I do not think that you need a prescription at this time.  Please follow the care advise I've provided and use the over the counter medications to help relieve your symptoms.   View your full visit summary for details by clicking on the link below.     If you're not feeling better within 2-3 days, please respond to this message and we can consider if a prescription is needed.  You can schedule an appointment right here in [facility], or call [phone]  If the visit is for the same symptoms as your eVisit, we'll refund the cost of your eVisit if seen within seven days.

## 2022-07-12 NOTE — TELEPHONE ENCOUNTER
Reason for Call:  Form, our goal is to have forms completed with 72 hours, however, some forms may require a visit or additional information.    Type of letter, form or note:  medical    Who is the form from?: Patient    Where did the form come from: Patient or family brought in       What clinic location was the form placed at?: [place]    Where the form was placed: Box Box/Folder    What number is listed as a contact on the form?:  [phone]       Additional comments:  No     Call taken on 7/12/2022 at 10:43 AM by Petra Low

## 2022-08-11 DIAGNOSIS — I10 HTN, GOAL BELOW 130/80: ICD-10-CM

## 2022-08-11 DIAGNOSIS — F32.1 MAJOR DEPRESSIVE DISORDER, SINGLE EPISODE, MODERATE (H): ICD-10-CM

## 2022-08-11 RX ORDER — LISINOPRIL 40 MG/1
TABLET ORAL
Qty: 90 TABLET | Refills: 0 | Status: SHIPPED | OUTPATIENT
Start: 2022-08-11 | End: 2022-11-14

## 2022-08-11 RX ORDER — PAROXETINE 40 MG/1
TABLET, FILM COATED ORAL
Qty: 90 TABLET | Refills: 0 | Status: SHIPPED | OUTPATIENT
Start: 2022-08-11 | End: 2022-11-08

## 2022-08-11 NOTE — TELEPHONE ENCOUNTER
Routing refill request to provider for review/approval because:  BP Readings from Last 3 Encounters:   05/03/22 (!) 142/94   08/27/21 104/70   02/26/20 118/72     Cassie Claudio RN, BSN, PHN  [facility]: [place]

## 2022-09-14 DIAGNOSIS — E78.5 HYPERLIPIDEMIA LDL GOAL <130: ICD-10-CM

## 2022-09-14 DIAGNOSIS — Z13.220 SCREENING FOR LIPID DISORDERS: Primary | ICD-10-CM

## 2022-09-14 DIAGNOSIS — I10 HTN, GOAL BELOW 130/80: ICD-10-CM

## 2022-09-14 NOTE — PROGRESS NOTES
Hello,  This patient has a lab appointment with us 09/15. Can you please place an order for her. There is not an order for any labs.  Thank you,  [facility]

## 2022-09-16 ENCOUNTER — MYC MEDICAL ADVICE (OUTPATIENT)
Dept: FAMILY MEDICINE | Facility: CLINIC | Age: 57
End: 2022-09-16

## 2022-09-16 ENCOUNTER — APPOINTMENT (OUTPATIENT)
Dept: LAB | Facility: CLINIC | Age: 57
End: 2022-09-16
Payer: COMMERCIAL

## 2022-09-16 ENCOUNTER — DOCUMENTATION ONLY (OUTPATIENT)
Dept: LAB | Facility: CLINIC | Age: 57
End: 2022-09-16

## 2022-09-16 DIAGNOSIS — Z02.83 ENCOUNTER FOR DRUG SCREENING: Primary | ICD-10-CM

## 2022-09-16 NOTE — PROGRESS NOTES
Per 9/16 appt notes, Charlene is to have a urine collection for ADD meds.  Please place needed orders or contact pt with further info.

## 2022-09-16 NOTE — TELEPHONE ENCOUNTER
I do not see that she needs urine collection in her chart. I will contact patient to get more information.    Sincerely,    Luisa Hall MD,MPH

## 2022-09-19 ENCOUNTER — LAB (OUTPATIENT)
Dept: LAB | Facility: CLINIC | Age: 57
End: 2022-09-19
Attending: STUDENT IN AN ORGANIZED HEALTH CARE EDUCATION/TRAINING PROGRAM
Payer: COMMERCIAL

## 2022-09-19 DIAGNOSIS — Z02.83 ENCOUNTER FOR DRUG SCREENING: ICD-10-CM

## 2022-09-19 DIAGNOSIS — I10 HTN, GOAL BELOW 130/80: ICD-10-CM

## 2022-09-19 DIAGNOSIS — R79.89 ELEVATED SERUM CREATININE: ICD-10-CM

## 2022-09-19 DIAGNOSIS — E78.5 HYPERLIPIDEMIA LDL GOAL <130: ICD-10-CM

## 2022-09-19 LAB
AMPHETAMINES UR QL: DETECTED
BARBITURATES UR QL SCN: NOT DETECTED
BENZODIAZ UR QL SCN: NOT DETECTED
BUPRENORPHINE UR QL: NOT DETECTED
CANNABINOIDS UR QL: DETECTED
COCAINE UR QL SCN: NOT DETECTED
D-METHAMPHET UR QL: NOT DETECTED
METHADONE UR QL SCN: NOT DETECTED
OPIATES UR QL SCN: NOT DETECTED
OXYCODONE UR QL SCN: NOT DETECTED
PCP UR QL SCN: NOT DETECTED
PROPOXYPH UR QL: NOT DETECTED
TRICYCLICS UR QL SCN: NOT DETECTED

## 2022-09-19 PROCEDURE — 80061 LIPID PANEL: CPT

## 2022-09-19 PROCEDURE — 82043 UR ALBUMIN QUANTITATIVE: CPT

## 2022-09-19 PROCEDURE — 80306 DRUG TEST PRSMV INSTRMNT: CPT

## 2022-09-19 PROCEDURE — 80048 BASIC METABOLIC PNL TOTAL CA: CPT

## 2022-09-19 PROCEDURE — 36415 COLL VENOUS BLD VENIPUNCTURE: CPT

## 2022-09-20 DIAGNOSIS — Z79.899 ENCOUNTER FOR LONG-TERM (CURRENT) USE OF MEDICATIONS: Primary | ICD-10-CM

## 2022-09-20 LAB
ANION GAP SERPL CALCULATED.3IONS-SCNC: 4 MMOL/L (ref 3–14)
BUN SERPL-MCNC: 24 MG/DL (ref 7–30)
CALCIUM SERPL-MCNC: 8.8 MG/DL (ref 8.5–10.1)
CHLORIDE BLD-SCNC: 108 MMOL/L (ref 94–109)
CHOLEST SERPL-MCNC: 196 MG/DL
CO2 SERPL-SCNC: 28 MMOL/L (ref 20–32)
CREAT SERPL-MCNC: 0.96 MG/DL (ref 0.52–1.04)
CREAT UR-MCNC: 109 MG/DL
FASTING STATUS PATIENT QL REPORTED: NO
GFR SERPL CREATININE-BSD FRML MDRD: 70 ML/MIN/1.73M2
GLUCOSE BLD-MCNC: 95 MG/DL (ref 70–99)
HDLC SERPL-MCNC: 57 MG/DL
LDLC SERPL CALC-MCNC: 82 MG/DL
MICROALBUMIN UR-MCNC: 6 MG/L
MICROALBUMIN/CREAT UR: 5.5 MG/G CR (ref 0–25)
NONHDLC SERPL-MCNC: 139 MG/DL
POTASSIUM BLD-SCNC: 3.9 MMOL/L (ref 3.4–5.3)
SODIUM SERPL-SCNC: 140 MMOL/L (ref 133–144)
TRIGL SERPL-MCNC: 284 MG/DL

## 2022-09-21 ENCOUNTER — MYC MEDICAL ADVICE (OUTPATIENT)
Dept: FAMILY MEDICINE | Facility: CLINIC | Age: 57
End: 2022-09-21

## 2022-10-12 DIAGNOSIS — I10 HTN, GOAL BELOW 130/80: ICD-10-CM

## 2022-10-18 RX ORDER — AMLODIPINE BESYLATE 10 MG/1
10 TABLET ORAL DAILY
Qty: 30 TABLET | Refills: 0 | Status: SHIPPED | OUTPATIENT
Start: 2022-10-18 | End: 2022-11-22

## 2022-10-22 ENCOUNTER — HEALTH MAINTENANCE LETTER (OUTPATIENT)
Age: 57
End: 2022-10-22

## 2022-11-03 DIAGNOSIS — F32.1 MAJOR DEPRESSIVE DISORDER, SINGLE EPISODE, MODERATE (H): ICD-10-CM

## 2022-11-08 RX ORDER — PAROXETINE 40 MG/1
TABLET, FILM COATED ORAL
Qty: 90 TABLET | Refills: 0 | Status: SHIPPED | OUTPATIENT
Start: 2022-11-08 | End: 2023-02-09

## 2022-11-14 DIAGNOSIS — I10 HTN, GOAL BELOW 130/80: ICD-10-CM

## 2022-11-15 RX ORDER — LISINOPRIL 40 MG/1
40 TABLET ORAL DAILY
Qty: 30 TABLET | Refills: 0 | Status: SHIPPED | OUTPATIENT
Start: 2022-11-15 | End: 2022-12-13

## 2022-11-19 DIAGNOSIS — I10 HTN, GOAL BELOW 130/80: ICD-10-CM

## 2022-11-22 RX ORDER — AMLODIPINE BESYLATE 10 MG/1
TABLET ORAL
Qty: 30 TABLET | Refills: 0 | Status: SHIPPED | OUTPATIENT
Start: 2022-11-22 | End: 2022-12-27

## 2022-12-11 DIAGNOSIS — I10 HTN, GOAL BELOW 130/80: ICD-10-CM

## 2022-12-12 DIAGNOSIS — I10 HTN, GOAL BELOW 130/80: ICD-10-CM

## 2022-12-12 RX ORDER — LISINOPRIL 40 MG/1
TABLET ORAL
Qty: 30 TABLET | Refills: 0 | OUTPATIENT
Start: 2022-12-12

## 2022-12-12 NOTE — TELEPHONE ENCOUNTER
Pt states she has made an appt for the end of the week.  Pt has 2 tablets left would like a refill to get her to her appt.     Thank you,  Evon Perez [facility]

## 2022-12-13 RX ORDER — LISINOPRIL 40 MG/1
40 TABLET ORAL DAILY
Qty: 30 TABLET | Refills: 0 | Status: SHIPPED | OUTPATIENT
Start: 2022-12-13 | End: 2022-12-27

## 2022-12-13 RX ORDER — LISINOPRIL 40 MG/1
40 TABLET ORAL DAILY
Qty: 30 TABLET | Refills: 0 | OUTPATIENT
Start: 2022-12-13

## 2022-12-23 DIAGNOSIS — I10 HTN, GOAL BELOW 130/80: ICD-10-CM

## 2022-12-23 RX ORDER — AMLODIPINE BESYLATE 10 MG/1
TABLET ORAL
Qty: 30 TABLET | Refills: 0 | OUTPATIENT
Start: 2022-12-23

## 2022-12-23 NOTE — TELEPHONE ENCOUNTER
Patient was advised to make an appointment when she requested for refill on Lisinopril. She made the appointment, however, she cancelled the appointment. No further refill until she is seen.

## 2022-12-26 NOTE — TELEPHONE ENCOUNTER
"Patient is scheduled for 1/30/23.    I called and spoke to patient, advised her Dr. Boggs won't refill her amlodipine until she is actually seen.    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   She is out of her med so hoping we can get her in while she is off school this week.    Scheduled in \"approval req\" spot as she is out of med, is primary patient with Dr. Boggs.    Xenia Frankel RN  [facility]          "

## 2022-12-27 ENCOUNTER — OFFICE VISIT (OUTPATIENT)
Dept: FAMILY MEDICINE | Facility: CLINIC | Age: 57
End: 2022-12-27
Payer: COMMERCIAL

## 2022-12-27 ENCOUNTER — MYC MEDICAL ADVICE (OUTPATIENT)
Dept: FAMILY MEDICINE | Facility: CLINIC | Age: 57
End: 2022-12-27

## 2022-12-27 VITALS
WEIGHT: 179 LBS | SYSTOLIC BLOOD PRESSURE: 126 MMHG | DIASTOLIC BLOOD PRESSURE: 80 MMHG | RESPIRATION RATE: 18 BRPM | HEIGHT: 65 IN | HEART RATE: 92 BPM | TEMPERATURE: 98.6 F | OXYGEN SATURATION: 99 % | BODY MASS INDEX: 29.82 KG/M2

## 2022-12-27 DIAGNOSIS — M79.18 PAIN IN RIGHT BUTTOCK: ICD-10-CM

## 2022-12-27 DIAGNOSIS — I10 HTN, GOAL BELOW 130/80: Primary | ICD-10-CM

## 2022-12-27 DIAGNOSIS — Z23 ENCOUNTER FOR ADMINISTRATION OF VACCINE: ICD-10-CM

## 2022-12-27 DIAGNOSIS — M25.551 HIP PAIN, RIGHT: ICD-10-CM

## 2022-12-27 DIAGNOSIS — Z78.9 NON-SMOKER: ICD-10-CM

## 2022-12-27 DIAGNOSIS — M54.41 CHRONIC RIGHT-SIDED LOW BACK PAIN WITH RIGHT-SIDED SCIATICA: ICD-10-CM

## 2022-12-27 DIAGNOSIS — G89.29 CHRONIC RIGHT-SIDED LOW BACK PAIN WITH RIGHT-SIDED SCIATICA: ICD-10-CM

## 2022-12-27 PROCEDURE — 90471 IMMUNIZATION ADMIN: CPT | Performed by: STUDENT IN AN ORGANIZED HEALTH CARE EDUCATION/TRAINING PROGRAM

## 2022-12-27 PROCEDURE — 90682 RIV4 VACC RECOMBINANT DNA IM: CPT | Performed by: STUDENT IN AN ORGANIZED HEALTH CARE EDUCATION/TRAINING PROGRAM

## 2022-12-27 PROCEDURE — 0134A COVID-19 VACCINE BIVALENT BOOSTER 18+ (MODERNA): CPT | Performed by: STUDENT IN AN ORGANIZED HEALTH CARE EDUCATION/TRAINING PROGRAM

## 2022-12-27 PROCEDURE — 99214 OFFICE O/P EST MOD 30 MIN: CPT | Mod: 25 | Performed by: STUDENT IN AN ORGANIZED HEALTH CARE EDUCATION/TRAINING PROGRAM

## 2022-12-27 PROCEDURE — 91313 COVID-19 VACCINE BIVALENT BOOSTER 18+ (MODERNA): CPT | Performed by: STUDENT IN AN ORGANIZED HEALTH CARE EDUCATION/TRAINING PROGRAM

## 2022-12-27 RX ORDER — LISINOPRIL 40 MG/1
40 TABLET ORAL DAILY
Qty: 90 TABLET | Refills: 2 | Status: SHIPPED | OUTPATIENT
Start: 2022-12-27 | End: 2023-10-02

## 2022-12-27 RX ORDER — AMLODIPINE BESYLATE 10 MG/1
TABLET ORAL
Qty: 90 TABLET | Refills: 3 | Status: SHIPPED | OUTPATIENT
Start: 2022-12-27 | End: 2024-01-17

## 2022-12-27 RX ORDER — DEXTROAMPHETAMINE SULFATE, DEXTROAMPHETAMINE SACCHARATE, AMPHETAMINE SULFATE AND AMPHETAMINE ASPARTATE 7.5; 7.5; 7.5; 7.5 MG/1; MG/1; MG/1; MG/1
CAPSULE, EXTENDED RELEASE ORAL
COMMUNITY
Start: 2022-12-07 | End: 2024-02-16

## 2022-12-27 RX ORDER — CX-024414 0.2 MG/ML
INJECTION, SUSPENSION INTRAMUSCULAR
COMMUNITY
Start: 2022-01-21 | End: 2022-12-27

## 2022-12-27 ASSESSMENT — PATIENT HEALTH QUESTIONNAIRE - PHQ9
10. IF YOU CHECKED OFF ANY PROBLEMS, HOW DIFFICULT HAVE THESE PROBLEMS MADE IT FOR YOU TO DO YOUR WORK, TAKE CARE OF THINGS AT HOME, OR GET ALONG WITH OTHER PEOPLE: NOT DIFFICULT AT ALL
SUM OF ALL RESPONSES TO PHQ QUESTIONS 1-9: 0
SUM OF ALL RESPONSES TO PHQ QUESTIONS 1-9: 0

## 2022-12-27 NOTE — TELEPHONE ENCOUNTER
RN spoke with patient to try to reschedule  - She reports she is going to keep today's appointment instead.    Salome Pabon RN on 12/27/2022 at 11:58 AM

## 2022-12-27 NOTE — PROGRESS NOTES
"  Assessment & Plan     1. HTN, goal below 130/80  Controlled  - amLODIPine (NORVASC) 10 MG tablet; TAKE ONE TABLET BY MOUTH ONCE DAILY.  Dispense: 90 tablet; Refill: 3  - lisinopril (ZESTRIL) 40 MG tablet; Take 1 tablet (40 mg) by mouth daily  Dispense: 90 tablet; Refill: 2    2. Encounter for administration of vaccine  COVID and flu vaccine    3. Hip pain, right  Recommend RICE, Tylenol  - XR Lumbar Spine 2/3 Views; Future  - XR Pelvis w Hip Right G/E 2 Views; Future  - Physical Therapy Referral; Future    4. Chronic right-sided low back pain with right-sided sciatica    - XR Lumbar Spine 2/3 Views; Future  - XR Pelvis w Hip Right G/E 2 Views; Future  - Physical Therapy Referral; Future    5. Pain in right buttock    - XR Lumbar Spine 2/3 Views; Future  - Physical Therapy Referral; Future  #6 non-smoker    Return in about 5 months (around 5/27/2023) for in person, Follow up, Routine preventive, hypertension.    Luisa Hall MD  [facility] TERESSA Chang is a 57 year old, presenting for the following health issues:  Musculoskeletal Problem (3), Recheck Medication (2), and Hypertension (1)      Musculoskeletal Problem    History of Present Illness       Hypertension: She presents for follow up of hypertension.  She does not check blood pressure  regularly outside of the clinic. Outpatient blood pressures have not been over 140/90. She follows a low salt diet.      Today's PHQ-9         PHQ-9 Total Score: 0    PHQ-9 Q9 Thoughts of better off dead/self-harm past 2 weeks :   Not at all    How difficult have these problems made it for you to do your work, take care of things at home, or get along with other people: Not difficult at all  Patient is a non-smoker.    Patient has right buttock pain that radiates to her thigh.  She denies any bladder dysfunction or saddle anesthesia.  Symptom has been ongoing for 6 months.  She describes the pain as \"annoying\" the pain is intermittent.  " "No aggravating or provocating factors.  She has taken Aleve occasionally to help with pain.  She denies any injury.  Review of Systems   Constitutional, HEENT, cardiovascular, pulmonary, gi and gu systems are negative, except as otherwise noted.      Objective    /80 (BP Location: Left arm, Patient Position: Chair, Cuff Size: Adult Large)   Pulse 92   Temp 98.6  F (37  C) (Temporal)   Resp 18   Ht 1.645 m (5' 4.76\")   Wt 81.2 kg (179 lb)   SpO2 99%   BMI 30.01 kg/m    Body mass index is 30.01 kg/m .  Physical Exam   GENERAL: healthy, alert and no distress  RESP: No audible wheezes  CV: Equal chest rise  MS: no gross musculoskeletal defects noted, no edema, negative straight leg test,-BRAVO, normal spine flexion and extension              "

## 2022-12-27 NOTE — PATIENT INSTRUCTIONS
Aly Chang,    Thank you for allowing [facility] to manage your care.      I ordered some xrays, please go to our radiology department to get your xrays.    I sent your prescriptions to your pharmacy.      For your convenience, test results are released as soon as they are available  Please allow 1-2 business days for me to send you a comment about your results.  If not done so, I encourage you to login into [employer] ([email or web address] to review your results in real time.     If you have any questions or concerns, please feel free to call us at [phone].    Sincerely,    Dr. Hall    Did you know?      You can schedule a video visit for follow-up appointments as well as future appointments for certain conditions.  Please see the below link.     [email or web address]    If you have not already done so,  I encourage you to sign up for [employer] ([email or web address]).  This will allow you to review your results, securely communicate with a provider, and schedule virtual visits as well.

## 2023-01-18 DIAGNOSIS — F51.01 PRIMARY INSOMNIA: ICD-10-CM

## 2023-01-18 DIAGNOSIS — F33.1 MAJOR DEPRESSIVE DISORDER, RECURRENT EPISODE, MODERATE (H): ICD-10-CM

## 2023-01-19 RX ORDER — TRAZODONE HYDROCHLORIDE 100 MG/1
TABLET ORAL
Qty: 180 TABLET | Refills: 1 | Status: SHIPPED | OUTPATIENT
Start: 2023-01-19 | End: 2023-07-03

## 2023-02-09 DIAGNOSIS — F32.1 MAJOR DEPRESSIVE DISORDER, SINGLE EPISODE, MODERATE (H): ICD-10-CM

## 2023-02-09 RX ORDER — PAROXETINE 40 MG/1
TABLET, FILM COATED ORAL
Qty: 90 TABLET | Refills: 0 | Status: SHIPPED | OUTPATIENT
Start: 2023-02-09 | End: 2023-05-04

## 2023-04-01 ENCOUNTER — HEALTH MAINTENANCE LETTER (OUTPATIENT)
Age: 58
End: 2023-04-01

## 2023-04-20 ENCOUNTER — PATIENT OUTREACH (OUTPATIENT)
Dept: CARE COORDINATION | Facility: CLINIC | Age: 58
End: 2023-04-20
Payer: COMMERCIAL

## 2023-04-24 DIAGNOSIS — E78.5 HYPERLIPIDEMIA LDL GOAL <130: ICD-10-CM

## 2023-04-25 RX ORDER — ATORVASTATIN CALCIUM 40 MG/1
TABLET, FILM COATED ORAL
Qty: 90 TABLET | Refills: 0 | Status: SHIPPED | OUTPATIENT
Start: 2023-04-25 | End: 2023-08-01

## 2023-05-04 DIAGNOSIS — F32.1 MAJOR DEPRESSIVE DISORDER, SINGLE EPISODE, MODERATE (H): ICD-10-CM

## 2023-05-04 DIAGNOSIS — K27.9 PEPTIC ULCER: ICD-10-CM

## 2023-05-04 RX ORDER — PAROXETINE 40 MG/1
TABLET, FILM COATED ORAL
Qty: 90 TABLET | Refills: 0 | Status: SHIPPED | OUTPATIENT
Start: 2023-05-04 | End: 2023-08-01

## 2023-05-04 NOTE — TELEPHONE ENCOUNTER
Appears patient has established with new PCP.  I'll will route for consideration of refill.    Thanks,    Kuldip Pickering MD

## 2023-05-27 ENCOUNTER — VIRTUAL VISIT (OUTPATIENT)
Dept: URGENT CARE | Facility: CLINIC | Age: 58
End: 2023-05-27
Payer: COMMERCIAL

## 2023-05-27 ENCOUNTER — NURSE TRIAGE (OUTPATIENT)
Dept: NURSING | Facility: CLINIC | Age: 58
End: 2023-05-27
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
## 2023-05-27 DIAGNOSIS — S30.861A INSECT BITE OF ABDOMINAL WALL, INITIAL ENCOUNTER: Primary | ICD-10-CM

## 2023-05-27 PROCEDURE — 99213 OFFICE O/P EST LOW 20 MIN: CPT | Mod: 93

## 2023-05-27 NOTE — PROGRESS NOTES
"  Charlene Morales is a 58 year old female who is being evaluated via a billable video visit.      The patient has been notified of following at the time of scheduling video visit:     \"This video visit will be conducted via a video call between you and your physician/provider. We have found that certain health care needs can be provided without the need for a physical exam.  This service lets us provide the care you need with a video conversation.  If a prescription is necessary we can send it directly to your pharmacy.  If lab work is needed we can place an order for that and you can then stop by our lab to have the test done at a later time.\"   Patient has given consent for video visit?  YES    SUBJECTIVE:  Charlene Morales is an 58 year old female who presents for a bug bite but not sure what bit her.  A little itching and redness, but seems to be improving.  A couple weeks ago she found a tick on her abdomen and removed it.  No redness at that site.  Has felt a little dizzy a couple times.  Some loose stools.  No fevers.  No increased warmth or tenderness at bite site.  No n/v.  No cough or runny nose.  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  Overall is not too concerned about her sxs but wanted to talk about lyme disease as she read about it online.      PMH:   has a past medical history of Anemia, unspecified, CONVULSIONS, OTHER (1/8/2006), Depressive disorder, not elsewhere classified, Headache(784.0), Other and unspecified alcohol dependence, unspecified drinking behavior, Other convulsions, Peptic ulcer, unspecified site, unspecified as acute or chronic, without mention of hemorrhage, perforation, or obstruction, Premenstrual tension syndromes, RENAL CYST (6/30/2005), Unspecified essential hypertension, Unspecified gastritis and gastroduodenitis without mention of hemorrhage, and Unspecified hemorrhoids without mention of " complication.  Patient Active Problem List   Diagnosis     Hypertension, benign essential, goal below 140/90     Peptic ulcer     Headache     BARBITUATE  DEPENDENCE      IRRITABLE BLADDER     CYSTIC ACNE     Weight gain     Health Care Home     HCH     Hyperlipidemia LDL goal <130     History of  addiction  in treatment 7 year ago      Major depressive disorder, recurrent episode, moderate (H)     Primary insomnia     Situational anxiety     Social History     Socioeconomic History     Marital status:    Tobacco Use     Smoking status: Never     Smokeless tobacco: Never   Vaping Use     Vaping status: Never Used   Substance and Sexual Activity     Alcohol use: Yes     Comment: 3-4 drinks a week at most- wine      Drug use: No     Sexual activity: Yes     Partners: Male     Birth control/protection: Female Surgical     Comment: hysterectomy   Other Topics Concern     Parent/sibling w/ CABG, MI or angioplasty before 65F 55M? Yes     Family History   Problem Relation Age of Onset     Arthritis Mother      Hypertension Mother      Heart Disease Father      Diabetes Father      Lipids Father      Alcohol/Drug Father      C.A.D. Father         open heart surgery at age 50     Cerebrovascular Disease Father      Abdominal Aortic Aneurysm Father      Alzheimer Disease Maternal Grandfather      Colon Cancer Maternal Grandfather         age 65     Cerebrovascular Disease Maternal Grandfather      Heart Disease Paternal Grandfather      Thyroid Disease Sister      Hypertension Sister      Hypertension Sister      Hypertension Maternal Grandmother      Breast Cancer No family hx of        ALLERGIES:  Sulfa antibiotics    Current Outpatient Medications   Medication     ADDERALL XR 30 MG 24 hr capsule     amLODIPine (NORVASC) 10 MG tablet     atorvastatin (LIPITOR) 40 MG tablet     Blood Pressure Monitoring (BLOOD PRESSURE CUFF) MISC     buPROPion (WELLBUTRIN XL) 300 MG 24 hr tablet     gabapentin (NEURONTIN) 300 MG  capsule     lisinopril (ZESTRIL) 40 MG tablet     LORazepam (ATIVAN) 0.5 MG tablet     omeprazole (PRILOSEC) 20 MG DR capsule     PARoxetine (PAXIL) 40 MG tablet     traZODone (DESYREL) 100 MG tablet     No current facility-administered medications for this visit.         ROS:  ROS is done and is negative for general/constitutional, eye, ENT, Respiratory, cardiovascular, GI, , Skin, musculoskeletal except as noted elsewhere.  All other review of systems negative except as noted elsewhere.      OBJECTIVE:    No vital signs obtained as is virtual visit    GENERAL: alert and no distress detected  RESP: No audible wheeze, cough.           ASSESSMENT/PLAN:    ASSESSMENT / PLAN:  (S30.861A,  W57.XXXA) Insect bite of abdominal wall, initial encounter  (primary encounter diagnosis)  Comment: prior tick bite which appears fine at that site.  Other insect bite is a little red and itchy.  Plan: discussed lyme disease stages and sxs.  Currently based on hx, does not have rash or redness at site of tick bite that would indicate early lyme disease.  The other bite she has from unknown insect with redness and itching which are improving, sounds typical for insect bite and not for infection at this time.  Discussed lyme disease treatments and testing.  Is too early to test for lyme and pt not want to do treatment at this time.  Is advised to f/u with pcp if her sxs do not resolve over the next 1-2 weeks.          See [facility] for orders, medications, letters, patient instructions    Katie Hinson MD  5/27/2023, 8:56 AM    Video-Visit Details    Changed to telephone visit at pt request.  Time of call 13 minutes.  Time spent: 22 minutes spent during visit, reviewing test results, chart review, and charting on day of encounter

## 2023-05-27 NOTE — TELEPHONE ENCOUNTER
"Nurse Triage SBAR    Is this a 2nd Level Triage? NO    Situation:   Bug bite    Background:   She's concerned for Lyme Disease     Assessment:   Pt has a quarter-size red bug bite on her abdomen x 9 days. Unknown insect.   In the past 24 hours, she's had two episodes of dizziness.   Sore throat x 1 week  Diarrhea x 5 days    Prior to this bug bite on her abdomen, she pulled a \"regular\" sized tick off of her abdomen (she believes it was a different area). She doesn't think it was a deer tick. Tick was attached for less than 24 hours.     She denies a fever, widespread rash, headache today.     Protocol Recommended Disposition:   See a provider today in Urgent Care. Patient verbalized understanding and had no further questions. She is traveling today and prefers a virtual visit. Call warm transferred to scheduling.     Milady Encarnacion RN  [place] Nurse Advisor  5/27/2023 8:44 AM       Reason for Disposition    Red ring or bull's-eye rash occurs at tick bite    Additional Information    Negative: Sounds like a life-threatening emergency to the triager    Negative: [1] 2 to 14 days following tick bite AND [2] severe headache with fever occurs    Negative: [1] 2 to 14 days following tick bite AND [2] widespread rash with fever occurs    Negative: Patient sounds very sick or weak to the triager    Negative: [1] Fever AND [2] spreading red area or streak    Negative: [1] Fever AND [2] area is very tender to touch    Negative: [1] Red streak or red line AND [2] length > 2 inches (5 cm)    Negative: Can't remove live tick (after trying Care Advice)    Negative: [1] 2 to 14 days following tick bite AND [2] fever AND [3] no rash or headache    Negative: [1] 2 to 14 days following tick bite AND [2] widespread rash or headache AND [3] no fever    Negative: [1] Red or very tender (to touch) area AND [2] started over 24 hours after the bite    Protocols used: TICK BITE-A-AH      "

## 2023-06-18 ENCOUNTER — HEALTH MAINTENANCE LETTER (OUTPATIENT)
Age: 58
End: 2023-06-18

## 2023-06-30 DIAGNOSIS — F33.1 MAJOR DEPRESSIVE DISORDER, RECURRENT EPISODE, MODERATE (H): ICD-10-CM

## 2023-06-30 DIAGNOSIS — F51.01 PRIMARY INSOMNIA: ICD-10-CM

## 2023-07-03 RX ORDER — TRAZODONE HYDROCHLORIDE 100 MG/1
TABLET ORAL
Qty: 180 TABLET | Refills: 1 | Status: SHIPPED | OUTPATIENT
Start: 2023-07-03 | End: 2024-01-16

## 2023-07-05 ENCOUNTER — VIRTUAL VISIT (OUTPATIENT)
Dept: FAMILY MEDICINE | Facility: CLINIC | Age: 58
End: 2023-07-05
Payer: COMMERCIAL

## 2023-07-05 DIAGNOSIS — Z12.11 SCREEN FOR COLON CANCER: ICD-10-CM

## 2023-07-05 DIAGNOSIS — F32.1 MAJOR DEPRESSIVE DISORDER, SINGLE EPISODE, MODERATE (H): ICD-10-CM

## 2023-07-05 DIAGNOSIS — Z12.31 VISIT FOR SCREENING MAMMOGRAM: ICD-10-CM

## 2023-07-05 DIAGNOSIS — M79.18 PAIN IN RIGHT BUTTOCK: ICD-10-CM

## 2023-07-05 DIAGNOSIS — I12.9 BENIGN HYPERTENSION WITH CKD (CHRONIC KIDNEY DISEASE) STAGE III (H): ICD-10-CM

## 2023-07-05 DIAGNOSIS — M25.551 HIP PAIN, RIGHT: ICD-10-CM

## 2023-07-05 DIAGNOSIS — M25.531 RIGHT WRIST PAIN: Primary | ICD-10-CM

## 2023-07-05 DIAGNOSIS — N18.30 BENIGN HYPERTENSION WITH CKD (CHRONIC KIDNEY DISEASE) STAGE III (H): ICD-10-CM

## 2023-07-05 PROCEDURE — 99214 OFFICE O/P EST MOD 30 MIN: CPT | Mod: VID | Performed by: STUDENT IN AN ORGANIZED HEALTH CARE EDUCATION/TRAINING PROGRAM

## 2023-07-05 ASSESSMENT — PATIENT HEALTH QUESTIONNAIRE - PHQ9
SUM OF ALL RESPONSES TO PHQ QUESTIONS 1-9: 0
10. IF YOU CHECKED OFF ANY PROBLEMS, HOW DIFFICULT HAVE THESE PROBLEMS MADE IT FOR YOU TO DO YOUR WORK, TAKE CARE OF THINGS AT HOME, OR GET ALONG WITH OTHER PEOPLE: NOT DIFFICULT AT ALL
SUM OF ALL RESPONSES TO PHQ QUESTIONS 1-9: 0

## 2023-07-05 NOTE — PROGRESS NOTES
"Charlene is a 58 year old who is being evaluated via a billable video visit.      How would you like to obtain your AVS? MyChart  If the video visit is dropped, the invitation should be resent by: Text to cell phone: [phone]  Will anyone else be joining your video visit? No          Assessment & Plan     1. Right wrist pain  Recommend RICE and tylenol  - XR Wrist Right G/E 3 Views; Future  - Orthopedic  Referral; Future    2. Pain in right buttock  Patient has done the xray that was ordered at last visit. She has failed home exercise program. She would like to get a MRI.  - MR Sacrum and Coccyx w/o Contrast; Future  - Orthopedic  Referral; Future    3. Hip pain, right    - MR Hip Right w/o Contrast; Future  - Orthopedic  Referral; Future    4. Benign hypertension with CKD (chronic kidney disease) stage III (H)  Continue lisinopril and amlodipine    5. Screen for colon cancer    - Colonoscopy Screening  Referral; Future    6. Visit for screening mammogram    - MA SCREENING DIGITAL BILAT - Future  (s+30); Future    7. Major depressive disorder, single episode, moderate (H)    - PRIMARY CARE FOLLOW-UP SCHEDULING; Future       BMI:   Estimated body mass index is 30.01 kg/m  as calculated from the following:    Height as of 12/27/22: 1.645 m (5' 4.76\").    Weight as of 12/27/22: 81.2 kg (179 lb).           Luisa Hall MD  [facility] [place]    Christi Chang is a 58 year old, presenting for the following health issues:  No chief complaint on file.      History of Present Illness       Reason for visit:  Moderate Hand and Leg pain    She eats 2-3 servings of fruits and vegetables daily.She consumes 0 sweetened beverage(s) daily.She exercises with enough effort to increase her heart rate 30 to 60 minutes per day.  She exercises with enough effort to increase her heart rate 5 days per week.   She is taking medications regularly.    Today's PHQ-9         PHQ-9 " "Total Score: 0    PHQ-9 Q9 Thoughts of better off dead/self-harm past 2 weeks :   Not at all    How difficult have these problems made it for you to do your work, take care of things at home, or get along with other people: Not difficult at all       Patient has right buttock pain that radiates to her thigh.  She denies any bladder dysfunction or saddle anesthesia.  Symptom has been ongoing for a year.  She describes the pain as \"annoying\" the pain is intermittent.  No aggravating or provocating factors.  She has taken Aleve occasionally to help with pain.  She denies any injury. She has do home  Exercise that her PT friend gave her without an improvement.    She has pain at the base of her right thumb that radiates up her thumb. She does a lot of biking and yard work. Pain has been intermittent for years.      Her depression and blood pressure are stable.     Review of Systems   Constitutional, HEENT, cardiovascular, pulmonary, gi and gu systems are negative, except as otherwise noted.      Objective           Vitals:  No vitals were obtained today due to virtual visit.    Physical Exam   GENERAL: Healthy, alert and no distress  EYES: Eyes grossly normal to inspection.  No discharge or erythema, or obvious scleral/conjunctival abnormalities.  RESP: No audible wheeze, cough, or visible cyanosis.  No visible retractions or increased work of breathing.    SKIN: Visible skin clear. No significant rash, abnormal pigmentation or lesions.  PSYCH: Mentation appears normal, affect normal/bright, judgement and insight intact, normal speech and appearance well-groomed.      Video-Visit Details    Type of service:  Video Visit     Originating Location (pt. Location): Home  Distant Location (provider location):  On-site  Platform used for Video Visit: Maira"

## 2023-07-05 NOTE — PATIENT INSTRUCTIONS
Aly Chang,    Thank you for allowing [facility] to manage your care.      I ordered a xray and MRI, please call diagnostic imaging [phone] to schedule your test.    I made a referral, they will be calling in approximately 1 week to set up your appointment.  If you do not hear from them, please call the specialty number on your after visit.       When You Have Low Back Pain    Caring for Your Back  You are not alone.    Low back pain is very common. Nearly half of all adults have low back pain in any given year. The good news is that back pain is rarely a danger to your health. Most people can manage their back pain on their own and about half of them start feeling better within 2 weeks. In 9 out of 10 cases, low back pain goes away or no longer limits daily activity within 6 weeks.     Your outlook is good!    Your symptoms tell us that your low back pain is most likely not a danger to you. Most of the time we do not know the exact cause of low back pain, even if you see a doctor or have an MRI. However, treatment can still work without knowing the cause of the pain. Less than 1 in 100 people need surgery for their back pain.     What can I do about my low back pain?     There are three things you can do to ease low back pain and help it go away.   Use heat or cold packs.   Take medicine as directed.   Use positions, movements and exercises.     Using heat or cold packs    Try cold packs or gentle heat to ease your pain. Use whichever gives you the most relief. Apply the cold pack or heat for 15 minutes at a time, as often as needed.    Taking medicine     If your doctor has prescribed medicine, be sure to follow the directions.   If you take over-the-counter medicine, read and follow the directions.   Talk to your doctor if you have any questions.     Using positions, movements and exercises    Research tells us that moving your joints and muscles can help you recover from back pain. Such activity  should be simple and gentle. Use the positions in the photos as well as walking to help relieve your pain. Try taking a short walk every 3 to 4 hours during the day. Walk for a few minutes inside your home or take longer walks outside, on a treadmill or at a mall. Slowly increase the amount of time you walk. Expect discomfort when you begin, but it should lessen as your back starts to heal. When your back feels better, walk daily to keep your back and body healthy.    Finding a comfortable position    When your back pain is new, certain positions will ease your pain. Gently try each of the positions below until you find one that is helpful. Once you find a position of comfort, use it as often as you like when you are resting. You will recover faster if you combine rest with activity.         Lie on your back with your legs bent. You can do this by placing a pillow under your knees. Or you may lie on the floor and rest your lower legs on the seat of a chair.       Lie on your side with your knees bent, and place a pillow between your knees.       Lie on your stomach over pillows.      When should I call my doctor?    Your back pain should improve over the first couple of weeks. As it improves, you should be able to return to your normal activities. But call your doctor if:   You have a sudden change in your ability to control your bladder or bowels.   You feel tingling in your groin or legs.   The pain spreads down your leg and into your foot.   Your toes, feet or leg muscles feel weak.   You feel generally unwell or sick.   Your pain does not get better or gets worse.      If you are deaf or hard of hearing, please let us know. We provide many free services including sign language interpreters,oral interpreters, TTYs, telephone amplifiers, note takers and written materials.    For informational purposes only. Not to replace the advice of your health care provider. Copyright   2013 [facility]. All  rights reserved. [employer] 417967 - Rev 06/14.    For your convenience, test results are released as soon as they are available  Please allow 1-2 business days for me to send you a comment about your results.  If not done so, I encourage you to login into [employer] ([email or web address] to review your results in real time.     If you have any questions or concerns, please feel free to call us at [phone].    Sincerely,    Dr. Hall    Did you know?      You can schedule a video visit for follow-up appointments as well as future appointments for certain conditions.  Please see the below link.     [email or web address]    If you have not already done so,  I encourage you to sign up for [employer] ([email or web address]).  This will allow you to review your results, securely communicate with a provider, and schedule virtual visits as well.

## 2023-07-10 ENCOUNTER — HOSPITAL ENCOUNTER (OUTPATIENT)
Dept: MRI IMAGING | Facility: CLINIC | Age: 58
Discharge: HOME | End: 2023-07-10
Attending: STUDENT IN AN ORGANIZED HEALTH CARE EDUCATION/TRAINING PROGRAM
Payer: COMMERCIAL

## 2023-07-10 ENCOUNTER — HOSPITAL ENCOUNTER (OUTPATIENT)
Dept: GENERAL RADIOLOGY | Facility: CLINIC | Age: 58
Discharge: HOME | End: 2023-07-10
Attending: STUDENT IN AN ORGANIZED HEALTH CARE EDUCATION/TRAINING PROGRAM
Payer: COMMERCIAL

## 2023-07-10 DIAGNOSIS — M79.18 PAIN IN RIGHT BUTTOCK: ICD-10-CM

## 2023-07-10 DIAGNOSIS — M25.531 RIGHT WRIST PAIN: ICD-10-CM

## 2023-07-10 DIAGNOSIS — M25.551 HIP PAIN, RIGHT: ICD-10-CM

## 2023-07-10 PROCEDURE — 72195 MRI PELVIS W/O DYE: CPT

## 2023-07-10 PROCEDURE — 73110 X-RAY EXAM OF WRIST: CPT | Mod: RT

## 2023-07-10 PROCEDURE — 73721 MRI JNT OF LWR EXTRE W/O DYE: CPT | Mod: RT

## 2023-07-10 PROCEDURE — 73721 MRI JNT OF LWR EXTRE W/O DYE: CPT | Mod: 26 | Performed by: RADIOLOGY

## 2023-07-10 PROCEDURE — 72195 MRI PELVIS W/O DYE: CPT | Mod: 26 | Performed by: RADIOLOGY

## 2023-07-15 ENCOUNTER — NURSE TRIAGE (OUTPATIENT)
Dept: NURSING | Facility: CLINIC | Age: 58
End: 2023-07-15
Payer: COMMERCIAL

## 2023-07-15 ENCOUNTER — TELEPHONE (OUTPATIENT)
Dept: FAMILY MEDICINE | Facility: CLINIC | Age: 58
End: 2023-07-15
Payer: COMMERCIAL

## 2023-07-15 DIAGNOSIS — F32.1 MAJOR DEPRESSIVE DISORDER, SINGLE EPISODE, MODERATE (H): ICD-10-CM

## 2023-07-15 NOTE — TELEPHONE ENCOUNTER
Medication Question or Refill        What medication are you calling about (include dose and sig)?: PARoxetine (PAXIL) 40 MG tablet    Preferred Pharmacy:     [facility] in [place], [place]     [address] [place] [address]    Controlled Substance Agreement on file:   CSA -- Patient Level:    CSA: None found at the patient level.       Who prescribed the medication?: PCP    Do you need a refill? Yes    When did you use the medication last? 7/13    Patient offered an appointment? No7/13    Do you have any questions or concerns?  Yes: Pt left meds at home and she's going to be away for a week and would like a temp refill on this medication      Could we send this information to you in [employer] or would you prefer to receive a phone call?:   Patient would prefer a phone call   Okay to leave a detailed message?: Yes at Cell number on file:    Telephone Information:   Mobile [phone]   Mobile [phone]

## 2023-07-15 NOTE — TELEPHONE ENCOUNTER
Patient calling to request an emergency refill of paroxetine 40mg as she is out of town through Friday (7/21/23) and did not bring her medications with her. Per protocol, FNA cannot page on-call providers for a non-essential medication.     Patient is currently not experiencing any withdrawal symptoms. Advised patient call back if she experiences any severe symptoms such as headache, muscle pain or cramping. Will route message to provider to request a refill be sent to the [facility] in [facility] in [place].     Patient verbalized understanding and agreed with plan.    Anisa Lewis RN  07/15/23 3:47 PM  [facility] Nurse Advisor    Reason for Disposition   [1] Prescription refill request for NON-ESSENTIAL medicine (i.e., no harm to patient if med not taken) AND [2] triager unable to refill per department policy    Protocols used: Medication Refill and Renewal Call-A-

## 2023-07-17 RX ORDER — PAROXETINE 40 MG/1
40 TABLET, FILM COATED ORAL DAILY
Qty: 30 TABLET | Refills: 0 | Status: CANCELLED | OUTPATIENT
Start: 2023-07-17

## 2023-07-17 NOTE — TELEPHONE ENCOUNTER
Called pt to ask which pharmacy she would like this to be sent to as we would need either address or phone to find new pharmacy. Pt stated that she drove up to the [facility] on Sunday to have this medication filled at the pharmacy that it was sent to.     Called pharmacy on file to confirm that PARoxetine (PAXIL) 40 MG tablet was picked up 5/10/23.     Called pt to confirm that she picked up her prescription that she had at home.     Pt advised to call clinic if she needs a refill while in [place] and to provider either the pharmacy address or phone number. Pt verbalized understanding and stated no further questions.     Rhoda Mccabe RN

## 2023-07-25 ENCOUNTER — TRANSFERRED RECORDS (OUTPATIENT)
Dept: HEALTH INFORMATION MANAGEMENT | Facility: CLINIC | Age: 58
End: 2023-07-25
Payer: COMMERCIAL

## 2023-08-01 DIAGNOSIS — F32.1 MAJOR DEPRESSIVE DISORDER, SINGLE EPISODE, MODERATE (H): ICD-10-CM

## 2023-08-01 DIAGNOSIS — E78.5 HYPERLIPIDEMIA LDL GOAL <130: ICD-10-CM

## 2023-08-01 RX ORDER — ATORVASTATIN CALCIUM 40 MG/1
TABLET, FILM COATED ORAL
Qty: 90 TABLET | Refills: 0 | Status: SHIPPED | OUTPATIENT
Start: 2023-08-01 | End: 2023-10-31

## 2023-08-01 RX ORDER — PAROXETINE 40 MG/1
TABLET, FILM COATED ORAL
Qty: 90 TABLET | Refills: 0 | Status: SHIPPED | OUTPATIENT
Start: 2023-08-01 | End: 2023-10-24

## 2023-08-02 DIAGNOSIS — F41.8 SITUATIONAL ANXIETY: ICD-10-CM

## 2023-08-02 DIAGNOSIS — F32.1 MAJOR DEPRESSIVE DISORDER, SINGLE EPISODE, MODERATE (H): ICD-10-CM

## 2023-08-02 RX ORDER — GABAPENTIN 300 MG/1
600 CAPSULE ORAL 2 TIMES DAILY
Qty: 360 CAPSULE | Refills: 3 | Status: SHIPPED | OUTPATIENT
Start: 2023-08-02 | End: 2024-09-09

## 2023-08-02 NOTE — TELEPHONE ENCOUNTER
"Requested Prescriptions   Pending Prescriptions Disp Refills     traZODone (DESYREL) 100 MG tablet [Pharmacy Med Name: TRAZODONE HCL 100MG TABS] 60 tablet 0    Last Written Prescription Date:  04/11/2018 #60 x 0  Last filled 04/16/2018  Last office visit: 4/10/2018 AISHA Vázquez   Future Office Visit:  None   Sig: TAKE ONE OR TWO TABLETS BY MOUTH EVERY NIGHT AT BEDTIME    Serotonin Modulators Passed    5/18/2018  8:53 AM       Passed - Recent (12 mo) or future (30 days) visit within the authorizing provider's specialty    Patient had office visit in the last 12 months or has a visit in the next 30 days with authorizing provider or within the authorizing provider's specialty.  See \"Patient Info\" tab in inbasket, or \"Choose Columns\" in Meds & Orders section of the refill encounter.           Passed - Patient is age 18 or older       Passed - No active pregnancy on record       Passed - No positive pregnancy test in past 12 months          " Oxybutynin Counseling:  I discussed with the patient the risks of oxybutynin including but not limited to skin rash, drowsiness, dry mouth, difficulty urinating, and blurred vision.

## 2023-08-28 ENCOUNTER — TELEPHONE (OUTPATIENT)
Dept: FAMILY MEDICINE | Facility: CLINIC | Age: 58
End: 2023-08-28

## 2023-08-28 NOTE — LETTER
October 17, 2023    Charlene Morales  [address] [place] [address]              Your team at [facility] cares about your health. We have reviewed your chart and based on our findings; we are making the following recommendations to better manage your health.     You are in particular need of attention regarding the following:     Colon Cancer Screening  Breast Cancer Screening - Mammogram  Physical Preventive Adult Physical     Next Steps:   Schedule a Adult Preventative     Type of outreach:    Sent [employer] message. and Sent letter.     Next Steps:  Reach out within 90 days via Phone.     Max number of attempts reached: No. Will try again in 90 days if patient still on fail list.     Questions for provider review:    None    If you have already completed these items, please contact the clinic via phone or   [employer] so your care team can review and update your records. Thank you for   choosing [facility]ics for your healthcare needs. For any questions,   concerns, or to schedule an appointment please contact our clinic.    Healthy Regards,      Your [facility] Care Team

## 2023-08-28 NOTE — LETTER
August 28, 2023      Charlene Morales  [address] [place] [address]          Dear Charlene,     Your team at [facility] cares about your health. We have reviewed your chart and based on our findings; we are making the following recommendations to better manage your health.     You are in particular need of attention regarding the following:     Colon Cancer Screening  Breast Cancer Screening - Mammogram  Physical Preventive Adult Physical    Next Steps:   Schedule a Adult Preventative      If you have already completed these items, please contact the clinic via phone or   [employer] so your care team can review and update your records. Thank you for   choosing [facility] for your healthcare needs. For any questions,   concerns, or to schedule an appointment please contact our clinic.    Healthy Regards,      Your [facility] Care Team            Sincerely,      Luisa Hall MD

## 2023-08-28 NOTE — TELEPHONE ENCOUNTER
Patient Quality Outreach    Patient is due for the following:   Colon Cancer Screening  Breast Cancer Screening - Mammogram  Physical Preventive Adult Physical    Next Steps:   Schedule a Adult Preventative    Type of outreach:    Sent [employer] message. and Sent letter.    Next Steps:  Reach out within 90 days via Phone.    Max number of attempts reached: No. Will try again in 90 days if patient still on fail list.    Questions for provider review:    None           Rogers Lopez MA

## 2023-10-01 DIAGNOSIS — I10 HTN, GOAL BELOW 130/80: ICD-10-CM

## 2023-10-02 RX ORDER — LISINOPRIL 40 MG/1
40 TABLET ORAL DAILY
Qty: 90 TABLET | Refills: 0 | Status: SHIPPED | OUTPATIENT
Start: 2023-10-02 | End: 2024-01-17

## 2023-10-24 ENCOUNTER — TRANSFERRED RECORDS (OUTPATIENT)
Dept: HEALTH INFORMATION MANAGEMENT | Facility: CLINIC | Age: 58
End: 2023-10-24
Payer: COMMERCIAL

## 2023-10-24 DIAGNOSIS — F32.1 MAJOR DEPRESSIVE DISORDER, SINGLE EPISODE, MODERATE (H): ICD-10-CM

## 2023-10-24 RX ORDER — PAROXETINE 40 MG/1
TABLET, FILM COATED ORAL
Qty: 90 TABLET | Refills: 0 | Status: SHIPPED | OUTPATIENT
Start: 2023-10-24 | End: 2024-01-17

## 2023-10-31 DIAGNOSIS — E78.5 HYPERLIPIDEMIA LDL GOAL <130: ICD-10-CM

## 2023-10-31 RX ORDER — ATORVASTATIN CALCIUM 40 MG/1
TABLET, FILM COATED ORAL
Qty: 90 TABLET | Refills: 0 | Status: SHIPPED | OUTPATIENT
Start: 2023-10-31 | End: 2024-01-29

## 2023-11-09 ENCOUNTER — TELEPHONE (OUTPATIENT)
Dept: FAMILY MEDICINE | Facility: CLINIC | Age: 58
End: 2023-11-09

## 2023-11-09 NOTE — LETTER
November 9, 2023    To  Charlene Morales  [address] [place] [address]                Your team at [facility] cares about your health. We have reviewed your chart and based on our findings; we are making the following recommendations to better manage your health.     You are in particular need of attention regarding the following:     Schedule Annual MAMMOGRAPHY. [facility] scheduling number is [phone] or schedule in [employer] (self referral).    If you have already completed these items, please contact the clinic via phone or   [employer] so your care team can review and update your records. Thank you for   choosing [facility] Clinics for your healthcare needs. For any questions,   concerns, or to schedule an appointment please contact our clinic.    Healthy Regards,      Your [facility] Care Team

## 2023-11-09 NOTE — TELEPHONE ENCOUNTER
Patient Quality Outreach    Patient is due for the following:   Breast Cancer Screening - Mammogram    Next Steps:   Schedule a mammogram     Type of outreach:    Sent [employer] message. and Sent letter.      Questions for provider review:    None           Rogers Lopez MA

## 2023-12-11 DIAGNOSIS — Z79.899 ENCOUNTER FOR LONG-TERM (CURRENT) USE OF MEDICATIONS: Primary | ICD-10-CM

## 2024-01-16 DIAGNOSIS — F51.01 PRIMARY INSOMNIA: ICD-10-CM

## 2024-01-16 DIAGNOSIS — F33.1 MAJOR DEPRESSIVE DISORDER, RECURRENT EPISODE, MODERATE (H): ICD-10-CM

## 2024-01-16 DIAGNOSIS — I10 HTN, GOAL BELOW 130/80: ICD-10-CM

## 2024-01-16 DIAGNOSIS — F32.1 MAJOR DEPRESSIVE DISORDER, SINGLE EPISODE, MODERATE (H): ICD-10-CM

## 2024-01-16 RX ORDER — TRAZODONE HYDROCHLORIDE 100 MG/1
TABLET ORAL
Qty: 180 TABLET | Refills: 0 | Status: SHIPPED | OUTPATIENT
Start: 2024-01-16 | End: 2024-02-05

## 2024-01-17 RX ORDER — LISINOPRIL 40 MG/1
40 TABLET ORAL DAILY
Qty: 90 TABLET | Refills: 0 | Status: SHIPPED | OUTPATIENT
Start: 2024-01-17 | End: 2024-04-22

## 2024-01-17 RX ORDER — AMLODIPINE BESYLATE 10 MG/1
TABLET ORAL
Qty: 90 TABLET | Refills: 3 | Status: SHIPPED | OUTPATIENT
Start: 2024-01-17 | End: 2024-08-13

## 2024-01-17 RX ORDER — PAROXETINE 40 MG/1
TABLET, FILM COATED ORAL
Qty: 90 TABLET | Refills: 0 | Status: SHIPPED | OUTPATIENT
Start: 2024-01-17 | End: 2024-04-09

## 2024-01-19 DIAGNOSIS — Z79.899 ENCOUNTER FOR LONG-TERM (CURRENT) USE OF MEDICATIONS: Primary | ICD-10-CM

## 2024-01-28 DIAGNOSIS — E78.5 HYPERLIPIDEMIA LDL GOAL <130: ICD-10-CM

## 2024-01-29 RX ORDER — ATORVASTATIN CALCIUM 40 MG/1
TABLET, FILM COATED ORAL
Qty: 90 TABLET | Refills: 0 | Status: SHIPPED | OUTPATIENT
Start: 2024-01-29 | End: 2024-10-07

## 2024-01-30 ENCOUNTER — OFFICE VISIT (OUTPATIENT)
Dept: FAMILY MEDICINE | Facility: CLINIC | Age: 59
End: 2024-01-30
Attending: STUDENT IN AN ORGANIZED HEALTH CARE EDUCATION/TRAINING PROGRAM
Payer: COMMERCIAL

## 2024-01-30 VITALS
WEIGHT: 152 LBS | TEMPERATURE: 98.3 F | DIASTOLIC BLOOD PRESSURE: 86 MMHG | HEART RATE: 82 BPM | RESPIRATION RATE: 16 BRPM | BODY MASS INDEX: 25.33 KG/M2 | OXYGEN SATURATION: 100 % | HEIGHT: 65 IN | SYSTOLIC BLOOD PRESSURE: 132 MMHG

## 2024-01-30 DIAGNOSIS — Z12.31 ENCOUNTER FOR SCREENING MAMMOGRAM FOR BREAST CANCER: ICD-10-CM

## 2024-01-30 DIAGNOSIS — N18.31 CHRONIC KIDNEY DISEASE, STAGE 3A (H): ICD-10-CM

## 2024-01-30 DIAGNOSIS — Z78.9 NON-SMOKER: ICD-10-CM

## 2024-01-30 DIAGNOSIS — Z12.11 SCREEN FOR COLON CANCER: ICD-10-CM

## 2024-01-30 DIAGNOSIS — F32.1 MAJOR DEPRESSIVE DISORDER, SINGLE EPISODE, MODERATE (H): ICD-10-CM

## 2024-01-30 DIAGNOSIS — F51.01 PRIMARY INSOMNIA: ICD-10-CM

## 2024-01-30 DIAGNOSIS — Z13.1 SCREENING FOR DIABETES MELLITUS: ICD-10-CM

## 2024-01-30 DIAGNOSIS — I12.9 BENIGN HYPERTENSION WITH CKD (CHRONIC KIDNEY DISEASE) STAGE III (H): ICD-10-CM

## 2024-01-30 DIAGNOSIS — N18.30 BENIGN HYPERTENSION WITH CKD (CHRONIC KIDNEY DISEASE) STAGE III (H): ICD-10-CM

## 2024-01-30 DIAGNOSIS — E78.5 HYPERLIPIDEMIA LDL GOAL <130: ICD-10-CM

## 2024-01-30 DIAGNOSIS — F90.0 ATTENTION DEFICIT HYPERACTIVITY DISORDER (ADHD), PREDOMINANTLY INATTENTIVE TYPE: ICD-10-CM

## 2024-01-30 DIAGNOSIS — N81.6 RECTOCELE: ICD-10-CM

## 2024-01-30 DIAGNOSIS — Z00.00 ROUTINE GENERAL MEDICAL EXAMINATION AT A HEALTH CARE FACILITY: Primary | ICD-10-CM

## 2024-01-30 LAB
ALBUMIN SERPL BCG-MCNC: 4.5 G/DL (ref 3.5–5.2)
ALP SERPL-CCNC: 107 U/L (ref 40–150)
ALT SERPL W P-5'-P-CCNC: 31 U/L (ref 0–50)
ANION GAP SERPL CALCULATED.3IONS-SCNC: 10 MMOL/L (ref 7–15)
AST SERPL W P-5'-P-CCNC: 24 U/L (ref 0–45)
BILIRUB SERPL-MCNC: 0.3 MG/DL
BUN SERPL-MCNC: 19.6 MG/DL (ref 6–20)
CALCIUM SERPL-MCNC: 9.7 MG/DL (ref 8.6–10)
CHLORIDE SERPL-SCNC: 105 MMOL/L (ref 98–107)
CHOLEST SERPL-MCNC: 160 MG/DL
CREAT SERPL-MCNC: 0.8 MG/DL (ref 0.51–0.95)
CREAT UR-MCNC: 101 MG/DL
DEPRECATED HCO3 PLAS-SCNC: 27 MMOL/L (ref 22–29)
EGFRCR SERPLBLD CKD-EPI 2021: 86 ML/MIN/1.73M2
FASTING STATUS PATIENT QL REPORTED: YES
GLUCOSE SERPL-MCNC: 112 MG/DL (ref 70–99)
HBA1C MFR BLD: 5.8 % (ref 0–5.6)
HDLC SERPL-MCNC: 67 MG/DL
HGB BLD-MCNC: 14.2 G/DL (ref 11.7–15.7)
LDLC SERPL CALC-MCNC: 75 MG/DL
MICROALBUMIN UR-MCNC: 16.6 MG/L
MICROALBUMIN/CREAT UR: 16.44 MG/G CR (ref 0–25)
NONHDLC SERPL-MCNC: 93 MG/DL
POTASSIUM SERPL-SCNC: 4.2 MMOL/L (ref 3.4–5.3)
PROT SERPL-MCNC: 7 G/DL (ref 6.4–8.3)
SODIUM SERPL-SCNC: 142 MMOL/L (ref 135–145)
TRIGL SERPL-MCNC: 89 MG/DL

## 2024-01-30 PROCEDURE — 80061 LIPID PANEL: CPT | Performed by: STUDENT IN AN ORGANIZED HEALTH CARE EDUCATION/TRAINING PROGRAM

## 2024-01-30 PROCEDURE — 99396 PREV VISIT EST AGE 40-64: CPT | Performed by: STUDENT IN AN ORGANIZED HEALTH CARE EDUCATION/TRAINING PROGRAM

## 2024-01-30 PROCEDURE — 36415 COLL VENOUS BLD VENIPUNCTURE: CPT | Performed by: STUDENT IN AN ORGANIZED HEALTH CARE EDUCATION/TRAINING PROGRAM

## 2024-01-30 PROCEDURE — 85018 HEMOGLOBIN: CPT | Performed by: STUDENT IN AN ORGANIZED HEALTH CARE EDUCATION/TRAINING PROGRAM

## 2024-01-30 PROCEDURE — 80053 COMPREHEN METABOLIC PANEL: CPT | Performed by: STUDENT IN AN ORGANIZED HEALTH CARE EDUCATION/TRAINING PROGRAM

## 2024-01-30 PROCEDURE — 83036 HEMOGLOBIN GLYCOSYLATED A1C: CPT | Performed by: STUDENT IN AN ORGANIZED HEALTH CARE EDUCATION/TRAINING PROGRAM

## 2024-01-30 PROCEDURE — 99214 OFFICE O/P EST MOD 30 MIN: CPT | Mod: 25 | Performed by: STUDENT IN AN ORGANIZED HEALTH CARE EDUCATION/TRAINING PROGRAM

## 2024-01-30 PROCEDURE — 82570 ASSAY OF URINE CREATININE: CPT | Performed by: STUDENT IN AN ORGANIZED HEALTH CARE EDUCATION/TRAINING PROGRAM

## 2024-01-30 PROCEDURE — 82043 UR ALBUMIN QUANTITATIVE: CPT | Performed by: STUDENT IN AN ORGANIZED HEALTH CARE EDUCATION/TRAINING PROGRAM

## 2024-01-30 ASSESSMENT — ENCOUNTER SYMPTOMS
WEAKNESS: 0
HEMATURIA: 0
SHORTNESS OF BREATH: 0
DIARRHEA: 0
DYSURIA: 0
HEMATOCHEZIA: 0
PARESTHESIAS: 0
FEVER: 0
MYALGIAS: 0
FREQUENCY: 0
ABDOMINAL PAIN: 0
NERVOUS/ANXIOUS: 1
EYE PAIN: 0
CONSTIPATION: 0
COUGH: 0
JOINT SWELLING: 0
DIZZINESS: 0
NAUSEA: 0
HEARTBURN: 0
PALPITATIONS: 0
SORE THROAT: 0
HEADACHES: 0
CHILLS: 0
ARTHRALGIAS: 0

## 2024-01-30 NOTE — LETTER
[facility] TERESSA  01/30/24  Patient: Charlene Morales  YOB: 1965  Medical Record Number: [id number]                                                                                  Non-Opioid Controlled Substance Agreement    This is an agreement between you and your provider regarding safe and appropriate use of controlled substances prescribed by your care team. Controlled substances are?medicines that can cause physical and mental dependence (abuse).     There are strict laws about having and using these medicines. We here at [facility] are  committed to working with you in your efforts to get better. To support you in this work, we'll help you schedule regular office appointments for medicine refills. If we must cancel or change your appointment for any reason, we'll make sure you have enough medicine to last until your next appointment.     As a Provider, I will:   Listen carefully to your concerns while treating you with respect.   Recommend a treatment plan that I believe is in your best interest and may involve therapies other than medicine.    Talk with you often about the possible benefits and the risk of harm of any medicine that we prescribe for you.  Assess the safety of this medicine and check how well it works.    Provide a plan on how to taper (discontinue or go off) using this medicine if the decision is made to stop its use.      ::  As a Patient, I understand controlled substances:     Are prescribed by my care provider to help me function or work and manage my condition(s).?  Are strong medicines and can cause serious side effects.     Need to be taken exactly as prescribed.?Combining controlled substances with certain medicines or chemicals (such as illegal drugs, alcohol, sedatives, sleeping pills, and benzodiazepines) can be dangerous or even fatal.? If I stop taking my medicines suddenly, I may have severe withdrawal symptoms.     The risks, benefits, and  side effects of these medicine(s) were explained to me. I agree that:    I will take part in other treatments as advised by my care team. This may be psychiatry or counseling, physical therapy, behavioral therapy, group treatment or a referral to specialist.    I will keep all my appointments and understand this is part of the monitoring of controlled substances.?My care team may require an office visit for EVERY controlled substance refill. If I miss appointments or don t follow instructions, my care team may stop my medicine    I will take my medicines as prescribed. I will not change the dose or schedule unless my care team tells me to. There will be no refills if I run out early.      I may be asked to come to the clinic and complete a urine drug test or complete a pill count. If I don t give a urine sample or participate in a pill count, the care team may stop my medicine.    I will only receive controlled substance prescriptions from this clinic. If I am treated by another provider, I will tell them that I am taking controlled substances and that I have a treatment agreement with this provider. I will inform my [facility] care team within one business day if I am given a prescription for any controlled substance by another healthcare provider. My [facility] care team can contact other providers and pharmacists about my use of any medicines.    It is up to me to make sure that I don't run out of my medicines on weekends or holidays.?If my care team is willing to refill my prescription without a visit, I must request refills only during office hours. Refills may take up to 3 business days to process. I will use one pharmacy to fill all my controlled substance prescriptions. I will notify the clinic about any changes to my insurance or medicine availability.    I am responsible for my prescriptions. If the medicine/prescription is lost, stolen or destroyed, it will not be replaced.?I also agree not  to share controlled substance medicines with anyone.     I am aware I should not use any illegal or recreational drugs. I agree not to drink alcohol unless my care team says I can.     If I enroll in the [place] Medical Cannabis program, I will tell my care team before my next refill.    I will tell my care team right away if I become pregnant, have a new medical problem treated outside of my regular clinic, or have a change in my medicines.     I understand that this medicine can affect my thinking, judgment and reaction time.? Alcohol and drugs affect the brain and body, which can affect the safety of my driving. Being under the influence of alcohol or drugs can affect my decision-making, behaviors, personal safety and the safety of others. Driving while impaired (DWI) can occur if a person is driving, operating or in physical control of a car, motorcycle, boat, snowmobile, ATV, motorbike, off-road vehicle or any other motor vehicle ([place] Statute 169A.20). I understand the risk if I choose to drive or operate any vehicle or machinery.    I understand that if I do not follow any of the conditions above, my prescriptions or treatment may be stopped or changed.   I agree that my provider, clinic care team and pharmacy may work with any city, state or federal law enforcement agency that investigates the misuse, sale or other diversion of my controlled medicine. I will allow my provider to discuss my care with, or share a copy of, this agreement with any other treating provider, pharmacy or emergency room where I receive care.     I have read this agreement and have asked questions about anything I did not understand.    ________________________________________________________  Patient Signature - Charlene Barbaws     ___________________                   Date     ________________________________________________________  Provider Signature - Luisa Hall MD       ___________________                   Date      ________________________________________________________  Witness Signature (required if provider not present while patient signing)          ___________________                   Date

## 2024-01-30 NOTE — PROGRESS NOTES
Preventive Care Visit  [facility] TERESSA Hall MD, Family Medicine  Jan 30, 2024       SUBJECTIVE:   Charlene is a 58 year old, presenting for the following:  Physical        1/30/2024     7:04 AM   Additional Questions   Roomed by Evelyn LEES         1/30/2024     7:04 AM   Patient Reported Additional Medications   Patient reports taking the following new medications None     Healthy Habits:     Getting at least 3 servings of Calcium per day:  Yes    Bi-annual eye exam:  Yes    Dental care twice a year:  NO    Sleep apnea or symptoms of sleep apnea:  None    Diet:  Regular (no restrictions)    Frequency of exercise:  2-3 days/week    Duration of exercise:  15-30 minutes    Taking medications regularly:  Yes    Medication side effects:  Not applicable    Additional concerns today:  Yes (Arthritis in hands)  The patient has a documented history of major depression and is presently prescribed an SSRI. Additionally, she is being treated for ADHD with Adderall. Under the care of psychiatry, she expresses interest in transferring her treatment to this facility.  She is currently getting a masters degree.    Furthermore, she manages hyperlipidemia with a statin, hypertension with lisinopril, GERD with omeprazole, and insomnia with trazodone. She reports no adverse effects from her medications and adheres to her regimen diligently.    She also has CMC arthritis and is scheduled to undergo surgery on her right hand, for which she consults with Ortho tomorrow. Notably, she has intentionally shed 35 pounds since her last visit.  She has history of rectocele.  She was meant to have surgery however she canceled it.  She would like to see specialist again  Today's PHQ-9 Score:       1/30/2024     6:51 AM   PHQ-9 SCORE   PHQ-9 Total Score MyChart 0   PHQ-9 Total Score 0                 Social History     Tobacco Use    Smoking status: Never    Smokeless tobacco: Never   Substance Use Topics     Alcohol use: Yes     Comment: 3-4 drinks a week at most- wine              1/30/2024     6:52 AM   Alcohol Use   Prescreen: >3 drinks/day or >7 drinks/week? No     Reviewed orders with patient.  Reviewed health maintenance and updated orders accordingly - Yes  Lab work is in process  Labs reviewed in EPIC    Breast Cancer Screening:    FHS-7:       5/3/2022     3:41 PM   Breast CA Risk Assessment (FHS-7)   Did any of your first-degree relatives have breast or ovarian cancer? No   Did any of your relatives have bilateral breast cancer? No   Did any man in your family have breast cancer? No   Did any woman in your family have breast and ovarian cancer? No   Did any woman in your family have breast cancer before age 50 y? No   Do you have 2 or more relatives with breast and/or ovarian cancer? No   Do you have 2 or more relatives with breast and/or bowel cancer? No       Mammogram Screening: Recommended mammography every 1-2 years with patient discussion and risk factor consideration  Pertinent mammograms are reviewed under the imaging tab.    History of abnormal Pap smear:      8/16/2005    12:00 AM   PAP / HPV   PAP (Historical) NIL      Reviewed and updated as needed this visit by clinical staff    Allergies  Meds              Reviewed and updated as needed this visit by Provider                  Past Medical History:   Diagnosis Date    Anemia, unspecified     CONVULSIONS, OTHER 1/8/2006    History of seizure with withdrawal of Butalbital    Depressive disorder, not elsewhere classified     Headache(784.0)     Other and unspecified alcohol dependence, unspecified drinking behavior     Other convulsions     Peptic ulcer, unspecified site, unspecified as acute or chronic, without mention of hemorrhage, perforation, or obstruction     Premenstrual tension syndromes     RENAL CYST 6/30/2005 June 30, 2005 - 3.7cm simple cyst.  Recheck 6 months. January 10, 2006 - Will recheck. 5/06 - stable.    Unspecified essential  "hypertension     Unspecified gastritis and gastroduodenitis without mention of hemorrhage     Unspecified hemorrhoids without mention of complication       Past Surgical History:   Procedure Laterality Date    HYSTERECTOMY, PAP NO LONGER INDICATED  2004    Still has ovaries and     SURGICAL HISTORY OF -   8/23/2004    Hemorrhoidectomy & Partial Lateral Sphincterotomy    SURGICAL HISTORY OF -   5/2/2003    Hemorrhoid Banding x2    ZZHC COLONOSCOPY THRU STOMA, DIAGNOSTIC  5/2/03    Normal. Repeat at age 52     OB History   No obstetric history on file.     Review of Systems   Constitutional:  Negative for chills and fever.   HENT:  Negative for congestion, ear pain, hearing loss and sore throat.    Eyes:  Negative for pain and visual disturbance.   Respiratory:  Negative for cough and shortness of breath.    Cardiovascular:  Negative for chest pain and palpitations.   Gastrointestinal:  Negative for abdominal pain, constipation, diarrhea and nausea.   Genitourinary:  Negative for dysuria, frequency, genital sores, hematuria and urgency.   Musculoskeletal:  Negative for arthralgias, joint swelling and myalgias.   Skin:  Negative for rash.   Neurological:  Negative for dizziness, weakness and headaches.   Psychiatric/Behavioral:  The patient is nervous/anxious.          OBJECTIVE:   /86 (BP Location: Left arm, Patient Position: Sitting, Cuff Size: Adult Regular)   Pulse 82   Temp 98.3  F (36.8  C) (Oral)   Resp 16   Ht 1.651 m (5' 5\")   Wt 68.9 kg (152 lb)   SpO2 100%   BMI 25.29 kg/m     Estimated body mass index is 25.29 kg/m  as calculated from the following:    Height as of this encounter: 1.651 m (5' 5\").    Weight as of this encounter: 68.9 kg (152 lb).  Physical Exam  GENERAL: alert and no distress  EYES: Eyes grossly normal to inspection, PERRL and conjunctivae and sclerae normal  HENT: ear canals and TM's normal, nose and mouth without ulcers or lesions  NECK: no adenopathy, no asymmetry, masses, " or scars  RESP: lungs clear to auscultation - no rales, rhonchi or wheezes  CV: regular rate and rhythm, normal S1 S2, no S3 or S4, no murmur, click or rub, no peripheral edema  ABDOMEN: soft, nontender, no hepatosplenomegaly, no masses and bowel sounds normal  MS: no gross musculoskeletal defects noted, no edema  SKIN: no suspicious lesions or rashes  NEURO: Normal strength and tone, mentation intact and speech normal  PSYCH: mentation appears normal, affect normal/bright        ASSESSMENT/PLAN:   Routine general medical examination at a health care facility      Major depressive disorder, single episode, moderate (H)  Stable, currently in therapy.  Continue Paxil  - PRIMARY CARE FOLLOW-UP SCHEDULING    Benign hypertension with CKD (chronic kidney disease) stage III (H)  Controlled, continue lisinopril  - Hemoglobin; Future  - Albumin Random Urine Quantitative with Creat Ratio; Future  - Comprehensive metabolic panel (BMP + Alb, Alk Phos, ALT, AST, Total. Bili, TP); Future  - Comprehensive metabolic panel (BMP + Alb, Alk Phos, ALT, AST, Total. Bili, TP)  - Albumin Random Urine Quantitative with Creat Ratio  - Hemoglobin    Screen for colon cancer    - Colonoscopy Screening  Referral; Future    Hyperlipidemia LDL goal <130  Stable, continue lifestyle modification and statin  - Lipid panel reflex to direct LDL Non-fasting; Future  - Lipid panel reflex to direct LDL Non-fasting    Rectocele    - Adult Uro/Gyn  Referral; Future    Attention deficit hyperactivity disorder (ADHD), predominantly inattentive type  Stable, continue Adderall  CSA signed PDMP checked  Chronic kidney disease, stage 3a (H)  Stable, avoid nephrotoxins    Primary insomnia  Stable, continue trazodone    Screening for diabetes mellitus    - Hemoglobin A1c; Future  - Hemoglobin A1c    Encounter for screening mammogram for breast cancer    - *MA Screening Digital Bilateral; Future    Non-smoker      Patient has been advised of split  "billing requirements and indicates understanding: Yes      Counseling  Reviewed preventive health counseling, as reflected in patient instructions      BMI  Estimated body mass index is 25.29 kg/m  as calculated from the following:    Height as of this encounter: 1.651 m (5' 5\").    Weight as of this encounter: 68.9 kg (152 lb).   Weight management plan: Discussed healthy diet and exercise guidelines      She reports that she has never smoked. She has never used smokeless tobacco.          Signed Electronically by: Luisa Hall MD  Answers submitted by the patient for this visit:  Patient Health Questionnaire (Submitted on 1/30/2024)  If you checked off any problems, how difficult have these problems made it for you to do your work, take care of things at home, or get along with other people?: Not difficult at all  PHQ9 TOTAL SCORE: 0  Annual Preventive Visit (Submitted on 1/30/2024)  Chief Complaint: Annual Exam:  Blood in stool: No  heartburn: No  peripheral edema: No  mood changes: No  Skin sensation changes: No    "

## 2024-01-31 DIAGNOSIS — F41.8 SITUATIONAL ANXIETY: ICD-10-CM

## 2024-01-31 RX ORDER — LORAZEPAM 0.5 MG/1
TABLET ORAL
Qty: 10 TABLET | Refills: 0 | Status: SHIPPED | OUTPATIENT
Start: 2024-01-31 | End: 2024-06-11

## 2024-02-05 DIAGNOSIS — F33.1 MAJOR DEPRESSIVE DISORDER, RECURRENT EPISODE, MODERATE (H): ICD-10-CM

## 2024-02-05 DIAGNOSIS — F51.01 PRIMARY INSOMNIA: ICD-10-CM

## 2024-02-05 RX ORDER — TRAZODONE HYDROCHLORIDE 100 MG/1
200 TABLET ORAL AT BEDTIME
Qty: 180 TABLET | Refills: 0 | Status: SHIPPED | OUTPATIENT
Start: 2024-02-05 | End: 2024-07-23

## 2024-02-05 RX ORDER — TRAZODONE HYDROCHLORIDE 100 MG/1
TABLET ORAL
Qty: 180 TABLET | Refills: 0 | OUTPATIENT
Start: 2024-02-05

## 2024-02-05 NOTE — TELEPHONE ENCOUNTER
Patient states she misplaced her medication. She has no idea where she put them. She does not believe they were stolen. She did not  her Ativan.     She is requesting refill for only trazodone (requesting asap refill).     Salome Pabon RN on 2/5/2024 at 11:20 AM

## 2024-02-13 ENCOUNTER — TRANSFERRED RECORDS (OUTPATIENT)
Dept: HEALTH INFORMATION MANAGEMENT | Facility: CLINIC | Age: 59
End: 2024-02-13
Payer: COMMERCIAL

## 2024-02-16 DIAGNOSIS — F90.0 ATTENTION DEFICIT HYPERACTIVITY DISORDER (ADHD), PREDOMINANTLY INATTENTIVE TYPE: Primary | ICD-10-CM

## 2024-02-16 RX ORDER — DEXTROAMPHETAMINE SULFATE, DEXTROAMPHETAMINE SACCHARATE, AMPHETAMINE SULFATE AND AMPHETAMINE ASPARTATE 7.5; 7.5; 7.5; 7.5 MG/1; MG/1; MG/1; MG/1
30 CAPSULE, EXTENDED RELEASE ORAL DAILY
Qty: 30 CAPSULE | Refills: 0 | Status: SHIPPED | OUTPATIENT
Start: 2024-02-16 | End: 2024-03-22

## 2024-03-21 DIAGNOSIS — F90.0 ATTENTION DEFICIT HYPERACTIVITY DISORDER (ADHD), PREDOMINANTLY INATTENTIVE TYPE: ICD-10-CM

## 2024-03-22 RX ORDER — DEXTROAMPHETAMINE SACCHARATE, AMPHETAMINE ASPARTATE MONOHYDRATE, DEXTROAMPHETAMINE SULFATE AND AMPHETAMINE SULFATE 7.5; 7.5; 7.5; 7.5 MG/1; MG/1; MG/1; MG/1
30 CAPSULE, EXTENDED RELEASE ORAL DAILY
Qty: 30 CAPSULE | Refills: 0 | Status: SHIPPED | OUTPATIENT
Start: 2024-03-22 | End: 2024-06-26

## 2024-03-25 ENCOUNTER — VIRTUAL VISIT (OUTPATIENT)
Dept: FAMILY MEDICINE | Facility: CLINIC | Age: 59
End: 2024-03-25
Payer: COMMERCIAL

## 2024-03-25 DIAGNOSIS — F41.1 GENERALIZED ANXIETY DISORDER: ICD-10-CM

## 2024-03-25 DIAGNOSIS — F32.1 MAJOR DEPRESSIVE DISORDER, SINGLE EPISODE, MODERATE (H): ICD-10-CM

## 2024-03-25 DIAGNOSIS — F90.0 ATTENTION DEFICIT HYPERACTIVITY DISORDER (ADHD), PREDOMINANTLY INATTENTIVE TYPE: Primary | ICD-10-CM

## 2024-03-25 PROCEDURE — 99214 OFFICE O/P EST MOD 30 MIN: CPT | Mod: 95 | Performed by: STUDENT IN AN ORGANIZED HEALTH CARE EDUCATION/TRAINING PROGRAM

## 2024-03-25 RX ORDER — ATOMOXETINE 40 MG/1
40 CAPSULE ORAL DAILY
Qty: 60 CAPSULE | Refills: 0 | Status: SHIPPED | OUTPATIENT
Start: 2024-04-01 | End: 2024-04-22

## 2024-03-25 ASSESSMENT — ANXIETY QUESTIONNAIRES
6. BECOMING EASILY ANNOYED OR IRRITABLE: SEVERAL DAYS
7. FEELING AFRAID AS IF SOMETHING AWFUL MIGHT HAPPEN: NOT AT ALL
7. FEELING AFRAID AS IF SOMETHING AWFUL MIGHT HAPPEN: NOT AT ALL
1. FEELING NERVOUS, ANXIOUS, OR ON EDGE: MORE THAN HALF THE DAYS
5. BEING SO RESTLESS THAT IT IS HARD TO SIT STILL: SEVERAL DAYS
GAD7 TOTAL SCORE: 10
8. IF YOU CHECKED OFF ANY PROBLEMS, HOW DIFFICULT HAVE THESE MADE IT FOR YOU TO DO YOUR WORK, TAKE CARE OF THINGS AT HOME, OR GET ALONG WITH OTHER PEOPLE?: SOMEWHAT DIFFICULT
3. WORRYING TOO MUCH ABOUT DIFFERENT THINGS: MORE THAN HALF THE DAYS
IF YOU CHECKED OFF ANY PROBLEMS ON THIS QUESTIONNAIRE, HOW DIFFICULT HAVE THESE PROBLEMS MADE IT FOR YOU TO DO YOUR WORK, TAKE CARE OF THINGS AT HOME, OR GET ALONG WITH OTHER PEOPLE: SOMEWHAT DIFFICULT
GAD7 TOTAL SCORE: 10
4. TROUBLE RELAXING: MORE THAN HALF THE DAYS
2. NOT BEING ABLE TO STOP OR CONTROL WORRYING: MORE THAN HALF THE DAYS

## 2024-03-25 NOTE — PROGRESS NOTES
Medication Followup of Adderall  Taking Medication as prescribed: yes  Side Effects:  Anxiety and no appetite  Medication Helping Symptoms:  feels like symptoms      Never Rarely Sometimes Often Very Often   1 How often do you have trouble wrapping up the final details of a project once the challenging parts have been done?     x   2 How often do you have difficulty getting things in order when you have to do a task that requires organization?   x     3 How often do you have problems remembering appointments or obligations?    x    4 When you have a task that requires a lot of thought, how often do you avoid or delay getting started?     x   5 How often do you fidget or squirm with your hands or feet when you have to sit down for a long time?     x   6 How often do you feel overly active and compelled to do things, like you were driven by a motor?   x     7 How often do you make careless mistakes when you have to work on a boring or difficult project?   x     8 How often do you have difficulty keeping your attention when you are doing boring or repetitive work?     x   9 How often do you have difficulty concentrating on what people say to you, even when they are speaking to you directly?   x     10 How often do you misplace or have difficulty finding things at home or at work?    x    11 How often are you distracted by activity or noise around you?     x   12 How often do you leave your seat in meetings or other situations in which you are expected to remain seated?   x     13 How often do you feel restless or fidgety?   x     14 How often do you have difficulty unwinding and relaxing when you have time to yourself?   x     15 How often do you find yourself talking too much when you are in social situations?     x   16 When you're in a conversation, how often do you find yourself finishing the sentences of the people you are talking to, before they can finish them themselves?     x   17 How often do you have  difficulty waiting your turn in situations when turn-taking is required?     x   18 How often do you interrupt others when they are busy?     x

## 2024-03-25 NOTE — PROGRESS NOTES
Charlene is a 59 year old who is being evaluated via a billable video visit.    How would you like to obtain your AVS? MyChart  If the video visit is dropped, the invitation should be resent by: Text to cell phone: [phone]  Will anyone else be joining your video visit? No      Assessment & Plan     1.Attention deficit hyperactivity disorder (ADHD), predominantly inattentive type  Decrease Adderall from 30 mg to 15 mg for 1 week then stop and start Atomoxetine.  She has 15 mg tabs at home- atomoxetine (STRATTERA) 40 MG capsule; Take 1 capsule (40 mg) by mouth daily Okay to increase to 80 mg after 3 days on 40 mg.  The risks, benefits and treatment options of prescribed medications or other treatments have been discussed with the patient. The patient verbalized their understanding and should call or follow up if no improvement or if they develop further problems    2.Major depressive disorder, single episode, moderate (H)  Uncontrolled, continue Paxil.  Start CBT    3.Generalized anxiety disorder  Uncontrolled.  Continue Paxil and gabapentin.  Start CBT  Follow-up in 2 weeks    #1 is exacerbating #2 and 3  Subjective   Charlene is a 59 year old, presenting for the following health issues:  ADHD and Mental Health Fup      3/25/2024     3:15 PM   Additional Questions   Roomed by Evelyn LEES         3/25/2024     3:15 PM   Patient Reported Additional Medications   Patient reports taking the following new medications No new medications to add     History of Present Illness       Mental Health Follow-up:  Patient presents to follow-up on Depression & Anxiety.Patient's depression since last visit has been:  Worse  The patient is having other symptoms associated with depression.  Patient's anxiety since last visit has been:  Worse  The patient is having other symptoms associated with anxiety.  Any significant life events: relationship concerns, financial concerns and grief or loss  Patient is feeling anxious or having panic  attacks.  Patient has no concerns about alcohol or drug use.    Reason for visit:  Change ADHD meds    She eats 2-3 servings of fruits and vegetables daily.She consumes 0 sweetened beverage(s) daily.She exercises with enough effort to increase her heart rate 10 to 19 minutes per day.  She exercises with enough effort to increase her heart rate 5 days per week.   She is taking medications regularly.   She is starting therapy very soon  Medication Followup of Adderall  Taking Medication as prescribed: yes  Side Effects:  Anxiety and no appetite  Medication Helping Symptoms:  feels like symptoms  She perceives that her Adderall exacerbates her anxiety.  Patient in turn exacerbating her depression .she would prefer to discontinue it and explore an alternative treatment.      Never Rarely Sometimes Often Very Often   1 How often do you have trouble wrapping up the final details of a project once the challenging parts have been done?         x   2 How often do you have difficulty getting things in order when you have to do a task that requires organization?     x       3 How often do you have problems remembering appointments or obligations?       x     4 When you have a task that requires a lot of thought, how often do you avoid or delay getting started?         x   5 How often do you fidget or squirm with your hands or feet when you have to sit down for a long time?         x   6 How often do you feel overly active and compelled to do things, like you were driven by a motor?     x       7 How often do you make careless mistakes when you have to work on a boring or difficult project?     x       8 How often do you have difficulty keeping your attention when you are doing boring or repetitive work?         x   9 How often do you have difficulty concentrating on what people say to you, even when they are speaking to you directly?     x       10 How often do you misplace or have difficulty finding things at home or at work?        x     11 How often are you distracted by activity or noise around you?         x   12 How often do you leave your seat in meetings or other situations in which you are expected to remain seated?     x       13 How often do you feel restless or fidgety?     x       14 How often do you have difficulty unwinding and relaxing when you have time to yourself?     x       15 How often do you find yourself talking too much when you are in social situations?         x   16 When you're in a conversation, how often do you find yourself finishing the sentences of the people you are talking to, before they can finish them themselves?         x   17 How often do you have difficulty waiting your turn in situations when turn-taking is required?         x   18 How often do you interrupt others when they are busy?         x             Social History     Tobacco Use    Smoking status: Never    Smokeless tobacco: Never   Vaping Use    Vaping Use: Never used   Substance Use Topics    Alcohol use: Yes     Comment: 3-4 drinks a week at most- wine     Drug use: No         12/27/2022    12:02 PM 7/5/2023    10:50 AM 1/30/2024     6:51 AM   PHQ   PHQ-9 Total Score 0 0 0   Q9: Thoughts of better off dead/self-harm past 2 weeks Not at all Not at all Not at all         2/26/2020     4:46 PM 5/3/2021     3:04 PM 3/25/2024     2:01 PM   JOANA-7 SCORE   Total Score   10 (moderate anxiety)   Total Score 4 3 10         1/30/2024     6:51 AM   Last PHQ-9   1.  Little interest or pleasure in doing things 0   2.  Feeling down, depressed, or hopeless 0   3.  Trouble falling or staying asleep, or sleeping too much 0   4.  Feeling tired or having little energy 0   5.  Poor appetite or overeating 0   6.  Feeling bad about yourself 0   7.  Trouble concentrating 0   8.  Moving slowly or restless 0   Q9: Thoughts of better off dead/self-harm past 2 weeks 0   PHQ-9 Total Score 0         3/25/2024     2:01 PM   JOANA-7    1. Feeling nervous, anxious, or on  edge 2   2. Not being able to stop or control worrying 2   3. Worrying too much about different things 2   4. Trouble relaxing 2   5. Being so restless that it is hard to sit still 1   6. Becoming easily annoyed or irritable 1   7. Feeling afraid, as if something awful might happen 0   JOANA-7 Total Score 10   If you checked any problems, how difficult have they made it for you to do your work, take care of things at home, or get along with other people? Somewhat difficult       6}          Review of Systems  Constitutional, neuro, ENT, endocrine, pulmonary, cardiac, gastrointestinal, genitourinary, musculoskeletal, integument and psychiatric systems are negative, except as otherwise noted.      Objective           Vitals:  No vitals were obtained today due to virtual visit.    Physical Exam   GENERAL: alert and no distress  EYES: Eyes grossly normal to inspection.  No discharge or erythema, or obvious scleral/conjunctival abnormalities.  RESP: No audible wheeze, cough, or visible cyanosis.    SKIN: Visible skin clear. No significant rash, abnormal pigmentation or lesions.    PSYCH: Appropriate affect, tone, and pace of words      Video-Visit Details    Type of service:  Video Visit   Originating Location (pt. Location): Home    Distant Location (provider location):  On-site  Platform used for Video Visit: Maira  Signed Electronically by: Luisa Hall MD

## 2024-03-25 NOTE — PATIENT INSTRUCTIONS
Aly Chang,    Thank you for allowing [facility] to manage your care.        I sent your prescriptions to your pharmacy.        For your convenience, test results are released as soon as they are available  Please allow 1-2 business days for me to send you a comment about your results.  If not done so, I encourage you to login into [employer] ([email or web address] to review your results in real time.     If you have any questions or concerns, please feel free to call us at [phone].    Sincerely,    Dr. Hall    Did you know?      You can schedule a video visit for follow-up appointments as well as future appointments for certain conditions.  Please see the below link.     [email or web address]    If you have not already done so,  I encourage you to sign up for [employer] ([email or web address]).  This will allow you to review your results, securely communicate with a provider, and schedule virtual visits as well.

## 2024-04-09 DIAGNOSIS — F32.1 MAJOR DEPRESSIVE DISORDER, SINGLE EPISODE, MODERATE (H): ICD-10-CM

## 2024-04-09 RX ORDER — PAROXETINE 40 MG/1
TABLET, FILM COATED ORAL
Qty: 90 TABLET | Refills: 0 | Status: SHIPPED | OUTPATIENT
Start: 2024-04-09 | End: 2024-06-11

## 2024-04-12 ENCOUNTER — VIRTUAL VISIT (OUTPATIENT)
Dept: FAMILY MEDICINE | Facility: CLINIC | Age: 59
End: 2024-04-12
Attending: STUDENT IN AN ORGANIZED HEALTH CARE EDUCATION/TRAINING PROGRAM
Payer: COMMERCIAL

## 2024-04-12 DIAGNOSIS — F90.0 ATTENTION DEFICIT HYPERACTIVITY DISORDER (ADHD), PREDOMINANTLY INATTENTIVE TYPE: ICD-10-CM

## 2024-04-12 PROCEDURE — 99213 OFFICE O/P EST LOW 20 MIN: CPT | Mod: 95 | Performed by: STUDENT IN AN ORGANIZED HEALTH CARE EDUCATION/TRAINING PROGRAM

## 2024-04-12 NOTE — LETTER
April 12, 2024      [facility] Morales  [address] [address]        To Whom It May Concern:    Charlene Morales  was seen in our clinic.  Please excuse her 4/8/24 until 4/12/24.        Sincerely,        Luisa Hall MD

## 2024-04-12 NOTE — PATIENT INSTRUCTIONS
Aly Chang,    Thank you for allowing [facility] to manage your care.            For your convenience, test results are released as soon as they are available  Please allow 1-2 business days for me to send you a comment about your results.  If not done so, I encourage you to login into [employer] ([email or web address] to review your results in real time.     If you have any questions or concerns, please feel free to call us at [phone].    Sincerely,    Dr. Hall    Did you know?      You can schedule a video visit for follow-up appointments as well as future appointments for certain conditions.  Please see the below link.     [email or web address]    If you have not already done so,  I encourage you to sign up for [employer] ([email or web address]).  This will allow you to review your results, securely communicate with a provider, and schedule virtual visits as well.

## 2024-04-12 NOTE — PROGRESS NOTES
Charlene is a 59 year old who is being evaluated via a billable video visit.    How would you like to obtain your AVS? MyChart  If the video visit is dropped, the invitation should be resent by: Text to cell phone: [phone]  Will anyone else be joining your video visit? No      Assessment & Plan     Attention deficit hyperactivity disorder (ADHD), predominantly inattentive type  She is doing weill on Strattera 80 mg. Work note given  - PRIMARY CARE FOLLOW-UP SCHEDULING            Subjective   Charlene is a 59 year old, presenting for the following health issues:      ADHD      4/12/2024     8:01 AM   Additional Questions   Roomed by Evelyn LEES         4/12/2024     8:01 AM   Patient Reported Additional Medications   Patient reports taking the following new medications No new medications to add     Patient requesting note for work had been off this week M-F    HPI     Medication Followup of Strattera  Taking Medication as prescribed: yes  Side Effects:  None  Medication Helping Symptoms:  yes      Never Rarely Sometimes Often Very Often   1 How often do you have trouble wrapping up the final details of a project once the challenging parts have been done?   x     2 How often do you have difficulty getting things in order when you have to do a task that requires organization? x       3 How often do you have problems remembering appointments or obligations?   x     4 When you have a task that requires a lot of thought, how often do you avoid or delay getting started?     x   5 How often do you fidget or squirm with your hands or feet when you have to sit down for a long time?    x    6 How often do you feel overly active and compelled to do things, like you were driven by a motor?  x      7 How often do you make careless mistakes when you have to work on a boring or difficult project?  x      8 How often do you have difficulty keeping your attention when you are doing boring or repetitive work?    x    9 How often do you  have difficulty concentrating on what people say to you, even when they are speaking to you directly?   x     10 How often do you misplace or have difficulty finding things at home or at work?    x    11 How often are you distracted by activity or noise around you?    x    12 How often do you leave your seat in meetings or other situations in which you are expected to remain seated?   x     13 How often do you feel restless or fidgety?   x     14 How often do you have difficulty unwinding and relaxing when you have time to yourself?  x      15 How often do you find yourself talking too much when you are in social situations?    x    16 When you're in a conversation, how often do you find yourself finishing the sentences of the people you are talking to, before they can finish them themselves?    x    17 How often do you have difficulty waiting your turn in situations when turn-taking is required?    x    18 How often do you interrupt others when they are busy?    x      He was started on atomoxetine last appointment.  She is doing well on 80 mg at this time.  She states she feels her thought is clearer on the new medication        Review of Systems  Constitutional, HEENT, cardiovascular, pulmonary, gi and gu systems are negative, except as otherwise noted.      Objective           Vitals:  No vitals were obtained today due to virtual visit.    Physical Exam   GENERAL: alert and no distress  EYES: Eyes grossly normal to inspection.  No discharge or erythema, or obvious scleral/conjunctival abnormalities.  RESP: No audible wheeze, cough, or visible cyanosis.    SKIN: Visible skin clear. No significant rash, abnormal pigmentation or lesions.  PSYCH: Appropriate affect, tone, and pace of words      Video-Visit Details    Type of service:  Video Visit   Originating Location (pt. Location): Home    Distant Location (provider location):  On-site  Platform used for Video Visit: Maira  Signed Electronically by: Luisa  MD Isabel

## 2024-04-15 ENCOUNTER — TELEPHONE (OUTPATIENT)
Dept: FAMILY MEDICINE | Facility: CLINIC | Age: 59
End: 2024-04-15
Payer: COMMERCIAL

## 2024-04-15 NOTE — TELEPHONE ENCOUNTER
Pt is calling. She was expecting a letter (work excuse) from her PCP, but not able to find it in [email or web address]. Writer reviewed pt's chart and note that letter was written 04/12/24. Notified patient. She had [email or web address] pulled up and checked under menu tab and was able to find it.     Pennie Lei RN

## 2024-04-22 DIAGNOSIS — F90.0 ATTENTION DEFICIT HYPERACTIVITY DISORDER (ADHD), PREDOMINANTLY INATTENTIVE TYPE: ICD-10-CM

## 2024-04-22 DIAGNOSIS — I10 HTN, GOAL BELOW 130/80: ICD-10-CM

## 2024-04-22 RX ORDER — LISINOPRIL 40 MG/1
40 TABLET ORAL DAILY
Qty: 90 TABLET | Refills: 0 | Status: SHIPPED | OUTPATIENT
Start: 2024-04-22 | End: 2024-07-15

## 2024-04-22 RX ORDER — ATOMOXETINE 80 MG/1
80 CAPSULE ORAL DAILY
Qty: 30 CAPSULE | Refills: 1 | Status: SHIPPED | OUTPATIENT
Start: 2024-04-22 | End: 2024-06-26

## 2024-04-25 ENCOUNTER — TRANSFERRED RECORDS (OUTPATIENT)
Dept: HEALTH INFORMATION MANAGEMENT | Facility: CLINIC | Age: 59
End: 2024-04-25
Payer: COMMERCIAL

## 2024-06-02 ENCOUNTER — HEALTH MAINTENANCE LETTER (OUTPATIENT)
Age: 59
End: 2024-06-02

## 2024-06-11 ENCOUNTER — VIRTUAL VISIT (OUTPATIENT)
Dept: FAMILY MEDICINE | Facility: CLINIC | Age: 59
End: 2024-06-11
Payer: COMMERCIAL

## 2024-06-11 DIAGNOSIS — Z13.29 SCREENING FOR THYROID DISORDER: ICD-10-CM

## 2024-06-11 DIAGNOSIS — F41.1 GENERALIZED ANXIETY DISORDER: Primary | ICD-10-CM

## 2024-06-11 DIAGNOSIS — F33.1 MAJOR DEPRESSIVE DISORDER, RECURRENT EPISODE, MODERATE (H): ICD-10-CM

## 2024-06-11 PROCEDURE — 99214 OFFICE O/P EST MOD 30 MIN: CPT | Mod: 95 | Performed by: STUDENT IN AN ORGANIZED HEALTH CARE EDUCATION/TRAINING PROGRAM

## 2024-06-11 RX ORDER — LORAZEPAM 0.5 MG/1
TABLET ORAL
Qty: 10 TABLET | Refills: 0 | Status: SHIPPED | OUTPATIENT
Start: 2024-06-11

## 2024-06-11 RX ORDER — ESCITALOPRAM OXALATE 10 MG/1
TABLET ORAL
Qty: 45 TABLET | Refills: 0 | Status: SHIPPED | OUTPATIENT
Start: 2024-06-11 | End: 2024-06-26

## 2024-06-11 ASSESSMENT — ANXIETY QUESTIONNAIRES
7. FEELING AFRAID AS IF SOMETHING AWFUL MIGHT HAPPEN: NOT AT ALL
2. NOT BEING ABLE TO STOP OR CONTROL WORRYING: NEARLY EVERY DAY
7. FEELING AFRAID AS IF SOMETHING AWFUL MIGHT HAPPEN: NOT AT ALL
IF YOU CHECKED OFF ANY PROBLEMS ON THIS QUESTIONNAIRE, HOW DIFFICULT HAVE THESE PROBLEMS MADE IT FOR YOU TO DO YOUR WORK, TAKE CARE OF THINGS AT HOME, OR GET ALONG WITH OTHER PEOPLE: EXTREMELY DIFFICULT
6. BECOMING EASILY ANNOYED OR IRRITABLE: SEVERAL DAYS
8. IF YOU CHECKED OFF ANY PROBLEMS, HOW DIFFICULT HAVE THESE MADE IT FOR YOU TO DO YOUR WORK, TAKE CARE OF THINGS AT HOME, OR GET ALONG WITH OTHER PEOPLE?: EXTREMELY DIFFICULT
3. WORRYING TOO MUCH ABOUT DIFFERENT THINGS: NEARLY EVERY DAY
GAD7 TOTAL SCORE: 14
5. BEING SO RESTLESS THAT IT IS HARD TO SIT STILL: SEVERAL DAYS
1. FEELING NERVOUS, ANXIOUS, OR ON EDGE: NEARLY EVERY DAY
GAD7 TOTAL SCORE: 14
4. TROUBLE RELAXING: NEARLY EVERY DAY

## 2024-06-11 ASSESSMENT — PATIENT HEALTH QUESTIONNAIRE - PHQ9: SUM OF ALL RESPONSES TO PHQ QUESTIONS 1-9: 21

## 2024-06-11 NOTE — LETTER
My Depression Action Plan  Name: Charlene Morales   Date of Birth 1965  Date: 6/11/2024    My doctor: Luisa Hall   My clinic: [facility] [place]  [address]  [place] [address]  [phone]            GREEN    ZONE   Good Control    What it looks like:   Things are going generally well. You have normal ups and downs. You may even feel depressed from time to time, but bad moods usually last less than a day.   What you need to do:  Continue to care for yourself (see self care plan)  Check your depression survival kit and update it as needed  Follow your physician s recommendations including any medication.  Do not stop taking medication unless you consult with your physician first.             YELLOW         ZONE Getting Worse    What it looks like:   Depression is starting to interfere with your life.   It may be hard to get out of bed; you may be starting to isolate yourself from others.  Symptoms of depression are starting to last most all day and this has happened for several days.   You may have suicidal thoughts but they are not constant.   What you need to do:     Call your care team. Your response to treatment will improve if you keep your care team informed of your progress. Yellow periods are signs an adjustment may need to be made.     Continue your self-care.  Just get dressed and ready for the day.  Don't give yourself time to talk yourself out of it.    Talk to someone in your support network.    Open up your Depression Self-Care Plan/Wellness Kit.             RED    ZONE Medical Alert - Get Help    What it looks like:   Depression is seriously interfering with your life.   You may experience these or other symptoms: You can t get out of bed most days, can t work or engage in other necessary activities, you have trouble taking care of basic hygiene, or basic responsibilities, thoughts of suicide or death that will not go away, self-injurious  behavior.     What you need to do:  Call your care team and request a same-day appointment. If they are not available (weekends or after hours) call your local crisis line, emergency room or 911.          Depression Self-Care Plan / Wellness Kit    Many people find that medication and therapy are helpful treatments for managing depression. In addition, making small changes to your everyday life can help to boost your mood and improve your wellbeing. Below are some tips for you to consider. Be sure to talk with your medical provider and/or behavioral health consultant if your symptoms are worsening or not improving.     Sleep   Sleep hygiene  means all of the habits that support good, restful sleep. It includes maintaining a consistent bedtime and wake time, using your bedroom only for sleeping or sex, and keeping the bedroom dark and free of distractions like a computer, smartphone, or television.     Develop a Healthy Routine  Maintain good hygiene. Get out of bed in the morning, make your bed, brush your teeth, take a shower, and get dressed. Don t spend too much time viewing media that makes you feel stressed. Find time to relax each day.    Exercise  Get some form of exercise every day. This will help reduce pain and release endorphins, the  feel good  chemicals in your brain. It can be as simple as just going for a walk or doing some gardening, anything that will get you moving.      Diet  Strive to eat healthy foods, including fruits and vegetables. Drink plenty of water. Avoid excessive sugar, caffeine, alcohol, and other mood-altering substances.     Stay Connected with Others  Stay in touch with friends and family members.    Manage Your Mood  Try deep breathing, massage therapy, biofeedback, or meditation. Take part in fun activities when you can. Try to find something to smile about each day.     Psychotherapy  Be open to working with a therapist if your provider recommends it.     Medication  Be sure to  take your medication as prescribed. Most anti-depressants need to be taken every day. It usually takes several weeks for medications to work. Not all medicines work for all people. It is important to follow-up with your provider to make sure you have a treatment plan that is working for you. Do not stop your medication abruptly without first discussing it with your provider.    Crisis Resources   These hotlines are for both adults and children. They and are open 24 hours a day, 7 days a week unless noted otherwise.    National Suicide Prevention Lifeline   988 or 1-506-486-OGIF (3424)    Crisis Text Line    www.crisistextline.org  Text HOME to 878027 from anywhere in the [place], anytime, about any type of crisis. A live, trained crisis counselor will receive the text and respond quickly.    [employer] Lifeline for LGBTQ Youth  A national crisis intervention and suicide lifeline for LGBTQ youth under 25. Provides a safe place to talk without judgement. Call 1-389.915.3253; text START to 169520 or visit www.thetrevorproject.org to talk to a trained counselor.    For [facility] crisis numbers, visit the [facility] website at:  https://[place].gov/dhs/people-we-serve/adults/health-care/mental-health/resources/crisis-contacts.jsp

## 2024-06-11 NOTE — PROGRESS NOTES
Charlene is a 59 year old who is being evaluated via a billable video visit.    How would you like to obtain your AVS? MyChart  If the video visit is dropped, the invitation should be resent by: Text to cell phone: [phone]  Will anyone else be joining your video visit? No      Assessment & Plan     Generalized  anxiety  uncontrolled  - LORazepam (ATIVAN)0.5 MG tablet; TAKE ONE TABLET BY MOUTH ONCE DAILY AS NEEDED FOR ANXIETY (USE SPARINGLY)  - DEPRESSION ACTION PLAN (DAP)    Major depressive disorder, recurrent episode, moderate (H)  Uncontrolled , stop Paxil,start Lexapro 15 mg for 7 days and 20 mg daily afterward. Increase gabapentin from 600 bid to 600 mg in am and 900 mg at pm. Continue Trazadone. We will eventually slowly taper off Trazadone. Start therapy. Crisis line given to patient. Counseling done today.  - Vitamin B12; Future  - Vitamin D Deficiency; Future  - TSH with free T4 reflex; Future  - escitalopram (LEXAPRO) 10 MG tablet; Take 1.5 tabs daily    Screening for thyroid disorder    - TSH with free T4 reflex; Future      30- minutes spent by me on the date of the encounter doing chart review, patient visit, documentation, and discussion with family         Subjective   Charlene is a 59 year old, presenting for the following health issues:  Recheck Medication      6/11/2024     8:47 AM   Additional Questions   Roomed by Nadia   Accompanied by n/a     History of Present Illness       Mental Health Follow-up:  Patient presents to follow-up on Depression & Anxiety.Patient's depression since last visit has been:  Worse  The patient is not having other symptoms associated with depression.  Patient's anxiety since last visit has been:  Worse  The patient is not having other symptoms associated with anxiety.  Any significant life events: relationship concerns  Patient is feeling anxious or having panic attacks.  Patient has no concerns about alcohol or drug use.    She eats 2-3 servings of fruits and  vegetables daily.She consumes 0 sweetened beverage(s) daily.She exercises with enough effort to increase her heart rate 20 to 29 minutes per day.  She exercises with enough effort to increase her heart rate 4 days per week.   She is taking medications regularly.       Patient is here with her .    Review of Systems  Constitutional, HEENT, cardiovascular, pulmonary, gi and gu systems are negative, except as otherwise noted.      Objective           Vitals:  No vitals were obtained today due to virtual visit.    Physical Exam   GENERAL: alert and no distress  EYES: Eyes grossly normal to inspection.  No discharge or erythema, or obvious scleral/conjunctival abnormalities.  RESP: No audible wheeze, cough, or visible cyanosis.    PSYCH: Appropriate affect, tone, and pace of words      Video-Visit Details    Type of service:  Video Visit   Originating Location (pt. Location): Home    Distant Location (provider location):  On-site  Platform used for Video Visit: Maira  Signed Electronically by: Luisa Hall MD

## 2024-06-11 NOTE — PATIENT INSTRUCTIONS
Aly Chang,    Thank you for allowing [facility] to manage your care.    I ordered some blood work, please go to the laboratory to get your laboratory studies.        I sent your prescriptions to your pharmacy.        For your convenience, test results are released as soon as they are available  Please allow 1-2 business days for me to send you a comment about your results.  If not done so, I encourage you to login into [employer] ([email or web address] to review your results in real time.     If you have any questions or concerns, please feel free to call us at [phone].    Sincerely,    Dr. Hall    Did you know?      You can schedule a video visit for follow-up appointments as well as future appointments for certain conditions.  Please see the below link.     [email or web address]    If you have not already done so,  I encourage you to sign up for [employer] ([email or web address]).  This will allow you to review your results, securely communicate with a provider, and schedule virtual visits as well.

## 2024-06-14 ENCOUNTER — LAB (OUTPATIENT)
Dept: LAB | Facility: CLINIC | Age: 59
End: 2024-06-14
Payer: COMMERCIAL

## 2024-06-14 DIAGNOSIS — F33.1 MAJOR DEPRESSIVE DISORDER, RECURRENT EPISODE, MODERATE (H): ICD-10-CM

## 2024-06-14 DIAGNOSIS — Z13.29 SCREENING FOR THYROID DISORDER: ICD-10-CM

## 2024-06-14 LAB — TSH SERPL DL<=0.005 MIU/L-ACNC: 0.65 UIU/ML (ref 0.3–4.2)

## 2024-06-14 PROCEDURE — 36415 COLL VENOUS BLD VENIPUNCTURE: CPT

## 2024-06-14 PROCEDURE — 82607 VITAMIN B-12: CPT

## 2024-06-14 PROCEDURE — 84443 ASSAY THYROID STIM HORMONE: CPT

## 2024-06-14 PROCEDURE — 82306 VITAMIN D 25 HYDROXY: CPT

## 2024-06-15 LAB
VIT B12 SERPL-MCNC: 526 PG/ML (ref 232–1245)
VIT D+METAB SERPL-MCNC: 23 NG/ML (ref 20–50)

## 2024-06-18 DIAGNOSIS — K27.9 PEPTIC ULCER: ICD-10-CM

## 2024-06-19 DIAGNOSIS — K27.9 PEPTIC ULCER: ICD-10-CM

## 2024-06-26 ENCOUNTER — VIRTUAL VISIT (OUTPATIENT)
Dept: FAMILY MEDICINE | Facility: CLINIC | Age: 59
End: 2024-06-26
Payer: COMMERCIAL

## 2024-06-26 DIAGNOSIS — F51.01 PRIMARY INSOMNIA: ICD-10-CM

## 2024-06-26 DIAGNOSIS — F90.0 ATTENTION DEFICIT HYPERACTIVITY DISORDER (ADHD), PREDOMINANTLY INATTENTIVE TYPE: ICD-10-CM

## 2024-06-26 DIAGNOSIS — F33.1 MAJOR DEPRESSIVE DISORDER, RECURRENT EPISODE, MODERATE (H): ICD-10-CM

## 2024-06-26 DIAGNOSIS — F41.1 GENERALIZED ANXIETY DISORDER: Primary | ICD-10-CM

## 2024-06-26 DIAGNOSIS — R10.2 PELVIC PAIN IN FEMALE: ICD-10-CM

## 2024-06-26 PROCEDURE — 99214 OFFICE O/P EST MOD 30 MIN: CPT | Mod: 95 | Performed by: STUDENT IN AN ORGANIZED HEALTH CARE EDUCATION/TRAINING PROGRAM

## 2024-06-26 RX ORDER — ESCITALOPRAM OXALATE 20 MG/1
20 TABLET ORAL DAILY
Qty: 90 TABLET | Refills: 0 | Status: SHIPPED | OUTPATIENT
Start: 2024-06-26 | End: 2024-09-18

## 2024-06-26 RX ORDER — ATOMOXETINE 80 MG/1
80 CAPSULE ORAL DAILY
Qty: 30 CAPSULE | Refills: 1 | OUTPATIENT
Start: 2024-06-26

## 2024-06-26 RX ORDER — ATOMOXETINE 80 MG/1
80 CAPSULE ORAL DAILY
Qty: 30 CAPSULE | Refills: 1 | Status: SHIPPED | OUTPATIENT
Start: 2024-06-26 | End: 2024-08-16

## 2024-06-26 ASSESSMENT — ANXIETY QUESTIONNAIRES
4. TROUBLE RELAXING: NEARLY EVERY DAY
GAD7 TOTAL SCORE: 13
6. BECOMING EASILY ANNOYED OR IRRITABLE: NEARLY EVERY DAY
GAD7 TOTAL SCORE: 13
7. FEELING AFRAID AS IF SOMETHING AWFUL MIGHT HAPPEN: SEVERAL DAYS
1. FEELING NERVOUS, ANXIOUS, OR ON EDGE: MORE THAN HALF THE DAYS
2. NOT BEING ABLE TO STOP OR CONTROL WORRYING: SEVERAL DAYS
5. BEING SO RESTLESS THAT IT IS HARD TO SIT STILL: MORE THAN HALF THE DAYS
3. WORRYING TOO MUCH ABOUT DIFFERENT THINGS: SEVERAL DAYS
7. FEELING AFRAID AS IF SOMETHING AWFUL MIGHT HAPPEN: SEVERAL DAYS
GAD7 TOTAL SCORE: 13
8. IF YOU CHECKED OFF ANY PROBLEMS, HOW DIFFICULT HAVE THESE MADE IT FOR YOU TO DO YOUR WORK, TAKE CARE OF THINGS AT HOME, OR GET ALONG WITH OTHER PEOPLE?: VERY DIFFICULT
IF YOU CHECKED OFF ANY PROBLEMS ON THIS QUESTIONNAIRE, HOW DIFFICULT HAVE THESE PROBLEMS MADE IT FOR YOU TO DO YOUR WORK, TAKE CARE OF THINGS AT HOME, OR GET ALONG WITH OTHER PEOPLE: VERY DIFFICULT

## 2024-06-26 ASSESSMENT — PATIENT HEALTH QUESTIONNAIRE - PHQ9
SUM OF ALL RESPONSES TO PHQ QUESTIONS 1-9: 11
SUM OF ALL RESPONSES TO PHQ QUESTIONS 1-9: 11
10. IF YOU CHECKED OFF ANY PROBLEMS, HOW DIFFICULT HAVE THESE PROBLEMS MADE IT FOR YOU TO DO YOUR WORK, TAKE CARE OF THINGS AT HOME, OR GET ALONG WITH OTHER PEOPLE: VERY DIFFICULT

## 2024-06-26 NOTE — PATIENT INSTRUCTIONS
Aly Chang,    Thank you for allowing [facility] to manage your care.    Increase lexapro from 15 mg to 20 mg . Gabapentin 600 mg am 900mg pm to 600mg am and 1200 mg pm    I ordered a ultrasound , please call diagnostic imaging [phone] to schedule your test.      For your convenience, test results are released as soon as they are available  Please allow 1-2 business days for me to send you a comment about your results.  If not done so, I encourage you to login into [employer] ([email or web address] to review your results in real time.     If you have any questions or concerns, please feel free to call us at [phone].    Sincerely,    Dr. Hall    Did you know?      You can schedule a video visit for follow-up appointments as well as future appointments for certain conditions.  Please see the below link.     [email or web address]    If you have not already done so,  I encourage you to sign up for [employer] ([email or web address]).  This will allow you to review your results, securely communicate with a provider, and schedule virtual visits as well.

## 2024-06-26 NOTE — PROGRESS NOTES
Charlene is a 59 year old who is being evaluated via a billable video visit.    How would you like to obtain your AVS? [employer]Path  If the video visit is dropped, the invitation should be resent by: Send to e-mail at: [email or web address]  Will anyone else be joining your video visit? No      Assessment & Plan     Generalized anxiety disorder  She still gets anxious but it is Improving,Increase lexapro from 15 mg to 20 mg. Increase Gabapentin 600 mg am 900mg pm to 600mg am and 1200 mg pm  - escitalopram (LEXAPRO) 20 MG tablet; Take 1 tablet (20 mg) by mouth daily    Major depressive disorder, recurrent episode, moderate (H)  As above  - escitalopram (LEXAPRO) 20 MG tablet; Take 1 tablet (20 mg) by mouth daily    Pelvic pain in female    - US Pelvic Complete with Transvaginal; Future    Primary insomnia  Continue gabapentin    ADHD  Stable, Continue Atomoxetine          Subjective   Charlene is a 59 year old, presenting for the following health issues:  Mental Health Fup      6/26/2024    10:50 AM   Additional Questions   Roomed by Patient completed echeck in via [employer]     History of Present Illness       Mental Health Follow-up:  Patient presents to follow-up on Depression & Anxiety.Patient's depression since last visit has been:  Better  The patient is not having other symptoms associated with depression.  Patient's anxiety since last visit has been:  Better  The patient is not having other symptoms associated with anxiety.  Any significant life events: relationship concerns, financial concerns and grief or loss  Patient is not feeling anxious or having panic attacks.  Patient has no concerns about alcohol or drug use.    She will be starting therapy today. Her AHDHD Is stable on medication.  She complains of right sided pelvic pain  that is constant. It feels like Ovulation. She is postmenopausal.    Social History     Tobacco Use    Smoking status: Never    Smokeless tobacco: Never   Vaping Use    Vaping status: Never Used    Substance Use Topics    Alcohol use: Yes     Comment: 3-4 drinks a week at most- wine     Drug use: No         1/30/2024     6:51 AM 6/11/2024     8:46 AM 6/26/2024    10:40 AM   PHQ   PHQ-9 Total Score 0 21 11   Q9: Thoughts of better off dead/self-harm past 2 weeks Not at all Not at all Not at all         3/25/2024     2:01 PM 6/11/2024     8:45 AM 6/26/2024    10:41 AM   JOANA-7 SCORE   Total Score 10 (moderate anxiety) 14 (moderate anxiety) 13 (moderate anxiety)   Total Score 10 14 13         6/26/2024    10:40 AM   Last PHQ-9   1.  Little interest or pleasure in doing things 2   2.  Feeling down, depressed, or hopeless 1   3.  Trouble falling or staying asleep, or sleeping too much 3   4.  Feeling tired or having little energy 1   5.  Poor appetite or overeating 1   6.  Feeling bad about yourself 1   7.  Trouble concentrating 2   8.  Moving slowly or restless 0   Q9: Thoughts of better off dead/self-harm past 2 weeks 0   PHQ-9 Total Score 11         6/26/2024    10:41 AM   JOANA-7    1. Feeling nervous, anxious, or on edge 2   2. Not being able to stop or control worrying 1   3. Worrying too much about different things 1   4. Trouble relaxing 3   5. Being so restless that it is hard to sit still 2   6. Becoming easily annoyed or irritable 3   7. Feeling afraid, as if something awful might happen 1   JOANA-7 Total Score 13   If you checked any problems, how difficult have they made it for you to do your work, take care of things at home, or get along with other people? Very difficult                 Review of Systems  Constitutional, neuro, ENT, endocrine, pulmonary, cardiac, gastrointestinal, genitourinary, musculoskeletal, integument and psychiatric systems are negative, except as otherwise noted.      Objective           Vitals:  No vitals were obtained today due to virtual visit.    Physical Exam   GENERAL: alert and no distress  EYES: Eyes grossly normal to inspection.  No discharge or erythema, or obvious  scleral/conjunctival abnormalities.  RESP: No audible wheeze, cough, or visible cyanosis.      PSYCH: Appropriate affect, tone, and pace of words        Video-Visit Details    Type of service:  Video Visit   Originating Location (pt. Location): Home    Distant Location (provider location):  On-site  Platform used for Video Visit: Aiotra

## 2024-07-15 DIAGNOSIS — I10 HTN, GOAL BELOW 130/80: ICD-10-CM

## 2024-07-15 RX ORDER — LISINOPRIL 40 MG/1
40 TABLET ORAL DAILY
Qty: 90 TABLET | Refills: 0 | Status: SHIPPED | OUTPATIENT
Start: 2024-07-15 | End: 2024-10-07

## 2024-07-22 ENCOUNTER — TELEPHONE (OUTPATIENT)
Dept: FAMILY MEDICINE | Facility: CLINIC | Age: 59
End: 2024-07-22

## 2024-07-22 NOTE — LETTER
September 16, 2024    To  Charlene Morales  [address] [place] [address]    Your team at [facility] cares about your health. We have reviewed your chart and based on our findings; we are making the following recommendations to better manage your health.     You are in particular need of attention regarding the following:     Schedule Annual MAMMOGRAPHY. [facility] scheduling number is [phone] or schedule in [employer] (self referral).    If you have already completed these items, please contact the clinic via phone or   [employer] so your care team can review and update your records. Thank you for   choosing [facility] for your healthcare needs. For any questions,   concerns, or to schedule an appointment please contact our clinic.    Healthy Regards,      Your [facility] Care Team

## 2024-07-22 NOTE — TELEPHONE ENCOUNTER
Patient Quality Outreach    Patient is due for the following:   Breast Cancer Screening - Mammogram    Next Steps:   Schedule a office visit for a Mammogram    Type of outreach:    Sent letter.      Questions for provider review:    None           Evelyn Ocasio, CMA

## 2024-07-22 NOTE — LETTER
July 22, 2024    To  Charlene Morales  [address] [place] [address]    Your team at [facility] cares about your health. We have reviewed your chart and based on our findings; we are making the following recommendations to better manage your health.     You are in particular need of attention regarding the following:     Schedule Annual MAMMOGRAPHY. [facility] scheduling number is [phone] or schedule in [employer] (self referral).    If you have already completed these items, please contact the clinic via phone or   [employer] so your care team can review and update your records. Thank you for   choosing [facility] for your healthcare needs. For any questions,   concerns, or to schedule an appointment please contact our clinic.    Healthy Regards,      Your [facility] Care Team

## 2024-07-23 DIAGNOSIS — F33.1 MAJOR DEPRESSIVE DISORDER, RECURRENT EPISODE, MODERATE (H): ICD-10-CM

## 2024-07-23 DIAGNOSIS — F51.01 PRIMARY INSOMNIA: ICD-10-CM

## 2024-07-23 RX ORDER — TRAZODONE HYDROCHLORIDE 100 MG/1
TABLET ORAL
Qty: 180 TABLET | Refills: 0 | Status: SHIPPED | OUTPATIENT
Start: 2024-07-23

## 2024-08-13 ENCOUNTER — OFFICE VISIT (OUTPATIENT)
Dept: FAMILY MEDICINE | Facility: CLINIC | Age: 59
End: 2024-08-13
Payer: COMMERCIAL

## 2024-08-13 VITALS
TEMPERATURE: 97.8 F | HEIGHT: 65 IN | OXYGEN SATURATION: 100 % | HEART RATE: 102 BPM | SYSTOLIC BLOOD PRESSURE: 132 MMHG | RESPIRATION RATE: 18 BRPM | BODY MASS INDEX: 24.72 KG/M2 | WEIGHT: 148.4 LBS | DIASTOLIC BLOOD PRESSURE: 89 MMHG

## 2024-08-13 DIAGNOSIS — Z01.818 PREOPERATIVE EXAMINATION: Primary | ICD-10-CM

## 2024-08-13 DIAGNOSIS — M19.049 HAND ARTHRITIS: ICD-10-CM

## 2024-08-13 PROCEDURE — 99213 OFFICE O/P EST LOW 20 MIN: CPT | Performed by: NURSE PRACTITIONER

## 2024-08-13 ASSESSMENT — PATIENT HEALTH QUESTIONNAIRE - PHQ9
SUM OF ALL RESPONSES TO PHQ QUESTIONS 1-9: 4
SUM OF ALL RESPONSES TO PHQ QUESTIONS 1-9: 4
10. IF YOU CHECKED OFF ANY PROBLEMS, HOW DIFFICULT HAVE THESE PROBLEMS MADE IT FOR YOU TO DO YOUR WORK, TAKE CARE OF THINGS AT HOME, OR GET ALONG WITH OTHER PEOPLE: SOMEWHAT DIFFICULT

## 2024-08-13 NOTE — PROGRESS NOTES
Preoperative Evaluation  [address] [address]  Phone: [phone]  Primary Provider: Luisa Hall MD  Pre-op Performing Provider: NADINE Rios CNP  Aug 13, 2024             8/13/2024   Surgical Information   What procedure is being done? rt thumb surgery   Facility or Hospital where procedure/surgery will be performed: [facility]   Who is doing the procedure / surgery? Dr Thibodeaux   Date of surgery / procedure: Aug 14   Time of surgery / procedure: 615   Where do you plan to recover after surgery? at home with family        Fax number for surgical facility: [phone] (Number to call)     Assessment & Plan     The proposed surgical procedure is considered LOW risk.    Preoperative examination  Denies history of complications from anesthesia. Pt has no active cardiac conditions. No history of CAD, prior MI, CHF,  CKD, or DM.   Good functional status. Pt is able to walk four blocks or climb two flights of stairs.Patient denies CP, SOB, or palpitations during the last six months. Stable. No changes in management.     Right hand arthritis  Severe osteoarthritis of the carpometacarpal (CMC) joint   Plan for R thumb carpal, metacarpal suture suspension plasty on 8/14/24     - No identified additional risk factors other than previously addressed    Preoperative Medication Instructions  Antiplatelet or Anticoagulation Medication Instructions   - Patient is on no antiplatelet or anticoagulation medications.  - Do not take Excedrin    Additional Medication Instructions  Do not take any meds prior to your surgery. You can take them after surgery as tolerated.     Recommendation  Approval given to proceed with proposed procedure, without further diagnostic evaluation.    Christi Chang is a 59 year old, presenting for the following:  Pre-Op Exam          8/13/2024     2:09 PM   Additional Questions   Roomed by  Venancio SPIVEY     HPI related to upcoming procedure: Hx of arthritis bilateral hands         8/13/2024   Pre-Op Questionnaire   Have you ever had a heart attack or stroke? No   Have you ever had surgery on your heart or blood vessels, such as a stent placement, a coronary artery bypass, or surgery on an artery in your head, neck, heart, or legs? No   Do you have chest pain with activity? No   Do you have a history of heart failure? No   Do you currently have a cold, bronchitis or symptoms of other infection? No   Do you have a cough, shortness of breath, or wheezing? No   Do you or anyone in your family have previous history of blood clots? No   Do you or does anyone in your family have a serious bleeding problem such as prolonged bleeding following surgeries or cuts? No   Have you ever had problems with anemia or been told to take iron pills? No   Have you had any abnormal blood loss such as black, tarry or bloody stools, or abnormal vaginal bleeding? No   Have you ever had a blood transfusion? No   Are you willing to have a blood transfusion if it is medically needed before, during, or after your surgery? Yes   Have you or any of your relatives ever had problems with anesthesia? No   Do you have sleep apnea, excessive snoring or daytime drowsiness? No   Do you have any artifical heart valves or other implanted medical devices like a pacemaker, defibrillator, or continuous glucose monitor? No   Do you have artificial joints? No   Are you allergic to latex? No        Health Care Directive  Patient does not have a Health Care Directive or Living Will: Discussed advance care planning with patient; however, patient declined at this time.    Preoperative Review of    reviewed - no record of controlled substances prescribed.    PDMP Review         Value Time User    State PDMP site checked  Yes 8/13/2024  2:31 PM Sintia Crow APRN CNP              Patient Active Problem List    Diagnosis Date Noted    Benign  hypertension with CKD (chronic kidney disease) stage III (H) 07/05/2023     Priority: Medium    Situational anxiety 03/02/2020     Priority: Medium    Primary insomnia 01/14/2016     Priority: Medium    Major depressive disorder, recurrent episode, moderate (H) 10/04/2015     Priority: Medium    Hyperlipidemia LDL goal <130 08/12/2013     Priority: Medium    HCH 08/08/2013     Priority: Medium     EMERGENCY CARE PLAN  August 8, 2013: No current Care Coordination follow up planned. Please refer if Care Coordination services are needed.    Presenting Problem Signs and Symptoms Treatment Plan   Questions or concerns   during clinic hours   I will call my clinic directly:  [address] [address]  [phone].   Questions or concerns outside clinic hours   I will call the 24 hour nurse line at   [phone] or [facility].   Need to schedule an appointment   I will call the 24 hour scheduling team at [phone] or my clinic directly at [phone].    Same day treatment     I will call my clinic first, nurse line if after hours, urgent care and express care if needed.     Clinic care coordination services (regular clinic hours)     I will call a clinic care coordinator directly:     Ramiro Anand RN  Mon, Tues, Fri - [phone]  Wed, Thurs - [phone]    Nidhi Garcias :    [phone]    Or call my clinic at [phone] and ask to speak with care coordination.     Crisis Services: Behavioral or Mental Health  [facility] 24 Hour Phone Line  [phone]    [facility] 24 Hour Crisis Services  [phone]    [facility] (Behavioral Health Providers) Network [phone]    [facility]   [phone]         Emergency treatment -- Immediately    CAll 911           Peptic ulcer 01/08/2006     Priority: Medium     October 9, 2007 -  Likely recurrence. Incrase to 40mg/day and check h pylori.  Possible Abd us if ongoing.  Time meds differently.   TCN or Neurtontin might effect?  October 15, 2007 - H pylori.  Quad therapy.   Problem list name updated by automated process. Provider to review      Headache 01/08/2006     Priority: Medium     September 15, 2008- Doing well but weight gain on neurontin. Not sure this is helping. Tapering off gabapentin.     Problem list name updated by automated process. Provider to review      Hypertension, benign essential, goal below 140/90 01/08/2006     Priority: Low     September 15, 2008 - Ambulatory BP within goal. Labs reviewed/updated. Reviewed care goals.  PRN refills with close ambulatory monitoring.          Past Medical History:   Diagnosis Date    Anemia, unspecified     CONVULSIONS, OTHER 1/8/2006    History of seizure with withdrawal of Butalbital    Depressive disorder, not elsewhere classified     Headache(784.0)     Other and unspecified alcohol dependence, unspecified drinking behavior     Other convulsions     Peptic ulcer, unspecified site, unspecified as acute or chronic, without mention of hemorrhage, perforation, or obstruction     Premenstrual tension syndromes     RENAL CYST 6/30/2005 June 30, 2005 - 3.7cm simple cyst.  Recheck 6 months. January 10, 2006 - Will recheck. 5/06 - stable.    Unspecified essential hypertension     Unspecified gastritis and gastroduodenitis without mention of hemorrhage     Unspecified hemorrhoids without mention of complication      Past Surgical History:   Procedure Laterality Date    HYSTERECTOMY, PAP NO LONGER INDICATED  2004    Still has ovaries and     SURGICAL HISTORY OF -   8/23/2004    Hemorrhoidectomy & Partial Lateral Sphincterotomy    SURGICAL HISTORY OF -   5/2/2003    Hemorrhoid Banding x2    ZZHC COLONOSCOPY THRU STOMA, DIAGNOSTIC  5/2/03    Normal. Repeat at age 52     Current Outpatient Medications   Medication Sig Dispense Refill    atomoxetine (STRATTERA) 80 MG capsule Take 1 capsule (80 mg) by mouth daily 30 capsule 1    atorvastatin (LIPITOR) 40 MG  tablet TAKE ONE TABLET BY MOUTH ONCE DAILY 90 tablet 0    Blood Pressure Monitoring (BLOOD PRESSURE CUFF) MISC Take measurement 3 times a week 1 each 0    escitalopram (LEXAPRO) 20 MG tablet Take 1 tablet (20 mg) by mouth daily 90 tablet 0    gabapentin (NEURONTIN) 300 MG capsule TAKE 2 CAPSULES (600 MG) BY MOUTH 2 TIMES DAILY 360 capsule 3    lisinopril (ZESTRIL) 40 MG tablet TAKE ONE TABLET BY MOUTH ONCE DAILY 90 tablet 0    LORazepam (ATIVAN) 0.5 MG tablet TAKE ONE TABLET BY MOUTH ONCE DAILY AS NEEDED FOR ANXIETY (USE SPARINGLY) 10 tablet 0    omeprazole (PRILOSEC) 20 MG DR capsule TAKE ONE TO TWO CAPSULES BY MOUTH EVERY  capsule 0    traZODone (DESYREL) 100 MG tablet TAKE TWO TABLETS BY MOUTH EVERY EVENING AT BEDTIME 180 tablet 0    amLODIPine (NORVASC) 10 MG tablet TAKE ONE TABLET BY MOUTH ONCE DAILY. (Patient not taking: Reported on 6/11/2024) 90 tablet 3    buPROPion (WELLBUTRIN XL) 300 MG 24 hr tablet Take 1 tablet (300 mg) by mouth every morning (Patient not taking: Reported on 5/3/2022) 90 tablet 3       Allergies   Allergen Reactions    Sulfa Antibiotics         Social History     Tobacco Use    Smoking status: Never    Smokeless tobacco: Never   Substance Use Topics    Alcohol use: Yes     Comment: 3-4 drinks a week at most- wine        History   Drug Use No         Review of Systems  CONSTITUTIONAL: NEGATIVE for fever, chills, change in weight  INTEGUMENTARY/SKIN: NEGATIVE for worrisome rashes, moles or lesions  EYES: NEGATIVE for vision changes or irritation  ENT/MOUTH: NEGATIVE for ear, mouth and throat problems  RESP: NEGATIVE for significant cough or SOB  BREAST: NEGATIVE for masses, tenderness or discharge  CV: NEGATIVE for chest pain, palpitations or peripheral edema  GI: NEGATIVE for nausea, abdominal pain, heartburn, or change in bowel habits  : NEGATIVE for frequency, dysuria, or hematuria  MUSCULOSKELETAL: NEGATIVE for significant arthralgias or myalgia  NEURO: NEGATIVE for weakness,  "dizziness or paresthesias  ENDOCRINE: NEGATIVE for temperature intolerance, skin/hair changes  HEME: NEGATIVE for bleeding problems  PSYCHIATRIC: NEGATIVE for changes in mood or affect    Answers submitted by the patient for this visit:  Patient Health Questionnaire (Submitted on 8/13/2024)  If you checked off any problems, how difficult have these problems made it for you to do your work, take care of things at home, or get along with other people?: Somewhat difficult  PHQ9 TOTAL SCORE: 4  Objective    /89 (BP Location: Left arm, Patient Position: Sitting, Cuff Size: Adult Regular)   Pulse 102   Temp 97.8  F (36.6  C) (Temporal)   Resp 18   Ht 1.638 m (5' 4.5\")   Wt 67.3 kg (148 lb 6.4 oz)   SpO2 100%   BMI 25.08 kg/m     Estimated body mass index is 25.08 kg/m  as calculated from the following:    Height as of this encounter: 1.638 m (5' 4.5\").    Weight as of this encounter: 67.3 kg (148 lb 6.4 oz).  Physical Exam  GENERAL: alert and no distress  EYES: Eyes grossly normal to inspection, PERRL and conjunctivae and sclerae normal  HENT: ear canals and TM's normal, nose and mouth without ulcers or lesions  NECK: no adenopathy, no asymmetry, masses, or scars  RESP: lungs clear to auscultation - no rales, rhonchi or wheezes  CV: regular rate and rhythm, normal S1 S2, no S3 or S4, no murmur, click or rub, no peripheral edema  ABDOMEN: soft, nontender, no hepatosplenomegaly, no masses and bowel sounds normal  MS: no gross musculoskeletal defects noted, no edema  SKIN: no suspicious lesions or rashes  NEURO: Normal strength and tone, mentation intact and speech normal  PSYCH: mentation appears normal, affect normal/bright    Recent Labs   Lab Test 01/30/24  0747   HGB 14.2      POTASSIUM 4.2   CR 0.80   A1C 5.8*      Diagnostics  No results found for this or any previous visit (from the past 24 hour(s)).   No EKG required for low risk surgery (cataract, skin procedure, breast biopsy, etc).    Revised " Cardiac Risk Index (RCRI)  The patient has the following serious cardiovascular risks for perioperative complications:   - No serious cardiac risks = 0 points     RCRI Interpretation: 0 points: Class I (very low risk - 0.4% complication rate)  RCRI-Very low risk: Class 1 0.4% complication rate             Total Score: -    Criteria with incomplete data:    RCRI: High Risk Surgery    RCRI: CAD    RCRI: CHF    RCRI: Cerebrovascular Disease     RCRI: Diabetes    RCRI: Elevated Creatinine      - This score is not calculated because of inadequate data         Signed Electronically by: NADINE Rios CNP  A copy of this evaluation report is provided to the requesting physician.

## 2024-08-13 NOTE — PATIENT INSTRUCTIONS
How to Take Your Medication Before Surgery  Preoperative Medication Instructions   Antiplatelet or Anticoagulation Medication Instructions   - Patient is on no antiplatelet or anticoagulation medications.  - Do not take Excedrin    Additional Medication Instructions  Do not take any meds prior to your surgery. You can take them after surgery as tolerated.

## 2024-08-14 ENCOUNTER — TRANSFERRED RECORDS (OUTPATIENT)
Dept: HEALTH INFORMATION MANAGEMENT | Facility: CLINIC | Age: 59
End: 2024-08-14
Payer: COMMERCIAL

## 2024-08-16 DIAGNOSIS — F90.0 ATTENTION DEFICIT HYPERACTIVITY DISORDER (ADHD), PREDOMINANTLY INATTENTIVE TYPE: ICD-10-CM

## 2024-08-16 RX ORDER — ATOMOXETINE 80 MG/1
80 CAPSULE ORAL DAILY
Qty: 30 CAPSULE | Refills: 3 | Status: SHIPPED | OUTPATIENT
Start: 2024-08-16

## 2024-08-27 ENCOUNTER — TRANSFERRED RECORDS (OUTPATIENT)
Dept: HEALTH INFORMATION MANAGEMENT | Facility: CLINIC | Age: 59
End: 2024-08-27
Payer: COMMERCIAL

## 2024-09-08 ENCOUNTER — MYC REFILL (OUTPATIENT)
Dept: FAMILY MEDICINE | Facility: CLINIC | Age: 59
End: 2024-09-08
Payer: COMMERCIAL

## 2024-09-08 DIAGNOSIS — K27.9 PEPTIC ULCER: ICD-10-CM

## 2024-09-09 ENCOUNTER — TELEPHONE (OUTPATIENT)
Dept: FAMILY MEDICINE | Facility: CLINIC | Age: 59
End: 2024-09-09
Payer: COMMERCIAL

## 2024-09-09 DIAGNOSIS — F41.8 SITUATIONAL ANXIETY: ICD-10-CM

## 2024-09-09 DIAGNOSIS — F32.1 MAJOR DEPRESSIVE DISORDER, SINGLE EPISODE, MODERATE (H): ICD-10-CM

## 2024-09-09 RX ORDER — GABAPENTIN 300 MG/1
CAPSULE ORAL
Qty: 540 CAPSULE | Refills: 3 | Status: SHIPPED | OUTPATIENT
Start: 2024-09-09

## 2024-09-09 NOTE — TELEPHONE ENCOUNTER
"Patient calling, states that she is in need of a refill of her Gabapentin. 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    Patient states that her dosage instructions were changed back in June 2024 from PCP to take Gabapentin 300 mg, 2 tablets in the AM (600 mg total) and 4 tablets in the PM (1200mg total). Please see virtual visit 6/26/24 for this documentation:    \"Generalized anxiety disorder  She still gets anxious but it is Improving,Increase lexapro from 15 mg to 20 mg. Increase Gabapentin 600 mg am 900mg pm to 600mg am and 1200 mg pm  - escitalopram (LEXAPRO) 20 MG tablet; Take 1 tablet (20 mg) by mouth daily\"    Patient asking if this can be completed by 5pm tonight 09/09/24, writer advised that a high priority message will be sent to PCP requesting this, however, we cannot guarantee that this will be completed within that time frame as we request 72 business hours for refills, patient states understanding.    Pharmacy:  [facility] - [id number] [facility]     Patient requesting callback when this has been completed, CB#: [phone], dvm ok    Thanks,  ROBER ValladaresN RN  [facility]    "

## 2024-09-10 NOTE — TELEPHONE ENCOUNTER
Notified patient that medication was sent to pharmacy.    Patient stated understanding and agreeable with the plan of care.     Kika PATEL RN  Triage Nurse  [facility]

## 2024-09-16 NOTE — TELEPHONE ENCOUNTER
Patient Quality Outreach    Patient is due for the following:   Breast Cancer Screening - Mammogram    Next Steps:   Schedule a office visit for a Mammogram    Type of outreach:    Sent letter.    Next Steps:  Reach out within 90 days via Letter.    Max number of attempts reached: Yes. Will try again in 90 days if patient still on fail list.    Questions for provider review:    None           Evelyn Ocasio, CMA

## 2024-09-18 DIAGNOSIS — F33.1 MAJOR DEPRESSIVE DISORDER, RECURRENT EPISODE, MODERATE (H): ICD-10-CM

## 2024-09-18 DIAGNOSIS — F41.1 GENERALIZED ANXIETY DISORDER: ICD-10-CM

## 2024-09-18 RX ORDER — ESCITALOPRAM OXALATE 20 MG/1
20 TABLET ORAL DAILY
Qty: 90 TABLET | Refills: 2 | Status: SHIPPED | OUTPATIENT
Start: 2024-09-18

## 2024-10-07 DIAGNOSIS — I10 HTN, GOAL BELOW 130/80: ICD-10-CM

## 2024-10-07 DIAGNOSIS — E78.5 HYPERLIPIDEMIA LDL GOAL <130: ICD-10-CM

## 2024-10-07 RX ORDER — ATORVASTATIN CALCIUM 40 MG/1
TABLET, FILM COATED ORAL
Qty: 90 TABLET | Refills: 0 | Status: SHIPPED | OUTPATIENT
Start: 2024-10-07

## 2024-10-07 RX ORDER — LISINOPRIL 40 MG/1
40 TABLET ORAL DAILY
Qty: 90 TABLET | Refills: 0 | Status: SHIPPED | OUTPATIENT
Start: 2024-10-07

## 2024-10-08 ENCOUNTER — TRANSFERRED RECORDS (OUTPATIENT)
Dept: HEALTH INFORMATION MANAGEMENT | Facility: CLINIC | Age: 59
End: 2024-10-08
Payer: COMMERCIAL

## 2024-10-15 DIAGNOSIS — F51.01 PRIMARY INSOMNIA: ICD-10-CM

## 2024-10-15 DIAGNOSIS — F33.1 MAJOR DEPRESSIVE DISORDER, RECURRENT EPISODE, MODERATE (H): ICD-10-CM

## 2024-10-15 RX ORDER — TRAZODONE HYDROCHLORIDE 100 MG/1
TABLET ORAL
Qty: 180 TABLET | Refills: 0 | Status: SHIPPED | OUTPATIENT
Start: 2024-10-15

## 2024-10-21 ENCOUNTER — TELEPHONE (OUTPATIENT)
Dept: FAMILY MEDICINE | Facility: CLINIC | Age: 59
End: 2024-10-21

## 2024-10-21 NOTE — TELEPHONE ENCOUNTER
Patient Quality Outreach    Patient is due for the following:   Breast Cancer Screening - Mammogram    Next Steps:   Schedule a office visit for a Mammogram.     Type of outreach:    Sent [employer] message.    Questions for provider review:    None           Evelyn Ocasio, CMA

## 2024-12-02 DIAGNOSIS — K27.9 PEPTIC ULCER: ICD-10-CM

## 2024-12-09 DIAGNOSIS — F41.1 GENERALIZED ANXIETY DISORDER: ICD-10-CM

## 2024-12-09 RX ORDER — LORAZEPAM 0.5 MG/1
TABLET ORAL
Qty: 10 TABLET | Refills: 0 | Status: SHIPPED | OUTPATIENT
Start: 2024-12-09

## 2024-12-16 NOTE — TELEPHONE ENCOUNTER
Patient Quality Outreach    Patient is due for the following:   Colon Cancer Screening  Breast Cancer Screening - Mammogram        Topic Date Due    Hepatitis B Vaccine (3 of 3 - 19+ 3-dose series) 06/28/2022    Flu Vaccine (1) 09/01/2024    COVID-19 Vaccine (4 - 2024-25 season) 09/01/2024       Action(s) Taken:   Patient has upcoming appointment, these items will be addressed at that time.    Type of outreach:    Chart review performed, no outreach needed.    Questions for provider review:    None           Evelyn Ocasio, CMA

## 2025-01-10 DIAGNOSIS — E78.5 HYPERLIPIDEMIA LDL GOAL <130: ICD-10-CM

## 2025-01-10 DIAGNOSIS — I10 HTN, GOAL BELOW 130/80: ICD-10-CM

## 2025-01-13 DIAGNOSIS — F51.01 PRIMARY INSOMNIA: ICD-10-CM

## 2025-01-13 DIAGNOSIS — F33.1 MAJOR DEPRESSIVE DISORDER, RECURRENT EPISODE, MODERATE (H): ICD-10-CM

## 2025-01-13 RX ORDER — TRAZODONE HYDROCHLORIDE 100 MG/1
TABLET ORAL
Qty: 180 TABLET | Refills: 0 | Status: SHIPPED | OUTPATIENT
Start: 2025-01-13

## 2025-01-13 RX ORDER — LISINOPRIL 40 MG/1
40 TABLET ORAL DAILY
Qty: 90 TABLET | Refills: 0 | Status: SHIPPED | OUTPATIENT
Start: 2025-01-13

## 2025-01-13 RX ORDER — ATORVASTATIN CALCIUM 40 MG/1
TABLET, FILM COATED ORAL
Qty: 90 TABLET | Refills: 0 | Status: SHIPPED | OUTPATIENT
Start: 2025-01-13

## 2025-01-16 ENCOUNTER — NURSE TRIAGE (OUTPATIENT)
Dept: FAMILY MEDICINE | Facility: CLINIC | Age: 60
End: 2025-01-16
Payer: COMMERCIAL

## 2025-01-16 NOTE — TELEPHONE ENCOUNTER
"Patient calling to report a rash on her left hip that she noticed a week ago, she reports its very itchy and this is how she it felt when she had shingles years ago. Patient declined a visit today or tomorrow per protocol. She requested a visit Monday so she can monitor it over the weekend. She will go to the urgent care if symptoms are worsening. She reports an ache but no severe pain. RN advised to call back with questions or concerns.     Lilliana Almazan RN   Reason for Disposition   Shingles rash and onset > 72 hours ago    Additional Information   Negative: Difficult to awaken or acting confused (e.g., disoriented, slurred speech)   Negative: Sounds like a life-threatening emergency to the triager   Negative: Localized rash and doesn't match the SYMPTOMS of shingles   Negative: Back pain and doesn't match the SYMPTOMS of shingles   Negative: Shingles Vaccine (Recombinant Zoster Vaccine; RZV; Shingrix), questions about   Negative: Shingles rash of face and eye pain or blurred vision   Negative: Shingles rash on the eyelid or tip of the nose   Negative: Shingles rash and spots start appearing other places on body   Negative: Patient sounds very sick or weak to the triager   Negative: Shingles rash (matches SYMPTOMS) and weak immune system (e.g., HIV positive,  cancer chemotherapy, chronic steroid treatment, splenectomy) and NOT taking antiviral medication   Negative: Shingles rash of face and facial weakness   Negative: Shingles rash of face or ear and earache or ringing in the ear   Negative: Fever > 100.4 F (38.0 C)   Negative: SEVERE pain (e.g., excruciating)   Negative: Shingles rash (matches SYMPTOMS) and onset within past 72 hours   Negative: Looks infected (spreading redness, pus) and no fever   Negative: Patient wants to be seen    Answer Assessment - Initial Assessment Questions  1. APPEARANCE of RASH: \"Describe the rash.\"       Itchy bigger welts that now are dots   2. LOCATION: \"Where is the rash " "located?\"       Left hip  3. ONSET: \"When did the rash start?\"       Last week   4. ITCHING: \"Does the rash itch?\" If so, ask: \"How bad is the itch?\"  (Scale 1-10; or mild, moderate, severe)      Yes, the itching is getting worse   5. PAIN: \"Does the rash hurt?\" If so, ask: \"How bad is the pain?\"  (Scale 1-10; or mild, moderate, severe)      Ache pain   6. OTHER SYMPTOMS: \"Do you have any other symptoms?\" (e.g., fever)      Feels feverish but no fever  7. PREGNANCY: \"Is there any chance you are pregnant?\" \"When was your last menstrual period?\"      NA    Protocols used: Shingles-A-OH    "

## 2025-01-27 ENCOUNTER — PATIENT OUTREACH (OUTPATIENT)
Dept: CARE COORDINATION | Facility: CLINIC | Age: 60
End: 2025-01-27
Payer: COMMERCIAL

## 2025-02-01 DIAGNOSIS — F90.0 ATTENTION DEFICIT HYPERACTIVITY DISORDER (ADHD), PREDOMINANTLY INATTENTIVE TYPE: ICD-10-CM

## 2025-02-03 RX ORDER — ATOMOXETINE 80 MG/1
80 CAPSULE ORAL DAILY
Qty: 30 CAPSULE | Refills: 0 | OUTPATIENT
Start: 2025-02-03

## 2025-02-10 ENCOUNTER — PATIENT OUTREACH (OUTPATIENT)
Dept: CARE COORDINATION | Facility: CLINIC | Age: 60
End: 2025-02-10
Payer: COMMERCIAL

## 2025-02-12 DIAGNOSIS — F90.0 ATTENTION DEFICIT HYPERACTIVITY DISORDER (ADHD), PREDOMINANTLY INATTENTIVE TYPE: ICD-10-CM

## 2025-02-12 RX ORDER — ATOMOXETINE 80 MG/1
80 CAPSULE ORAL DAILY
Qty: 30 CAPSULE | Refills: 0 | Status: SHIPPED | OUTPATIENT
Start: 2025-02-12

## 2025-02-17 ENCOUNTER — VIRTUAL VISIT (OUTPATIENT)
Dept: FAMILY MEDICINE | Facility: CLINIC | Age: 60
End: 2025-02-17
Payer: COMMERCIAL

## 2025-02-17 DIAGNOSIS — F33.1 MAJOR DEPRESSIVE DISORDER, RECURRENT EPISODE, MODERATE (H): ICD-10-CM

## 2025-02-17 DIAGNOSIS — N18.31 CHRONIC KIDNEY DISEASE, STAGE 3A (H): ICD-10-CM

## 2025-02-17 DIAGNOSIS — Z12.31 ENCOUNTER FOR SCREENING MAMMOGRAM FOR BREAST CANCER: ICD-10-CM

## 2025-02-17 DIAGNOSIS — H65.01 NON-RECURRENT ACUTE SEROUS OTITIS MEDIA OF RIGHT EAR: ICD-10-CM

## 2025-02-17 DIAGNOSIS — Z12.11 SCREEN FOR COLON CANCER: ICD-10-CM

## 2025-02-17 DIAGNOSIS — J01.00 ACUTE NON-RECURRENT MAXILLARY SINUSITIS: ICD-10-CM

## 2025-02-17 PROBLEM — F10.21 ALCOHOL DEPENDENCE IN REMISSION (H): Status: RESOLVED | Noted: 2025-02-17 | Resolved: 2025-02-17

## 2025-02-17 PROBLEM — F10.21 ALCOHOL DEPENDENCE IN REMISSION (H): Status: ACTIVE | Noted: 2025-02-17

## 2025-02-17 PROCEDURE — 98006 SYNCH AUDIO-VIDEO EST MOD 30: CPT | Performed by: STUDENT IN AN ORGANIZED HEALTH CARE EDUCATION/TRAINING PROGRAM

## 2025-02-17 ASSESSMENT — PATIENT HEALTH QUESTIONNAIRE - PHQ9
10. IF YOU CHECKED OFF ANY PROBLEMS, HOW DIFFICULT HAVE THESE PROBLEMS MADE IT FOR YOU TO DO YOUR WORK, TAKE CARE OF THINGS AT HOME, OR GET ALONG WITH OTHER PEOPLE: NOT DIFFICULT AT ALL
SUM OF ALL RESPONSES TO PHQ QUESTIONS 1-9: 6
SUM OF ALL RESPONSES TO PHQ QUESTIONS 1-9: 6

## 2025-02-17 NOTE — PROGRESS NOTES
"Charlene is a 60 year old who is being evaluated via a billable video visit.    How would you like to obtain your AVS? [employer]  If the video visit is dropped, the invitation should be resent by: Text to cell phone: [phone]  Will anyone else be joining your video visit? No      Assessment & Plan     Acute non-recurrent maxillary sinusitis  unstable  - amoxicillin-clavulanate (AUGMENTIN) 875-125 MG tablet; Take 1 tablet by mouth 2 times daily for 7 days.    Non-recurrent acute serous otitis media of right ear  unstable  - amoxicillin-clavulanate (AUGMENTIN) 875-125 MG tablet; Take 1 tablet by mouth 2 times daily for 7 days.    Major depressive disorder, recurrent episode, moderate (H)  Stable, continue medications.  Chronic kidney disease, stage 3a (H)  Stable, avoid nephrotoxins    Screen for colon cancer    - Colonoscopy Screening  Referral; Future    Encounter for screening mammogram for breast cancer    - MA Screen Bilateral w/Aamir; Future          BMI  Estimated body mass index is 25.08 kg/m  as calculated from the following:    Height as of 8/13/24: 1.638 m (5' 4.5\").    Weight as of 8/13/24: 67.3 kg (148 lb 6.4 oz).   Weight management plan: Discussed healthy diet and exercise guidelines            Subjective   Charlene is a 60 year old, presenting for the following health issues:  Sinus Problem (Ear pain)      2/17/2025    11:31 AM   Additional Questions   Roomed by completed through [employer]   Accompanied by None         2/17/2025    11:31 AM   Patient Reported Additional Medications   Patient reports taking the following new medications none     Sinus Problem     History of Present Illness       Reason for visit:  Sinus and ear pain  Symptom onset:  1-3 days ago  Symptoms include:  Sinus and ear pain  Symptom intensity:  Moderate  Symptom progression:  Worsening  Had these symptoms before:  No   She is taking medications regularly.       Patient has depression and CKD that are stable      Review of " Systems  Constitutional, neuro, ENT, endocrine, pulmonary, cardiac, gastrointestinal, genitourinary, musculoskeletal, integument and psychiatric systems are negative, except as otherwise noted.      Objective           Vitals:  No vitals were obtained today due to virtual visit.    Physical Exam   GENERAL: alert and no distress  EYES: Eyes grossly normal to inspection.  No discharge or erythema, or obvious scleral/conjunctival abnormalities.  RESP: No audible wheeze, cough, or visible cyanosis.    SKIN: Visible skin clear. No significant rash, abnormal pigmentation or lesions.  PSYCH: Appropriate affect, tone, and pace of words          Video-Visit Details    Type of service:  Video Visit   Originating Location (pt. Location): Home    Distant Location (provider location):  On-site  Platform used for Video Visit: Maira  Signed Electronically by: Luisa Hall MD

## 2025-02-17 NOTE — PATIENT INSTRUCTIONS

## 2025-02-18 ENCOUNTER — PATIENT OUTREACH (OUTPATIENT)
Dept: CARE COORDINATION | Facility: CLINIC | Age: 60
End: 2025-02-18
Payer: COMMERCIAL

## 2025-02-19 ENCOUNTER — MYC MEDICAL ADVICE (OUTPATIENT)
Dept: FAMILY MEDICINE | Facility: CLINIC | Age: 60
End: 2025-02-19
Payer: COMMERCIAL

## 2025-02-26 NOTE — TELEPHONE ENCOUNTER
Patient Quality Outreach    Patient is due for the following:   Physical Preventive Adult Physical    Action(s) Taken:   Patient was scheduled for above 03/05/2025    Type of outreach:    Sent [employer] message.    Questions for provider review:    None           Evelyn Ocasio, CMA

## 2025-03-03 DIAGNOSIS — K27.9 PEPTIC ULCER: ICD-10-CM

## 2025-03-03 RX ORDER — OMEPRAZOLE 20 MG/1
CAPSULE, DELAYED RELEASE ORAL
Qty: 180 CAPSULE | Refills: 0 | Status: SHIPPED | OUTPATIENT
Start: 2025-03-03

## 2025-03-17 DIAGNOSIS — F90.0 ATTENTION DEFICIT HYPERACTIVITY DISORDER (ADHD), PREDOMINANTLY INATTENTIVE TYPE: ICD-10-CM

## 2025-03-17 RX ORDER — ATOMOXETINE 80 MG/1
80 CAPSULE ORAL DAILY
Qty: 30 CAPSULE | Refills: 0 | OUTPATIENT
Start: 2025-03-17

## 2025-03-17 NOTE — TELEPHONE ENCOUNTER
DIAGNOSIS: reinjured left knee walking in ski boots    APPOINTMENT DATE: 3/18/25   NOTES STATUS DETAILS   OFFICE NOTE from referring provider N/A Self Referral    MEDICATION LIST Internal

## 2025-03-18 ENCOUNTER — PRE VISIT (OUTPATIENT)
Dept: ORTHOPEDICS | Facility: CLINIC | Age: 60
End: 2025-03-18

## 2025-04-08 ENCOUNTER — TELEPHONE (OUTPATIENT)
Dept: FAMILY MEDICINE | Facility: CLINIC | Age: 60
End: 2025-04-08
Payer: COMMERCIAL

## 2025-04-08 NOTE — TELEPHONE ENCOUNTER
Patient Quality Outreach    Patient is due for the following:   Breast Cancer Screening - Mammogram    Action(s) Taken:   No follow up needed at this time.    Type of outreach:    Chart review performed, no outreach needed.    Questions for provider review:    None         Evelyn Ocasio, [facility]  Chart routed to None.

## 2025-04-11 DIAGNOSIS — F90.0 ATTENTION DEFICIT HYPERACTIVITY DISORDER (ADHD), PREDOMINANTLY INATTENTIVE TYPE: ICD-10-CM

## 2025-04-12 DIAGNOSIS — F51.01 PRIMARY INSOMNIA: ICD-10-CM

## 2025-04-12 DIAGNOSIS — E78.5 HYPERLIPIDEMIA LDL GOAL <130: ICD-10-CM

## 2025-04-12 DIAGNOSIS — I10 HTN, GOAL BELOW 130/80: ICD-10-CM

## 2025-04-12 DIAGNOSIS — F33.1 MAJOR DEPRESSIVE DISORDER, RECURRENT EPISODE, MODERATE (H): ICD-10-CM

## 2025-04-13 ENCOUNTER — HEALTH MAINTENANCE LETTER (OUTPATIENT)
Age: 60
End: 2025-04-13

## 2025-04-13 RX ORDER — ATOMOXETINE 80 MG/1
80 CAPSULE ORAL DAILY
Qty: 30 CAPSULE | Refills: 0 | Status: SHIPPED | OUTPATIENT
Start: 2025-04-13

## 2025-04-13 RX ORDER — LISINOPRIL 40 MG/1
40 TABLET ORAL DAILY
Qty: 90 TABLET | Refills: 0 | Status: SHIPPED | OUTPATIENT
Start: 2025-04-13

## 2025-04-13 RX ORDER — ATORVASTATIN CALCIUM 40 MG/1
40 TABLET, FILM COATED ORAL
Qty: 90 TABLET | Refills: 0 | Status: SHIPPED | OUTPATIENT
Start: 2025-04-13

## 2025-04-13 RX ORDER — TRAZODONE HYDROCHLORIDE 100 MG/1
200 TABLET ORAL AT BEDTIME
Qty: 180 TABLET | Refills: 0 | Status: SHIPPED | OUTPATIENT
Start: 2025-04-13

## 2025-05-20 ENCOUNTER — OFFICE VISIT (OUTPATIENT)
Dept: FAMILY MEDICINE | Facility: CLINIC | Age: 60
End: 2025-05-20
Payer: COMMERCIAL

## 2025-05-20 VITALS
SYSTOLIC BLOOD PRESSURE: 146 MMHG | HEIGHT: 65 IN | WEIGHT: 158 LBS | OXYGEN SATURATION: 98 % | DIASTOLIC BLOOD PRESSURE: 98 MMHG | TEMPERATURE: 97.5 F | BODY MASS INDEX: 26.33 KG/M2 | RESPIRATION RATE: 16 BRPM | HEART RATE: 85 BPM

## 2025-05-20 DIAGNOSIS — E78.5 HYPERLIPIDEMIA LDL GOAL <130: ICD-10-CM

## 2025-05-20 DIAGNOSIS — Z12.31 ENCOUNTER FOR SCREENING MAMMOGRAM FOR BREAST CANCER: ICD-10-CM

## 2025-05-20 DIAGNOSIS — I10 HTN, GOAL BELOW 130/80: ICD-10-CM

## 2025-05-20 DIAGNOSIS — Z78.9 NON-SMOKER: ICD-10-CM

## 2025-05-20 DIAGNOSIS — K27.9 PEPTIC ULCER: ICD-10-CM

## 2025-05-20 DIAGNOSIS — F90.0 ATTENTION DEFICIT HYPERACTIVITY DISORDER (ADHD), PREDOMINANTLY INATTENTIVE TYPE: ICD-10-CM

## 2025-05-20 DIAGNOSIS — F41.1 GENERALIZED ANXIETY DISORDER: ICD-10-CM

## 2025-05-20 DIAGNOSIS — I12.9 BENIGN HYPERTENSION WITH CKD (CHRONIC KIDNEY DISEASE), STAGE II: ICD-10-CM

## 2025-05-20 DIAGNOSIS — N18.2 CKD (CHRONIC KIDNEY DISEASE) STAGE 2, GFR 60-89 ML/MIN: ICD-10-CM

## 2025-05-20 DIAGNOSIS — Z13.6 SCREENING FOR CARDIOVASCULAR CONDITION: ICD-10-CM

## 2025-05-20 DIAGNOSIS — F51.01 PRIMARY INSOMNIA: ICD-10-CM

## 2025-05-20 DIAGNOSIS — Z00.00 ROUTINE GENERAL MEDICAL EXAMINATION AT A HEALTH CARE FACILITY: Primary | ICD-10-CM

## 2025-05-20 DIAGNOSIS — F33.1 MAJOR DEPRESSIVE DISORDER, RECURRENT EPISODE, MODERATE (H): ICD-10-CM

## 2025-05-20 DIAGNOSIS — N18.2 BENIGN HYPERTENSION WITH CKD (CHRONIC KIDNEY DISEASE), STAGE II: ICD-10-CM

## 2025-05-20 PROCEDURE — G2211 COMPLEX E/M VISIT ADD ON: HCPCS | Performed by: STUDENT IN AN ORGANIZED HEALTH CARE EDUCATION/TRAINING PROGRAM

## 2025-05-20 PROCEDURE — 99214 OFFICE O/P EST MOD 30 MIN: CPT | Mod: 25 | Performed by: STUDENT IN AN ORGANIZED HEALTH CARE EDUCATION/TRAINING PROGRAM

## 2025-05-20 PROCEDURE — 3080F DIAST BP >= 90 MM HG: CPT | Performed by: STUDENT IN AN ORGANIZED HEALTH CARE EDUCATION/TRAINING PROGRAM

## 2025-05-20 PROCEDURE — 90677 PCV20 VACCINE IM: CPT | Performed by: STUDENT IN AN ORGANIZED HEALTH CARE EDUCATION/TRAINING PROGRAM

## 2025-05-20 PROCEDURE — 99396 PREV VISIT EST AGE 40-64: CPT | Mod: 25 | Performed by: STUDENT IN AN ORGANIZED HEALTH CARE EDUCATION/TRAINING PROGRAM

## 2025-05-20 PROCEDURE — 3077F SYST BP >= 140 MM HG: CPT | Performed by: STUDENT IN AN ORGANIZED HEALTH CARE EDUCATION/TRAINING PROGRAM

## 2025-05-20 PROCEDURE — 90471 IMMUNIZATION ADMIN: CPT | Performed by: STUDENT IN AN ORGANIZED HEALTH CARE EDUCATION/TRAINING PROGRAM

## 2025-05-20 RX ORDER — TRAZODONE HYDROCHLORIDE 100 MG/1
200 TABLET ORAL AT BEDTIME
Qty: 180 TABLET | Refills: 0 | Status: SHIPPED | OUTPATIENT
Start: 2025-05-20

## 2025-05-20 RX ORDER — ATORVASTATIN CALCIUM 40 MG/1
40 TABLET, FILM COATED ORAL
Qty: 90 TABLET | Refills: 3 | Status: SHIPPED | OUTPATIENT
Start: 2025-05-20

## 2025-05-20 RX ORDER — ATOMOXETINE 80 MG/1
80 CAPSULE ORAL DAILY
Qty: 30 CAPSULE | Refills: 0 | Status: SHIPPED | OUTPATIENT
Start: 2025-05-20

## 2025-05-20 RX ORDER — LORAZEPAM 0.5 MG/1
TABLET ORAL
Qty: 10 TABLET | Refills: 0 | Status: SHIPPED | OUTPATIENT
Start: 2025-05-20

## 2025-05-20 RX ORDER — OMEPRAZOLE 20 MG/1
CAPSULE, DELAYED RELEASE ORAL
Qty: 180 CAPSULE | Refills: 3 | Status: SHIPPED | OUTPATIENT
Start: 2025-05-20

## 2025-05-20 RX ORDER — LISINOPRIL 40 MG/1
40 TABLET ORAL DAILY
Qty: 90 TABLET | Refills: 3 | Status: SHIPPED | OUTPATIENT
Start: 2025-05-20

## 2025-05-20 SDOH — HEALTH STABILITY: PHYSICAL HEALTH: ON AVERAGE, HOW MANY DAYS PER WEEK DO YOU ENGAGE IN MODERATE TO STRENUOUS EXERCISE (LIKE A BRISK WALK)?: 2 DAYS

## 2025-05-20 ASSESSMENT — PATIENT HEALTH QUESTIONNAIRE - PHQ9
SUM OF ALL RESPONSES TO PHQ QUESTIONS 1-9: 10
10. IF YOU CHECKED OFF ANY PROBLEMS, HOW DIFFICULT HAVE THESE PROBLEMS MADE IT FOR YOU TO DO YOUR WORK, TAKE CARE OF THINGS AT HOME, OR GET ALONG WITH OTHER PEOPLE: SOMEWHAT DIFFICULT
SUM OF ALL RESPONSES TO PHQ QUESTIONS 1-9: 10

## 2025-05-20 ASSESSMENT — ANXIETY QUESTIONNAIRES
GAD7 TOTAL SCORE: 6
8. IF YOU CHECKED OFF ANY PROBLEMS, HOW DIFFICULT HAVE THESE MADE IT FOR YOU TO DO YOUR WORK, TAKE CARE OF THINGS AT HOME, OR GET ALONG WITH OTHER PEOPLE?: SOMEWHAT DIFFICULT
1. FEELING NERVOUS, ANXIOUS, OR ON EDGE: SEVERAL DAYS
GAD7 TOTAL SCORE: 6
7. FEELING AFRAID AS IF SOMETHING AWFUL MIGHT HAPPEN: SEVERAL DAYS
IF YOU CHECKED OFF ANY PROBLEMS ON THIS QUESTIONNAIRE, HOW DIFFICULT HAVE THESE PROBLEMS MADE IT FOR YOU TO DO YOUR WORK, TAKE CARE OF THINGS AT HOME, OR GET ALONG WITH OTHER PEOPLE: SOMEWHAT DIFFICULT
7. FEELING AFRAID AS IF SOMETHING AWFUL MIGHT HAPPEN: SEVERAL DAYS
GAD7 TOTAL SCORE: 6
4. TROUBLE RELAXING: SEVERAL DAYS
3. WORRYING TOO MUCH ABOUT DIFFERENT THINGS: SEVERAL DAYS
6. BECOMING EASILY ANNOYED OR IRRITABLE: SEVERAL DAYS
5. BEING SO RESTLESS THAT IT IS HARD TO SIT STILL: NOT AT ALL
2. NOT BEING ABLE TO STOP OR CONTROL WORRYING: SEVERAL DAYS

## 2025-05-20 ASSESSMENT — SOCIAL DETERMINANTS OF HEALTH (SDOH): HOW OFTEN DO YOU GET TOGETHER WITH FRIENDS OR RELATIVES?: ONCE A WEEK

## 2025-05-20 NOTE — PROGRESS NOTES
Preventive Care Visit  [facility] TERESSA Hall MD, Family Medicine  May 20, 2025      Assessment & Plan     Routine general medical examination at a health care facility    - Adult Dermatology  Referral; Future    Hyperlipidemia LDL goal <130  stable  - Lipid panel reflex to direct LDL Non-fasting; Future  - atorvastatin (LIPITOR) 40 MG tablet; Take 1 tablet (40 mg) by mouth daily at 2 pm.    Benign hypertension with CKD (chronic kidney disease) stage II (H)/ckd stage 2  Stable ,continue medication  - Albumin Random Urine Quantitative with Creat Ratio; Future  - Hemoglobin A1c; Future  Avoid nephrotoxins.  Primary insomnia  Stable ,continue medication  - traZODone (DESYREL) 100 MG tablet; Take 2 tablets (200 mg) by mouth at bedtime.    Major depressive disorder, recurrent episode, moderate (H)  Improving but not at goal.,continue medications. They will summer break soon and hoping this will help with mood. She will follow up if there is no improvement.  - traZODone (DESYREL) 100 MG tablet; Take 2 tablets (200 mg) by mouth at bedtime.  -continue lexapro and CBT  Peptic ulcer  Stable.  - omeprazole (PRILOSEC) 20 MG DR capsule; TAKE ONE TO TWO CAPSULES BY MOUTH EVERY DAY    HTN, goal below 130/80  Stable ,continue medication  - lisinopril (ZESTRIL) 40 MG tablet; Take 1 tablet (40 mg) by mouth daily.  - Comprehensive metabolic panel (BMP + Alb, Alk Phos, ALT, AST, Total. Bili, TP); Future  - CBC with platelets; Future    Attention deficit hyperactivity disorder (ADHD), predominantly inattentive type  Stable ,continue medication  - atomoxetine (STRATT improving ERA) 80 MG capsule; Take 1 capsule (80 mg) by mouth daily.        Encounter for screening mammogram for breast cancer  I encourage patient to schedule mammogram.  Order has been placed.    Generalized anxiety disorder  Stable ,continue medication  - LORazepam (ATIVAN) 0.5 MG tablet; TAKE ONE TABLET BY MOUTH ONCE DAILY AS  NEEDED FOR ANXIETY (USE SPARINGLY)    Non-smoker      Recommend colon cancer screening.  Referral has been placed.  Encourage patient to schedule an appointment.      Counseling  Appropriate preventive services were addressed with this patient via screening, questionnaire, or discussion as appropriate for fall prevention, nutrition, physical activity, Tobacco-use cessation, social engagement, weight loss and cognition.  Checklist reviewing preventive services available has been given to the patient.  Reviewed patient's diet, addressing concerns and/or questions.   She is at risk for lack of exercise and has been provided with information to increase physical activity for the benefit of her well-being.   The patient was instructed to see the dentist every 6 months.   The patient's PHQ-9 score is consistent with moderate depression. She was provided with information regarding depression.         The longitudinal plan of care for the diagnosis(es)/condition(s) as documented were addressed during this visit. Due to the added complexity in care, I will continue to support Charlene in the subsequent management and with ongoing continuity of care.  Subjective   Charlene is a 60 year old, presenting for the following:  Physical        5/20/2025     3:41 PM   Additional Questions   Roomed by Jen   Accompanied by Self         5/20/2025     3:41 PM   Patient Reported Additional Medications   Patient reports taking the following new medications NA          HPI       Patient with history of CKD stage II hypertension, Anxiety, Hyperlipidemia and ADHD arrived for Annual Physical, not due for PAP. GAD7 and PHQ9 completed online.    Patient is not fasting for lab work.     Adult ADD/ADHD Medication Follow up    Since you last visit, how has your ADD/ADHD been? Stable  Any concerns regarding your current medication dosage? No  Are you having any new side effects from the medication? No  Any questions regarding your ADHD/ADD or medications?  No     Hyperlipidemia Follow-Up    Are you regularly taking any medication or supplement to lower your cholesterol?   Yes- Atorvastatin   Are you having muscle aches or other side effects that you think could be caused by your cholesterol lowering medication?  No    Hypertension Follow-up    Do you check your blood pressure regularly outside of the clinic? No   Are you following a low salt diet? No  Are your blood pressures ever more than 140 on the top number (systolic) OR more   than 90 on the bottom number (diastolic), for example 140/90? N/A    BP Readings from Last 2 Encounters:   05/20/25 (!) 146/98   08/13/24 132/89     Depression and Anxiety   How are you doing with your depression since your last visit? Worsened due to recent work stress  How are you doing with your anxiety since your last visit?  Worsened  due to recent work stress  Are you having other symptoms that might be associated with depression or anxiety? No  Have you had a significant life event? OTHER: Work stress   Do you have any concerns with your use of alcohol or other drugs? No  Furthermore, she manages hyperlipidemia with a statin, hypertension with lisinopril, GERD with omeprazole, and insomnia with trazodone. She reports no adverse effects from her medications and adheres to her regimen diligently.   Social History     Tobacco Use    Smoking status: Never    Smokeless tobacco: Never   Vaping Use    Vaping status: Never Used   Substance Use Topics    Alcohol use: Yes     Comment: 3-4 drinks a week at most- wine     Drug use: No         8/13/2024     1:58 PM 2/17/2025    11:21 AM 5/20/2025     3:26 PM   PHQ   PHQ-9 Total Score 4 6  10    Q9: Thoughts of better off dead/self-harm past 2 weeks Not at all Not at all Not at all       Patient-reported         6/11/2024     8:45 AM 6/26/2024    10:41 AM 5/20/2025     3:26 PM   JOANA-7 SCORE   Total Score 14 (moderate anxiety) 13 (moderate anxiety) 6 (mild anxiety)   Total Score 14 13 6         Patient-reported         5/20/2025     3:26 PM   Last PHQ-9   1.  Little interest or pleasure in doing things 2   2.  Feeling down, depressed, or hopeless 1   3.  Trouble falling or staying asleep, or sleeping too much 2   4.  Feeling tired or having little energy 2   5.  Poor appetite or overeating 1   6.  Feeling bad about yourself 1   7.  Trouble concentrating 1   8.  Moving slowly or restless 0   Q9: Thoughts of better off dead/self-harm past 2 weeks 0   PHQ-9 Total Score 10        Patient-reported         5/20/2025     3:26 PM   JOANA-7    1. Feeling nervous, anxious, or on edge 1   2. Not being able to stop or control worrying 1   3. Worrying too much about different things 1   4. Trouble relaxing 1   5. Being so restless that it is hard to sit still 0   6. Becoming easily annoyed or irritable 1   7. Feeling afraid, as if something awful might happen 1   JOANA-7 Total Score 6    If you checked any problems, how difficult have they made it for you to do your work, take care of things at home, or get along with other people? Somewhat difficult       Patient-reported                 5/20/2025   General Health   How would you rate your overall physical health? (!) FAIR   Feel stress (tense, anxious, or unable to sleep) Only a little   (!) STRESS CONCERN      5/20/2025   Nutrition   Three or more servings of calcium each day? Yes   Diet: Regular (no restrictions)   How many servings of fruit and vegetables per day? (!) 2-3   How many sweetened beverages each day? 0-1         5/20/2025   Exercise   Days per week of moderate/strenous exercise 2 days   (!) EXERCISE CONCERN      5/20/2025   Social Factors   Frequency of gathering with friends or relatives Once a week   Worry food won't last until get money to buy more No   Food not last or not have enough money for food? No   Do you have housing? (Housing is defined as stable permanent housing and does not include staying outside in a car, in a tent, in an abandoned  building, in an overnight shelter, or couch-surfing.) Yes   Are you worried about losing your housing? No   Lack of transportation? No   Unable to get utilities (heat,electricity)? No         5/20/2025   Fall Risk   Fallen 2 or more times in the past year? No   Trouble with walking or balance? No          5/20/2025   Dental   Dentist two times every year? (!) NO       Today's PHQ-9 Score:       5/20/2025     3:26 PM   PHQ-9 SCORE   PHQ-9 Total Score MyChart 10 (Moderate depression)   PHQ-9 Total Score 10        Patient-reported         5/20/2025   Substance Use   Alcohol more than 3/day or more than 7/wk No   Do you use any other substances recreationally? (!) DECLINE     Social History     Tobacco Use    Smoking status: Never    Smokeless tobacco: Never   Vaping Use    Vaping status: Never Used   Substance Use Topics    Alcohol use: Yes     Comment: 3-4 drinks a week at most- wine     Drug use: No           5/3/2022   LAST FHS-7 RESULTS   1st degree relative breast or ovarian cancer No    Any relative bilateral breast cancer No    Any male have breast cancer No    Any ONE woman have BOTH breast AND ovarian cancer No    Any woman with breast cancer before 50yrs No    2 or more relatives with breast AND/OR ovarian cancer No    2 or more relatives with breast AND/OR bowel cancer No        Proxy-reported       Mammogram Screening - Mammogram every 1-2 years updated in Health Maintenance based on mutual decision making        5/20/2025   STI Screening   New sexual partner(s) since last STI/HIV test? No     History of abnormal Pap smear:         8/16/2005    12:00 AM   PAP / HPV   PAP (Historical) NIL      ASCVD Risk   The 10-year ASCVD risk score (Ja FOX, et al., 2019) is: 3.3%    Values used to calculate the score:      Age: 60 years      Sex: Female      Is Non- : No      Diabetic: No      Tobacco smoker: No      Systolic Blood Pressure: 146 mmHg      Is BP treated: Yes      HDL  "Cholesterol: 67 mg/dL      Total Cholesterol: 160 mg/dL          Reviewed and updated as needed this visit by Provider      Past Medical History:   Diagnosis Date    Anemia, unspecified     CONVULSIONS, OTHER 1/8/2006    History of seizure with withdrawal of Butalbital    Depressive disorder, not elsewhere classified     Headache(784.0)     Other and unspecified alcohol dependence, unspecified drinking behavior     Other convulsions     Peptic ulcer, unspecified site, unspecified as acute or chronic, without mention of hemorrhage, perforation, or obstruction     Premenstrual tension syndromes     RENAL CYST 6/30/2005 June 30, 2005 - 3.7cm simple cyst.  Recheck 6 months. January 10, 2006 - Will recheck. 5/06 - stable.    Unspecified essential hypertension     Unspecified gastritis and gastroduodenitis without mention of hemorrhage     Unspecified hemorrhoids without mention of complication      Past Surgical History:   Procedure Laterality Date    HYSTERECTOMY, PAP NO LONGER INDICATED  2004    Still has ovaries and     SURGICAL HISTORY OF -   8/23/2004    Hemorrhoidectomy & Partial Lateral Sphincterotomy    SURGICAL HISTORY OF -   5/2/2003    Hemorrhoid Banding x2    ZZHC COLONOSCOPY THRU STOMA, DIAGNOSTIC  5/2/03    Normal. Repeat at age 52     Labs reviewed in EPIC      Review of Systems  Constitutional, HEENT, cardiovascular, pulmonary, GI, , musculoskeletal, neuro, skin, endocrine and psych systems are negative, except as otherwise noted.     Objective    Exam  BP (!) 146/98   Pulse 85   Temp 97.5  F (36.4  C) (Temporal)   Resp 16   Ht 1.64 m (5' 4.57\")   Wt 71.7 kg (158 lb)   SpO2 98%   BMI 26.65 kg/m     Estimated body mass index is 26.65 kg/m  as calculated from the following:    Height as of this encounter: 1.64 m (5' 4.57\").    Weight as of this encounter: 71.7 kg (158 lb).    Physical Exam  GENERAL: alert and no distress  EYES: Eyes grossly normal to inspection, PERRL and conjunctivae and " sclerae normal  HENT: ear canals and TM's normal, nose and mouth without ulcers or lesions  RESP: lungs clear to auscultation - no rales, rhonchi or wheezes  CV: regular rate and rhythm, normal S1 S2, no S3 or S4, no murmur, click or rub, no peripheral edema  ABDOMEN: soft, nontender, no hepatosplenomegaly, no masses and bowel sounds normal  MS: no gross musculoskeletal defects noted, no edema  SKIN: no suspicious lesions or rashes  NEURO: Normal strength and tone, mentation intact and speech normal  PSYCH: mentation appears normal, affect normal/bright        Signed Electronically by: Luisa Hall MD    Answers submitted by the patient for this visit:  Patient Health Questionnaire (Submitted on 5/20/2025)  If you checked off any problems, how difficult have these problems made it for you to do your work, take care of things at home, or get along with other people?: Somewhat difficult  PHQ9 TOTAL SCORE: 10  Patient Health Questionnaire (G7) (Submitted on 5/20/2025)  JOANA 7 TOTAL SCORE: 6

## 2025-05-20 NOTE — PATIENT INSTRUCTIONS
Patient Education   Preventive Care Advice      Nutrition  Eat 5 or more servings of fruits and vegetables each day.  Try wheat bread, brown rice and whole grain pasta (instead of white bread, rice, and pasta).  Get enough calcium and vitamin D. Check the label on foods and aim for 100% of the RDA (recommended daily allowance).  Lifestyle  Exercise at least 150 minutes each week  (30 minutes a day, 5 days a week).  Do muscle strengthening activities 2 days a week. These help control your weight and prevent disease.  No smoking.  Wear sunscreen to prevent skin cancer.  Have a dental exam and cleaning every 6 months.  Yearly exams  See your health care team every year to talk about:  Any changes in your health.  Any medicines your care team has prescribed.  Preventive care, family planning, and ways to prevent chronic diseases.  Shots (vaccines)   HPV shots (up to age 26), if you've never had them before.  Hepatitis B shots (up to age 59), if you've never had them before.  COVID-19 shot: Get this shot when it's due.  Flu shot: Get a flu shot every year.  Tetanus shot: Get a tetanus shot every 10 years.  Pneumococcal, hepatitis A, and RSV shots: Ask your care team if you need these based on your risk.  Shingles shot (for age 50 and up)  General health tests  Diabetes screening:  Starting at age 35, Get screened for diabetes at least every 3 years.  If you are younger than age 35, ask your care team if you should be screened for diabetes.  Cholesterol test: At age 39, start having a cholesterol test every 5 years, or more often if advised.  Bone density scan (DEXA): At age 50, ask your care team if you should have this scan for osteoporosis (brittle bones).  Hepatitis C: Get tested at least once in your life.  STIs (sexually transmitted infections)  Before age 24: Ask your care team if you should be screened for STIs.  After age 24: Get screened for STIs if you're at risk. You are at risk for STIs (including HIV)  if:  You are sexually active with more than one person.  You don't use condoms every time.  You or a partner was diagnosed with a sexually transmitted infection.  If you are at risk for HIV, ask about PrEP medicine to prevent HIV.  Get tested for HIV at least once in your life, whether you are at risk for HIV or not.  Cancer screening tests  Cervical cancer screening: If you have a cervix, begin getting regular cervical cancer screening tests starting at age 21.  Breast cancer scan (mammogram): If you've ever had breasts, begin having regular mammograms starting at age 40. This is a scan to check for breast cancer.  Colon cancer screening: It is important to start screening for colon cancer at age 45.  Have a colonoscopy test every 10 years (or more often if you're at risk) Or, ask your provider about stool tests like a FIT test every year or Cologuard test every 3 years.  To learn more about your testing options, visit:   .  For help making a decision, visit:   https://bit.ly/ij76457.  Prostate cancer screening test: If you have a prostate, ask your care team if a prostate cancer screening test (PSA) at age 55 is right for you.  Lung cancer screening: If you are a current or former smoker ages 50 to 80, ask your care team if ongoing lung cancer screenings are right for you.  For informational purposes only. Not to replace the advice of your health care provider. Copyright   2023 [facility]. All rights reserved. Clinically reviewed by the [facility] Program. [employer] 203698 - REV 01/24.  Learning About Depression Screening  What is depression screening?  Depression screening is a way to see if you have depression symptoms. It may be done by a doctor or counselor. It's often part of a routine checkup. That's because your mental health is just as important as your physical health.  Depression is a mental health condition that affects how you feel, think, and act. You may:  Have less  "energy.  Lose interest in your daily activities.  Feel sad and grouchy for a long time.  Depression is very common. It affects people of all ages.  Many things can lead to depression. Some people become depressed after they have a stroke or find out they have a major illness like cancer or heart disease. The death of a loved one or a breakup may lead to depression. It can run in families. Most experts believe that a combination of inherited genes and stressful life events can cause it.  What happens during screening?  You may be asked to fill out a form about your depression symptoms. You and the doctor will discuss your answers. The doctor may ask you more questions to learn more about how you think, act, and feel.  What happens after screening?  If you have symptoms of depression, your doctor will talk to you about your options.  Doctors usually treat depression with medicines or counseling. Often, combining the two works best. Many people don't get help because they think that they'll get over the depression on their own. But people with depression may not get better unless they get treatment.  The cause of depression is not well understood. There may be many factors involved. But if you have depression, it's not your fault.  A serious symptom of depression is thinking about death or suicide. If you or someone you care about talks about this or about feeling hopeless, get help right away.  It's important to know that depression can be treated. Medicine, counseling, and self-care may help.  Where can you learn more?  Go to https://www.valuescope.net/patiented  Enter T185 in the search box to learn more about \"Learning About Depression Screening.\"  Current as of: July 31, 2024  Content Version: 14.4    4270-0784 [employer].   Care instructions adapted under license by your healthcare professional. If you have questions about a medical condition or this instruction, always ask your healthcare " professional. [employer] [facility] [employer], [facility] disclaims any warranty or liability for your use of this information.

## 2025-05-21 ENCOUNTER — PATIENT OUTREACH (OUTPATIENT)
Dept: CARE COORDINATION | Facility: CLINIC | Age: 60
End: 2025-05-21
Payer: COMMERCIAL

## 2025-06-10 ENCOUNTER — MYC MEDICAL ADVICE (OUTPATIENT)
Dept: FAMILY MEDICINE | Facility: CLINIC | Age: 60
End: 2025-06-10
Payer: COMMERCIAL

## 2025-06-14 ENCOUNTER — HEALTH MAINTENANCE LETTER (OUTPATIENT)
Age: 60
End: 2025-06-14

## 2025-06-15 DIAGNOSIS — F90.0 ATTENTION DEFICIT HYPERACTIVITY DISORDER (ADHD), PREDOMINANTLY INATTENTIVE TYPE: ICD-10-CM

## 2025-06-16 RX ORDER — ATOMOXETINE 80 MG/1
80 CAPSULE ORAL DAILY
Qty: 30 CAPSULE | Refills: 5 | Status: SHIPPED | OUTPATIENT
Start: 2025-06-16

## 2025-07-01 DIAGNOSIS — F41.1 GENERALIZED ANXIETY DISORDER: ICD-10-CM

## 2025-07-01 DIAGNOSIS — F33.1 MAJOR DEPRESSIVE DISORDER, RECURRENT EPISODE, MODERATE (H): ICD-10-CM

## 2025-07-01 RX ORDER — ESCITALOPRAM OXALATE 20 MG/1
20 TABLET ORAL DAILY
Qty: 90 TABLET | Refills: 2 | Status: SHIPPED | OUTPATIENT
Start: 2025-07-01

## 2025-07-07 NOTE — TELEPHONE ENCOUNTER
Patient Quality Outreach    Patient is due for the following:   Colon Cancer Screening  Breast Cancer Screening - Mammogram    Action(s) Taken:   Schedule a office visit for above    Type of outreach:    Sent [employer] message.    Questions for provider review:    None         Evelyn Ocasio, [facility]  Chart routed to None.

## (undated) RX ORDER — TRIAMCINOLONE ACETONIDE 40 MG/ML
INJECTION, SUSPENSION INTRA-ARTICULAR; INTRAMUSCULAR
Status: DISPENSED
Start: 2018-05-03

## (undated) RX ORDER — ROPIVACAINE HYDROCHLORIDE 2 MG/ML
INJECTION, SOLUTION EPIDURAL; INFILTRATION; PERINEURAL
Status: DISPENSED
Start: 2018-05-03